# Patient Record
Sex: FEMALE | Race: WHITE | Employment: UNEMPLOYED | URBAN - METROPOLITAN AREA
[De-identification: names, ages, dates, MRNs, and addresses within clinical notes are randomized per-mention and may not be internally consistent; named-entity substitution may affect disease eponyms.]

---

## 2017-01-25 RX ORDER — LOSARTAN POTASSIUM AND HYDROCHLOROTHIAZIDE 25; 100 MG/1; MG/1
TABLET ORAL
Qty: 90 TAB | Refills: 1 | Status: SHIPPED | OUTPATIENT
Start: 2017-01-25 | End: 2017-08-03 | Stop reason: SDUPTHER

## 2017-01-25 RX ORDER — FLUOXETINE HYDROCHLORIDE 40 MG/1
CAPSULE ORAL
Qty: 90 CAP | Refills: 1 | Status: SHIPPED | OUTPATIENT
Start: 2017-01-25 | End: 2017-09-08 | Stop reason: SDUPTHER

## 2017-01-25 RX ORDER — AMLODIPINE BESYLATE 5 MG/1
TABLET ORAL
Qty: 90 TAB | Refills: 1 | Status: SHIPPED | OUTPATIENT
Start: 2017-01-25 | End: 2017-11-17 | Stop reason: SDUPTHER

## 2017-02-15 LAB — HBA1C MFR BLD HPLC: 7 %

## 2017-03-30 ENCOUNTER — OFFICE VISIT (OUTPATIENT)
Dept: INTERNAL MEDICINE CLINIC | Age: 57
End: 2017-03-30

## 2017-03-30 VITALS
WEIGHT: 221.6 LBS | HEIGHT: 62 IN | DIASTOLIC BLOOD PRESSURE: 78 MMHG | OXYGEN SATURATION: 98 % | TEMPERATURE: 98.2 F | SYSTOLIC BLOOD PRESSURE: 128 MMHG | HEART RATE: 86 BPM | RESPIRATION RATE: 16 BRPM | BODY MASS INDEX: 40.78 KG/M2

## 2017-03-30 DIAGNOSIS — E11.9 TYPE 2 DIABETES MELLITUS WITHOUT COMPLICATION, WITH LONG-TERM CURRENT USE OF INSULIN (HCC): Primary | ICD-10-CM

## 2017-03-30 DIAGNOSIS — E78.5 HYPERLIPIDEMIA LDL GOAL <100: ICD-10-CM

## 2017-03-30 DIAGNOSIS — Z13.9 SCREENING: ICD-10-CM

## 2017-03-30 DIAGNOSIS — F32.5 MAJOR DEPRESSIVE DISORDER WITH SINGLE EPISODE, IN FULL REMISSION (HCC): ICD-10-CM

## 2017-03-30 DIAGNOSIS — I10 ESSENTIAL HYPERTENSION: ICD-10-CM

## 2017-03-30 DIAGNOSIS — Z79.4 TYPE 2 DIABETES MELLITUS WITHOUT COMPLICATION, WITH LONG-TERM CURRENT USE OF INSULIN (HCC): Primary | ICD-10-CM

## 2017-03-30 NOTE — PROGRESS NOTES
HPI:  Comfort Males is a 60y.o. year old female who returns to clinic today for follow up:   AODM, HTN, hypercholesterolemia and depression     1. AODM:  Since last visit: she has lost 13 pounds since last visit on trulicity and is followed by Dr Clayton Or in endocrine .  Home glucose monitorin-110 with occasional 160 range. No low BS.  She reports medication compliance: taking medications.  She reports the following medication side effects: none.  following diabetic diet sometimes. 2. CV: Not exercising. She reports taking medications as instructed, no medication side effects noted,  BP readings outside of office have been in the 120's / 70's range. .  Diet and Lifestyle: generally follows a low fat low cholesterol diet, generally follows a low sodium diet. 3. Depression:  Treatment includes Prozac and no other therapies. Ongoing symptoms include none. She denies depressed mood, anhedonia and No suicidal ideation. .   She experiences the following side effects from the treatment: none.      Current Outpatient Prescriptions   Medication Sig Dispense Refill    CETIRIZINE HCL (ZYRTEC PO) Take  by mouth.  atorvastatin (LIPITOR) 40 mg tablet TAKE 1 TABLET NIGHTLY 90 Tab 0    amLODIPine (NORVASC) 5 mg tablet TAKE 1 TABLET DAILY 90 Tab 1    losartan-hydroCHLOROthiazide (HYZAAR) 100-25 mg per tablet TAKE 1 TABLET DAILY FOR HYPERTENSION 90 Tab 1    FLUoxetine (PROZAC) 40 mg capsule TAKE 1 CAPSULE DAILY 90 Cap 1    dulaglutide (TRULICITY) 0.19 PY/5.1 mL sub-q pen 0.75 mg by SubCUTAneous route every seven (7) days.  triamcinolone (NASACORT AQ) 55 mcg nasal inhaler 2 Sprays daily.  insulin CONCENTRATED regular (U-500 CONCENTRATED) 500 unit/mL soln by SubCUTAneous route. 18 units 3 times a day with meals       metFORMIN ER (GLUCOPHAGE XR) 500 mg tablet Take 2 tablets by mouth two (2) times a day.  120 tablet 0    Insulin Syringe-Needle U-100 1 mL 30 x 5/16\" syrg 1 Syringe by SubCUTAneous route daily as needed.  Insulin Syringe-Needle U-100 (BD INSULIN SYRINGE ULTRA-FINE) 1 mL 30 x 1/2\" syrg Use as directed. DX:250 100 Syringe 11    loratadine (CLARITIN) 10 mg tablet Take 10 mg by mouth daily.  fiber cap Take  by mouth.  ibuprofen (ADVIL) 200 mg tablet Take 3 Tabs by mouth every six (6) hours as needed for Pain. 60 Tab 0    aspirin 81 mg Tab Take  by mouth.  FLORIN PEN NEEDLE 32 x 5/32 \" ndle TEST BLOOD SUGAR IF GREATER THAN 180, CALCULATE CARB INTAKE FOR MEAL, AND GIVE 15-20 UNITS AT MEALS. 100 Each 3     Allergies   Allergen Reactions    Tenormin [Atenolol] Other (comments)     depression    Vicodin [Hydrocodone-Acetaminophen] Nausea and Vomiting     Social History   Substance Use Topics    Smoking status: Former Smoker     Packs/day: 2.00     Years: 20.00     Quit date: 6/1/1992    Smokeless tobacco: Never Used    Alcohol use No      Comment: rarely         Review of Systems   Constitutional: Negative for malaise/fatigue. Respiratory: Negative for shortness of breath. Cardiovascular: Negative for chest pain and leg swelling. Gastrointestinal: Negative for abdominal pain and heartburn. Neurological: Negative for dizziness and headaches. Physical Exam   Constitutional: She appears well-developed. No distress. /78  Pulse 86  Temp 98.2 °F (36.8 °C)  Resp 16  Ht 5' 2.01\" (1.575 m)  Wt 221 lb 9.6 oz (100.5 kg)  LMP 06/01/1998  SpO2 98%  BMI 40.52 kg/m2   Neck: Carotid bruit is not present. Cardiovascular: Normal rate, regular rhythm, normal heart sounds and intact distal pulses. No murmur heard. Pulmonary/Chest: Effort normal and breath sounds normal. She has no wheezes. Abdominal: Soft. Bowel sounds are normal. She exhibits no distension and no mass. There is no tenderness. Musculoskeletal: She exhibits no edema. Psychiatric: She has a normal mood and affect. Vitals reviewed. foot exam: Monofilament intact bilaterally. Pulses 2+ bilaterally.  No skin lesions or open wounds. Assessment & Plan:    ICD-10-CM ICD-9-CM    1. Type 2 diabetes mellitus without complication, with long-term current use of insulin ([address]  At goal last a1c at endocrine was 7.0. Continue current medication. E11.9 250.00 MICROALBUMIN, UR, RAND W/ MICROALBUMIN/CREA RATIO    Z79.4 V58.67    2. Essential hypertension  Well controlled, continue current medication. I10 401.9    3. Major depressive disorder with single episode, in full remission ([address]  Well controlled, continue current medication. F32.5 296.26    4. Hyperlipidemia LDL goal <100  Continue current plan and check lab work. W31.2 248.9 METABOLIC PANEL, COMPREHENSIVE      LIPID PANEL   5. Screening Z13.9 V82.9 CBC WITH AUTOMATED DIFF      Getting ready to travel to [facility] and needs to see travel clinic. Follow-up Disposition:  Return in about 6 months (around 9/30/2017) for follow up. Advised her to call back or return to office if symptoms worsen/change/persist.  Discussed expected course/resolution/complications of diagnosis in detail with patient. Medication risks/benefits/costs/interactions/alternatives discussed with patient. She was given an after visit summary which includes diagnoses, current medications, & vitals. She expressed understanding with the diagnosis and plan.

## 2017-03-31 LAB
ALBUMIN SERPL-MCNC: 4.2 G/DL (ref 3.5–5.5)
ALBUMIN/CREAT UR: <12 MG/G CREAT (ref 0–30)
ALBUMIN/GLOB SERPL: 2 {RATIO} (ref 1.2–2.2)
ALP SERPL-CCNC: 124 IU/L (ref 39–117)
ALT SERPL-CCNC: 26 IU/L (ref 0–32)
AST SERPL-CCNC: 22 IU/L (ref 0–40)
BASOPHILS # BLD AUTO: 0.1 X10E3/UL (ref 0–0.2)
BASOPHILS NFR BLD AUTO: 1 %
BILIRUB SERPL-MCNC: 0.4 MG/DL (ref 0–1.2)
BUN SERPL-MCNC: 9 MG/DL (ref 6–24)
BUN/CREAT SERPL: 15 (ref 9–23)
CALCIUM SERPL-MCNC: 9.7 MG/DL (ref 8.7–10.2)
CHLORIDE SERPL-SCNC: 95 MMOL/L (ref 96–106)
CHOLEST SERPL-MCNC: 149 MG/DL (ref 100–199)
CO2 SERPL-SCNC: 20 MMOL/L (ref 18–29)
CREAT SERPL-MCNC: 0.59 MG/DL (ref 0.57–1)
CREAT UR-MCNC: 25.1 MG/DL
EOSINOPHIL # BLD AUTO: 0.1 X10E3/UL (ref 0–0.4)
EOSINOPHIL NFR BLD AUTO: 2 %
ERYTHROCYTE [DISTWIDTH] IN BLOOD BY AUTOMATED COUNT: 14.6 % (ref 12.3–15.4)
GLOBULIN SER CALC-MCNC: 2.1 G/DL (ref 1.5–4.5)
GLUCOSE SERPL-MCNC: 197 MG/DL (ref 65–99)
HCT VFR BLD AUTO: 39.1 % (ref 34–46.6)
HDLC SERPL-MCNC: 47 MG/DL
HGB BLD-MCNC: 13.2 G/DL (ref 11.1–15.9)
IMM GRANULOCYTES # BLD: 0 X10E3/UL (ref 0–0.1)
IMM GRANULOCYTES NFR BLD: 0 %
LDLC SERPL CALC-MCNC: 80 MG/DL (ref 0–99)
LYMPHOCYTES # BLD AUTO: 2.5 X10E3/UL (ref 0.7–3.1)
LYMPHOCYTES NFR BLD AUTO: 35 %
MCH RBC QN AUTO: 28.4 PG (ref 26.6–33)
MCHC RBC AUTO-ENTMCNC: 33.8 G/DL (ref 31.5–35.7)
MCV RBC AUTO: 84 FL (ref 79–97)
MICROALBUMIN UR-MCNC: <3 UG/ML
MONOCYTES # BLD AUTO: 0.3 X10E3/UL (ref 0.1–0.9)
MONOCYTES NFR BLD AUTO: 5 %
NEUTROPHILS # BLD AUTO: 4 X10E3/UL (ref 1.4–7)
NEUTROPHILS NFR BLD AUTO: 57 %
PLATELET # BLD AUTO: 375 X10E3/UL (ref 150–379)
POTASSIUM SERPL-SCNC: 4.7 MMOL/L (ref 3.5–5.2)
PROT SERPL-MCNC: 6.3 G/DL (ref 6–8.5)
RBC # BLD AUTO: 4.64 X10E6/UL (ref 3.77–5.28)
SODIUM SERPL-SCNC: 139 MMOL/L (ref 134–144)
TRIGL SERPL-MCNC: 112 MG/DL (ref 0–149)
VLDLC SERPL CALC-MCNC: 22 MG/DL (ref 5–40)
WBC # BLD AUTO: 7.1 X10E3/UL (ref 3.4–10.8)

## 2017-04-14 ENCOUNTER — TELEPHONE (OUTPATIENT)
Dept: INTERNAL MEDICINE CLINIC | Age: 57
End: 2017-04-14

## 2017-04-14 RX ORDER — CHLOROQUINE PHOSPHATE 500 MG/1
TABLET, FILM COATED ORAL
Qty: 30 TAB | Refills: 0 | Status: CANCELLED | OUTPATIENT
Start: 2017-04-14

## 2017-04-14 NOTE — TELEPHONE ENCOUNTER
----- Message from Romana Case, LPN sent at 7/7/0668  3:58 PM EDT -----  Regarding: FW: Prescription Question  Contact: [address] on [facility] was called and they stated for this pt and for where she is going she would need the Typhoid Vaccine, Malaria Chloroquine, Yellow Fever Vaccine. Jennifer Tubbs  ----- Message -----     From: Rosa Palma     Sent: 4/3/2017   2:38 PM       To: [facility]  Subject: RE: Prescription Question                        [place]. [facility] at [facility], [address]. Phone is [phone]. Thanks!    ----- Message -----  From: Romana Case, LPN  Sent: 9/4/56, 42:05 AM  To: Rosa Palma  Subject: RE: Prescription Question    Manus Ran,  We received your message for the scripts for travel. Can you please let me know where it is you are traveling to and which [facility] you talked to. Thank you,    Jennifer Tubbs LPN    ----- Message -----     From: Rosa Palma     Sent: 3/30/2017  3:27 PM EDT       To: Chikis Waldron MD  Subject: Prescription Question    [facility] needs a scrip for the Typhoid (Vivotif), Malaria (Chloroquine) and Yellow Fever vaccine (for travel in Sept). Please fax that to them? Their fax is [phone].

## 2017-04-19 ENCOUNTER — TELEPHONE (OUTPATIENT)
Dept: INTERNAL MEDICINE CLINIC | Age: 57
End: 2017-04-19

## 2017-04-19 DIAGNOSIS — Z01.00 EYE EXAM, ROUTINE: Primary | ICD-10-CM

## 2017-04-19 NOTE — TELEPHONE ENCOUNTER
----- Message from Felix Stevenson sent at 4/19/2017 10:31 AM EDT -----  Regarding: Referral Request  Contact: [phone]  I need a referral to Dr. Killian Sidhu for June 2. He is with [facility] on [address]. This is for my annual eye exam.  Thanks much!

## 2017-04-20 ENCOUNTER — TELEPHONE (OUTPATIENT)
Dept: INTERNAL MEDICINE CLINIC | Age: 57
End: 2017-04-20

## 2017-04-20 RX ORDER — CHLOROQUINE PHOSPHATE 500 MG/1
TABLET, FILM COATED ORAL
Qty: 7 TAB | Refills: 0 | Status: SHIPPED | OUTPATIENT
Start: 2017-04-20 | End: 2017-10-03 | Stop reason: ALTCHOICE

## 2017-04-20 NOTE — TELEPHONE ENCOUNTER
----- Message from Magdalena Gregory LPN sent at 7/46/5410 10:31 AM EDT -----  Regarding: FW: Prescription Question  Contact: [phone]      ----- Message -----     From: Madan Greenberg     Sent: 4/19/2017  10:29 AM       To: Cassie Demarco [place]  Subject: RE: Prescription Question                        I thought I had responded. But in case not. .. Sept 18-26, 2017. Thanks!      ----- Message -----  From: Mayank Billingsley MD  Sent: 4/14/17, 5:27 PM  To: Madan Greenberg  Subject: RE: Prescription Question    What are the dates of your trip. The pharmacist did not have, and I can not prescribe the malaria medication without knowing the time you will be exposed. ----- Message -----     From: Madan Greenberg     Sent: 3/30/2017  3:27 PM EDT       To: Mayank Billingsley MD  Subject: Prescription Question    [facility] needs a scrip for the Typhoid (Vivotif), Malaria (Chloroquine) and Yellow Fever vaccine (for travel in Sept). Please fax that to them? Their fax is [phone].

## 2017-08-03 RX ORDER — LOSARTAN POTASSIUM AND HYDROCHLOROTHIAZIDE 25; 100 MG/1; MG/1
TABLET ORAL
Qty: 90 TAB | Refills: 1 | Status: SHIPPED | OUTPATIENT
Start: 2017-08-03 | End: 2018-04-27 | Stop reason: SDUPTHER

## 2017-08-18 ENCOUNTER — TELEPHONE (OUTPATIENT)
Dept: INTERNAL MEDICINE CLINIC | Age: 57
End: 2017-08-18

## 2017-09-06 ENCOUNTER — TELEPHONE (OUTPATIENT)
Dept: INTERNAL MEDICINE CLINIC | Age: 57
End: 2017-09-06

## 2017-09-06 RX ORDER — ACETAZOLAMIDE 125 MG/1
125 TABLET ORAL
Status: CANCELLED | OUTPATIENT
Start: 2017-09-06 | End: 2017-10-06

## 2017-09-06 RX ORDER — ACETAZOLAMIDE 500 MG/1
500 CAPSULE, EXTENDED RELEASE ORAL 2 TIMES DAILY
Qty: 22 CAP | Refills: 0 | Status: SHIPPED | OUTPATIENT
Start: 2017-09-06 | End: 2017-09-28

## 2017-09-06 NOTE — TELEPHONE ENCOUNTER
----- Message from Tono Live LPN sent at 8/2/6693  4:01 PM EDT -----  Regarding: FW: Prescription Question  Contact: [phone]      ----- Message -----     From: Renato Quiroga     Sent: 9/2/2017   9:56 AM       To: Malachi Demarco [place]  Subject: RE: Prescription Question                        Hoping you got this. Dates of trip are 9/18-9/26. Can you call in to [facility] on [address]?    ----- Message -----  From: Roman Alexis MD  Sent: 8/18/17, 12:44 PM  To: Renato Quiroga  Subject: RE: Prescription Question    She has a new problem. Continue to give me the dates of your trip, so I will know how much medication to send. Have a great weekend. Roman Alexis MD     ----- Message -----     From: Renato Quiroga     Sent: 8/17/2017  8:24 AM EDT       To: Roman Alexis MD  Subject: Prescription Question    I'm headed to [facility] 9/18 ( 9,000 feet). Due to previous issues with high altitude at 6,000 feet, I'd like to get a prescription for medication (Acetazolamide?). Can that be called in to [address] [place] [address]? Thanks!

## 2017-09-06 NOTE — TELEPHONE ENCOUNTER
----- Message from Audra Jules LPN sent at 6/7/9791  4:01 PM EDT -----  Regarding: FW: Prescription Question  Contact: [phone]      ----- Message -----     From: Perfecto Saldivar     Sent: 9/2/2017   9:56 AM       To: Tam Demarco [place]  Subject: RE: Prescription Question                        Hoping you got this. Dates of trip are 9/18-9/26. Can you call in to [facility] on [address]?    ----- Message -----  From: Liseth Mcdonald MD  Sent: 8/18/17, 12:44 PM  To: Perfecto Saldivar  Subject: RE: Prescription Question    She has a new problem. Continue to give me the dates of your trip, so I will know how much medication to send. Have a great weekend. Liseth Mcdonald MD     ----- Message -----     From: Perfecto Saldivar     Sent: 8/17/2017  8:24 AM EDT       To: Liseth Mcdonald MD  Subject: Prescription Question    I'm headed to [facility] 9/18 ( 9,000 feet). Due to previous issues with high altitude at 6,000 feet, I'd like to get a prescription for medication (Acetazolamide?). Can that be called in to [address] [place] [address]? Thanks!

## 2017-09-08 RX ORDER — FLUOXETINE HYDROCHLORIDE 40 MG/1
CAPSULE ORAL
Qty: 30 CAP | Refills: 0 | Status: SHIPPED | OUTPATIENT
Start: 2017-09-08 | End: 2017-10-03 | Stop reason: DRUGHIGH

## 2017-10-03 ENCOUNTER — OFFICE VISIT (OUTPATIENT)
Dept: INTERNAL MEDICINE CLINIC | Age: 57
End: 2017-10-03

## 2017-10-03 VITALS
SYSTOLIC BLOOD PRESSURE: 134 MMHG | DIASTOLIC BLOOD PRESSURE: 78 MMHG | OXYGEN SATURATION: 97 % | BODY MASS INDEX: 40.96 KG/M2 | RESPIRATION RATE: 16 BRPM | HEIGHT: 62 IN | HEART RATE: 86 BPM | WEIGHT: 222.6 LBS | TEMPERATURE: 97.6 F

## 2017-10-03 DIAGNOSIS — Z79.4 TYPE 2 DIABETES MELLITUS WITHOUT COMPLICATION, WITH LONG-TERM CURRENT USE OF INSULIN (HCC): Primary | ICD-10-CM

## 2017-10-03 DIAGNOSIS — E78.5 HYPERLIPIDEMIA LDL GOAL <100: ICD-10-CM

## 2017-10-03 DIAGNOSIS — I10 ESSENTIAL HYPERTENSION: ICD-10-CM

## 2017-10-03 DIAGNOSIS — F32.A MILD DEPRESSION: ICD-10-CM

## 2017-10-03 DIAGNOSIS — E11.9 TYPE 2 DIABETES MELLITUS WITHOUT COMPLICATION, WITH LONG-TERM CURRENT USE OF INSULIN (HCC): Primary | ICD-10-CM

## 2017-10-03 RX ORDER — FLUOXETINE 20 MG/1
60 TABLET ORAL DAILY
Qty: 90 TAB | Refills: 5 | Status: SHIPPED | OUTPATIENT
Start: 2017-10-03 | End: 2017-12-08 | Stop reason: SDUPTHER

## 2017-10-03 RX ORDER — CHOLECALCIFEROL (VITAMIN D3) 125 MCG
CAPSULE ORAL
COMMUNITY

## 2017-10-03 RX ORDER — BIOTIN 10000 MCG
TABLET,DISINTEGRATING ORAL
COMMUNITY
End: 2018-09-25

## 2017-10-03 NOTE — PROGRESS NOTES
HPI:  Saul Keith is a 60y.o. year old female who returns to clinic today for follow up: AODM, HTN, hypercholesterolemia and depression      1. AODM:  Since last visit: wt has been stable and has stopped trulicity due to expense and is followed by Dr Ernie Waters in endocrine .  Home glucose monitoring: BS not checking recently. She reports medication compliance: did not take insulin for a week while traveling and now back on insulin.   She reports the following medication side effects: none.  following diabetic diet sometimes. Not exercising 2. CV: Not exercising. She reports taking medications as instructed, no medication side effects noted,  BP readings outside of office have been in the 130 / 70-80's range. .  Diet and Lifestyle: generally follows a low fat low cholesterol diet, generally follows a low sodium diet. 3. Depression:  Treatment includes Prozac and no other therapies. Ongoing symptoms include mild sadness and usually feels that in the winter her depression when she does not have access to sun. She has a seasonal affective disorder lights she uses in winter. She would to increase medication. She denies depressed  anhedonia and No suicidal ideation. .   She experiences the following side effects from the treatment: none.      Current Outpatient Prescriptions   Medication Sig Dispense Refill    cholecalciferol, vitamin D3, (VITAMIN D3) 2,000 unit tab Take  by mouth.  biotin 10,000 mcg TbDi Take  by mouth.  FLUoxetine (PROZAC) 40 mg capsule TAKE 1 CAPSULE DAILY 30 Cap 0    losartan-hydroCHLOROthiazide (HYZAAR) 100-25 mg per tablet TAKE 1 TABLET DAILY FOR    HYPERTENSION 90 Tab 1    CETIRIZINE HCL (ZYRTEC PO) Take  by mouth.  atorvastatin (LIPITOR) 40 mg tablet TAKE 1 TABLET NIGHTLY 90 Tab 0    amLODIPine (NORVASC) 5 mg tablet TAKE 1 TABLET DAILY 90 Tab 1    triamcinolone (NASACORT AQ) 55 mcg nasal inhaler 2 Sprays daily.       insulin CONCENTRATED regular (U-500 CONCENTRATED) 500 unit/mL soln by SubCUTAneous route. 19 units 3 times a day with meals      metFORMIN ER (GLUCOPHAGE XR) 500 mg tablet Take 2 tablets by mouth two (2) times a day. 120 tablet 0    Insulin Syringe-Needle U-100 1 mL 30 x 5/16\" syrg 1 Syringe by SubCUTAneous route daily as needed.  FLORIN PEN NEEDLE 32 x 5/32 \" ndle TEST BLOOD SUGAR IF GREATER THAN 180, CALCULATE CARB INTAKE FOR MEAL, AND GIVE 15-20 UNITS AT MEALS. 100 Each 3    Insulin Syringe-Needle U-100 (BD INSULIN SYRINGE ULTRA-FINE) 1 mL 30 x 1/2\" syrg Use as directed. DX:250 100 Syringe 11    loratadine (CLARITIN) 10 mg tablet Take 10 mg by mouth daily.  ibuprofen (ADVIL) 200 mg tablet Take 3 Tabs by mouth every six (6) hours as needed for Pain. 60 Tab 0    aspirin 81 mg Tab Take  by mouth. Allergies   Allergen Reactions    Tenormin [Atenolol] Other (comments)     depression    Vicodin [Hydrocodone-Acetaminophen] Nausea and Vomiting     Social History   Substance Use Topics    Smoking status: Former Smoker     Packs/day: 2.00     Years: 20.00     Quit date: 6/1/1992    Smokeless tobacco: Never Used    Alcohol use No      Comment: rarely         Review of Systems   Constitutional: Negative for malaise/fatigue. Respiratory: Negative for shortness of breath. Cardiovascular: Negative for chest pain and leg swelling. Gastrointestinal: Negative for abdominal pain and heartburn. Neurological: Negative for dizziness and headaches. Physical Exam   Constitutional: She appears well-developed. No distress. /78  Pulse 86  Temp 97.6 °F (36.4 °C) (Oral)   Resp 16  Ht 5' 2\" (1.575 m)  Wt 222 lb 9.6 oz (101 kg)  LMP 06/01/1998  SpO2 97%  BMI 40.71 kg/m2   Neck: Carotid bruit is not present. Cardiovascular: Normal rate, regular rhythm, normal heart sounds and intact distal pulses. No murmur heard. Pulmonary/Chest: Effort normal and breath sounds normal. She has no wheezes. Abdominal: Soft.  Bowel sounds are normal. She exhibits no distension and no mass. There is no tenderness. Musculoskeletal: She exhibits no edema. Psychiatric: She has a normal mood and affect. Vitals reviewed. Assessment & Plan:    ICD-10-CM ICD-9-CM    1. Type 2 diabetes mellitus without complication, with long-term current use of insulin ([address]  Counseled importance of checking blood sugars. She will discuss with her endocrine she may be able to obtain for trulicity. Follow a strict diabetic diet continue medications at this time. E11.9 250.00 HEMOGLOBIN A1C WITH EAG    Z79.4 V58.67    2. Essential hypertension  Well-controlled continue current medication I10 401.9    3. Hyperlipidemia LDL goal <100  Continue atorvastatin and check lab work. Follow low-fat low-cholesterol diet. E78.5 272.4 LIPID PANEL      METABOLIC PANEL, COMPREHENSIVE   4. Mild depression (HCC)  Some mild increase in symptoms which she feels is really related to the time of year. And some increased stress in her life. At this point will increase her Prozac to 60 mg daily. She will contact us if any side effects. Or any acute worsening or suicidal thoughts. F32.0 311         Follow-up Disposition:  Return in about 3 months (around 1/3/2018) for follow up. Advised her to call back or return to office if symptoms worsen/change/persist.  Discussed expected course/resolution/complications of diagnosis in detail with patient. Medication risks/benefits/costs/interactions/alternatives discussed with patient. She was given an after visit summary which includes diagnoses, current medications, & vitals. She expressed understanding with the diagnosis and plan.

## 2017-10-04 LAB
ALBUMIN SERPL-MCNC: 3.9 G/DL (ref 3.5–5.5)
ALBUMIN/GLOB SERPL: 1.7 {RATIO} (ref 1.2–2.2)
ALP SERPL-CCNC: 129 IU/L (ref 39–117)
ALT SERPL-CCNC: 28 IU/L (ref 0–32)
AST SERPL-CCNC: 17 IU/L (ref 0–40)
BILIRUB SERPL-MCNC: 0.3 MG/DL (ref 0–1.2)
BUN SERPL-MCNC: 7 MG/DL (ref 6–24)
BUN/CREAT SERPL: 12 (ref 9–23)
CALCIUM SERPL-MCNC: 9.6 MG/DL (ref 8.7–10.2)
CHLORIDE SERPL-SCNC: 96 MMOL/L (ref 96–106)
CHOLEST SERPL-MCNC: 139 MG/DL (ref 100–199)
CO2 SERPL-SCNC: 25 MMOL/L (ref 18–29)
CREAT SERPL-MCNC: 0.59 MG/DL (ref 0.57–1)
EST. AVERAGE GLUCOSE BLD GHB EST-MCNC: 212 MG/DL
GLOBULIN SER CALC-MCNC: 2.3 G/DL (ref 1.5–4.5)
GLUCOSE SERPL-MCNC: 257 MG/DL (ref 65–99)
HBA1C MFR BLD: 9 % (ref 4.8–5.6)
HDLC SERPL-MCNC: 51 MG/DL
LDLC SERPL CALC-MCNC: 65 MG/DL (ref 0–99)
POTASSIUM SERPL-SCNC: 4.7 MMOL/L (ref 3.5–5.2)
PROT SERPL-MCNC: 6.2 G/DL (ref 6–8.5)
SODIUM SERPL-SCNC: 138 MMOL/L (ref 134–144)
TRIGL SERPL-MCNC: 116 MG/DL (ref 0–149)
VLDLC SERPL CALC-MCNC: 23 MG/DL (ref 5–40)

## 2017-11-01 RX ORDER — FLUOXETINE 20 MG/1
60 TABLET ORAL DAILY
Qty: 90 TAB | Refills: 5 | Status: CANCELLED | OUTPATIENT
Start: 2017-11-01

## 2017-11-17 RX ORDER — AMLODIPINE BESYLATE 5 MG/1
TABLET ORAL
Qty: 90 TAB | Refills: 1 | Status: SHIPPED | OUTPATIENT
Start: 2017-11-17 | End: 2018-04-27 | Stop reason: DRUGHIGH

## 2017-12-08 ENCOUNTER — OFFICE VISIT (OUTPATIENT)
Dept: INTERNAL MEDICINE CLINIC | Age: 57
End: 2017-12-08

## 2017-12-08 VITALS
TEMPERATURE: 98.5 F | HEIGHT: 62 IN | DIASTOLIC BLOOD PRESSURE: 78 MMHG | WEIGHT: 229.8 LBS | OXYGEN SATURATION: 98 % | BODY MASS INDEX: 42.29 KG/M2 | SYSTOLIC BLOOD PRESSURE: 130 MMHG | RESPIRATION RATE: 16 BRPM | HEART RATE: 85 BPM

## 2017-12-08 DIAGNOSIS — F32.5 MAJOR DEPRESSIVE DISORDER WITH SINGLE EPISODE, IN FULL REMISSION (HCC): Primary | ICD-10-CM

## 2017-12-08 RX ORDER — FLUOXETINE 20 MG/1
60 TABLET ORAL DAILY
Qty: 270 TAB | Refills: 1 | Status: SHIPPED | OUTPATIENT
Start: 2017-12-08 | End: 2018-04-27 | Stop reason: SDUPTHER

## 2017-12-08 NOTE — PROGRESS NOTES
HPI:  Mert Mayer is a 59y.o. year old female who returns to clinic today for follow up:   Depression:  Treatment includes Prozac and no other therapies. States feeling much better and attributes it to increasing Prozac. She also now has a friend who is her roommate which she feels is improved her mood. PHQ over the last two weeks 12/8/2017   Little interest or pleasure in doing things Not at all   Feeling down, depressed or hopeless Not at all   Total Score PHQ 2 0   Trouble falling or staying asleep, or sleeping too much -   Feeling tired or having little energy -   Poor appetite or overeating -   Feeling bad about yourself - or that you are a failure or have let yourself or your family down -   Trouble concentrating on things such as school, work, reading or watching TV -   Moving or speaking so slowly that other people could have noticed; or the opposite being so fidgety that others notice -   Thoughts of being better off dead, or hurting yourself in some way -   PHQ 9 Score -   How difficult have these problems made it for you to do your work, take care of your home and get along with others -      She experiences the following side effects from the treatment: none. Current Outpatient Prescriptions   Medication Sig Dispense Refill    amLODIPine (NORVASC) 5 mg tablet TAKE 1 TABLET DAILY 90 Tab 1    cholecalciferol, vitamin D3, (VITAMIN D3) 2,000 unit tab Take  by mouth.  biotin 10,000 mcg TbDi Take  by mouth.  FLUoxetine (PROZAC) 20 mg tablet Take 3 Tabs by mouth daily. 90 Tab 5    losartan-hydroCHLOROthiazide (HYZAAR) 100-25 mg per tablet TAKE 1 TABLET DAILY FOR    HYPERTENSION 90 Tab 1    CETIRIZINE HCL (ZYRTEC PO) Take  by mouth.  atorvastatin (LIPITOR) 40 mg tablet TAKE 1 TABLET NIGHTLY 90 Tab 0    triamcinolone (NASACORT AQ) 55 mcg nasal inhaler 2 Sprays daily.  insulin CONCENTRATED regular (U-500 CONCENTRATED) 500 unit/mL soln by SubCUTAneous route.  19 units 3 times a day with meals  Indications: humilin      metFORMIN ER (GLUCOPHAGE XR) 500 mg tablet Take 2 tablets by mouth two (2) times a day. 120 tablet 0    Insulin Syringe-Needle U-100 1 mL 30 x 5/16\" syrg 1 Syringe by SubCUTAneous route daily as needed.  FLORIN PEN NEEDLE 32 x 5/32 \" ndle TEST BLOOD SUGAR IF GREATER THAN 180, CALCULATE CARB INTAKE FOR MEAL, AND GIVE 15-20 UNITS AT MEALS. 100 Each 3    Insulin Syringe-Needle U-100 (BD INSULIN SYRINGE ULTRA-FINE) 1 mL 30 x 1/2\" syrg Use as directed. DX:250 100 Syringe 11    ibuprofen (ADVIL) 200 mg tablet Take 3 Tabs by mouth every six (6) hours as needed for Pain. 60 Tab 0    aspirin 81 mg Tab Take  by mouth.  loratadine (CLARITIN) 10 mg tablet Take 10 mg by mouth daily. Allergies   Allergen Reactions    Tenormin [Atenolol] Other (comments)     depression    Vicodin [Hydrocodone-Acetaminophen] Nausea and Vomiting     Social History   Substance Use Topics    Smoking status: Former Smoker     Packs/day: 2.00     Years: 20.00     Quit date: 6/1/1992    Smokeless tobacco: Never Used    Alcohol use No      Comment: rarely         Review of Systems   Respiratory: Negative for shortness of breath. Cardiovascular: Negative for chest pain. Gastrointestinal: Positive for constipation (stable). Negative for abdominal pain. Physical Exam   Constitutional: She appears well-developed. No distress. /78  Pulse 85  Temp 98.5 °F (36.9 °C) (Oral)   Resp 16  Ht 5' 2\" (1.575 m)  Wt 229 lb 12.8 oz (104.2 kg)  LMP 06/01/1998  SpO2 98%  BMI 42.03 kg/m2   Cardiovascular: Normal rate, regular rhythm, normal heart sounds and intact distal pulses. No murmur heard. Pulmonary/Chest: Effort normal and breath sounds normal. She has no wheezes. Abdominal: Soft. Bowel sounds are normal. She exhibits no distension and no mass. There is no tenderness. Musculoskeletal: She exhibits no edema. Psychiatric: She has a normal mood and affect.    Vitals reviewed. Assessment & Plan:    ICD-10-CM ICD-9-CM    1. Major depressive disorder with single episode, in full remission ([facility] F32.5 296.26     Well controlled, continue current medication. Follow-up Disposition:  Return in about 4 months (around 4/8/2018). For her routine follow-up  Advised her to call back or return to office if symptoms worsen/change/persist.  Discussed expected course/resolution/complications of diagnosis in detail with patient. Medication risks/benefits/costs/interactions/alternatives discussed with patient. She was given an after visit summary which includes diagnoses, current medications, & vitals. She expressed understanding with the diagnosis and plan.

## 2017-12-08 NOTE — PROGRESS NOTES
Chief Complaint   Patient presents with    Medication Evaluation     1. Have you been to the ER, urgent care clinic since your last visit? Hospitalized since your last visit? No    2. Have you seen or consulted any other health care providers outside of the [address] [place] [address] since your last visit? Include any pap smears or colon screening.  No

## 2018-03-28 LAB
HBA1C MFR BLD HPLC: 10.5 %
LDL-C, EXTERNAL: NORMAL

## 2018-04-06 ENCOUNTER — OFFICE VISIT (OUTPATIENT)
Dept: INTERNAL MEDICINE CLINIC | Age: 58
End: 2018-04-06

## 2018-04-06 ENCOUNTER — HOSPITAL ENCOUNTER (OUTPATIENT)
Dept: GENERAL RADIOLOGY | Age: 58
Discharge: HOME | End: 2018-04-06
Payer: COMMERCIAL

## 2018-04-06 VITALS
OXYGEN SATURATION: 97 % | TEMPERATURE: 98.2 F | DIASTOLIC BLOOD PRESSURE: 70 MMHG | BODY MASS INDEX: 41.92 KG/M2 | RESPIRATION RATE: 16 BRPM | SYSTOLIC BLOOD PRESSURE: 142 MMHG | HEART RATE: 102 BPM | HEIGHT: 62 IN | WEIGHT: 227.8 LBS

## 2018-04-06 DIAGNOSIS — M54.12 CERVICAL RADICULOPATHY: ICD-10-CM

## 2018-04-06 DIAGNOSIS — M54.12 CERVICAL RADICULOPATHY: Primary | ICD-10-CM

## 2018-04-06 PROCEDURE — 72052 X-RAY EXAM NECK SPINE 6/>VWS: CPT

## 2018-04-06 RX ORDER — METHYLPREDNISOLONE 4 MG/1
TABLET ORAL
Qty: 1 DOSE PACK | Refills: 0 | Status: SHIPPED | OUTPATIENT
Start: 2018-04-06 | End: 2018-04-27 | Stop reason: ALTCHOICE

## 2018-04-06 RX ORDER — TRAMADOL HYDROCHLORIDE 50 MG/1
50 TABLET ORAL
Qty: 30 TAB | Refills: 0 | Status: SHIPPED | OUTPATIENT
Start: 2018-04-06 | End: 2018-04-18 | Stop reason: ALTCHOICE

## 2018-04-06 NOTE — PROGRESS NOTES
HPI:  Ammy Leung is a 59y.o. year old female who returns to clinic today for follow up:   2 days ago Left upper back pain with pain and burning going down her left arm. No numbness, tingling or weakness. advill 800 mg gives very little relief from pain. Not able to sleep last night. Pain is 7 out of 10 and not improving. Denies any specific injury but has been working more on her computer with a mouse. No heavy lifting or bending. Has pain in neck daily for years. Current Outpatient Prescriptions   Medication Sig Dispense Refill    FLUoxetine (PROZAC) 20 mg tablet Take 3 Tabs by mouth daily. 270 Tab 1    amLODIPine (NORVASC) 5 mg tablet TAKE 1 TABLET DAILY 90 Tab 1    cholecalciferol, vitamin D3, (VITAMIN D3) 2,000 unit tab Take  by mouth.  losartan-hydroCHLOROthiazide (HYZAAR) 100-25 mg per tablet TAKE 1 TABLET DAILY FOR    HYPERTENSION 90 Tab 1    CETIRIZINE HCL (ZYRTEC PO) Take  by mouth.  atorvastatin (LIPITOR) 40 mg tablet TAKE 1 TABLET NIGHTLY 90 Tab 0    triamcinolone (NASACORT AQ) 55 mcg nasal inhaler 2 Sprays daily.  insulin CONCENTRATED regular (U-500 CONCENTRATED) 500 unit/mL soln by SubCUTAneous route. 19 units 3 times a day with meals. Patient states 20-25 units depending on her blood sugars. Indications: humilin      metFORMIN ER (GLUCOPHAGE XR) 500 mg tablet Take 2 tablets by mouth two (2) times a day. 120 tablet 0    Insulin Syringe-Needle U-100 1 mL 30 x 5/16\" syrg 1 Syringe by SubCUTAneous route daily as needed.  FLORIN PEN NEEDLE 32 x 5/32 \" ndle TEST BLOOD SUGAR IF GREATER THAN 180, CALCULATE CARB INTAKE FOR MEAL, AND GIVE 15-20 UNITS AT MEALS. 100 Each 3    Insulin Syringe-Needle U-100 (BD INSULIN SYRINGE ULTRA-FINE) 1 mL 30 x 1/2\" syrg Use as directed. DX:250 100 Syringe 11    loratadine (CLARITIN) 10 mg tablet Take 10 mg by mouth daily.  ibuprofen (ADVIL) 200 mg tablet Take 3 Tabs by mouth every six (6) hours as needed for Pain.  60 Tab 0    aspirin 81 mg Tab Take  by mouth.  biotin 10,000 mcg TbDi Take  by mouth. Allergies   Allergen Reactions    Tenormin [Atenolol] Other (comments)     depression    Vicodin [Hydrocodone-Acetaminophen] Nausea and Vomiting     Social History   Substance Use Topics    Smoking status: Former Smoker     Packs/day: 2.00     Years: 20.00     Quit date: 6/1/1992    Smokeless tobacco: Never Used    Alcohol use No      Comment: rarely         Review of Systems   Respiratory: Negative for shortness of breath. Cardiovascular: Negative for chest pain. Physical Exam   Constitutional: She appears well-developed. No distress. /70  Pulse (!) 102  Temp 98.2 °F (36.8 °C) (Oral)   Resp 16  Ht 5' 2\" (1.575 m)  Wt 227 lb 12.8 oz (103.3 kg)  LMP 06/01/1998  SpO2 97%  BMI 41.67 kg/m2   Cardiovascular: Intact distal pulses. Abdominal: Soft. Bowel sounds are normal. There is no tenderness. Musculoskeletal: She exhibits no edema. Cervical back: She exhibits tenderness, pain and spasm. She exhibits normal range of motion and no bony tenderness. .    Neurological: She has normal strength and normal reflexes. No sensory deficit. Vitals reviewed. Assessment & Plan:    ICD-10-CM ICD-9-CM    1. Cervical radiculopathy M54.12 723.4 traMADol (ULTRAM) 50 mg tablet      CANCELED: XR SPINE CERV 4 OR 5 V   Counseled and given handout with exercises. Medrol Dosepak. Avoid NSAIDs while on steroids. Tramadol as needed. Counseled regarding potential medication interactions and side effects. Orders Placed This Encounter    methylPREDNISolone (MEDROL DOSEPACK) 4 mg tablet    traMADol (ULTRAM) 50 mg tablet        Follow-up Disposition:  Return if symptoms worsen or fail to improve. If any problems or not improving she will follow-up.   Advised her to call back or return to office if symptoms worsen/change/persist.  Discussed expected course/resolution/complications of diagnosis in detail with patient. Medication risks/benefits/costs/interactions/alternatives discussed with patient. She was given an after visit summary which includes diagnoses, current medications, & vitals. She expressed understanding with the diagnosis and plan.

## 2018-04-06 NOTE — PATIENT INSTRUCTIONS
Pinched Nerve in the Neck: Care Instructions  Your Care Instructions  A pinched nerve in the neck happens when a vertebra or disc in the upper part of your spine is damaged. This damage can happen because of an injury. Or it can just happen with age. The changes caused by the damage may put pressure on a nearby nerve root, pinching it. This causes symptoms such as sharp pain in your neck, shoulder, arm, hand, or back. You may also have tingling or numbness. Sometimes it makes your arm weaker. The symptoms are usually worse when you turn your head or strain your neck. For many people, the symptoms get better over time and finally go away. Early treatment usually includes medicines for pain and swelling. Sometimes physical therapy and special exercises may help. Follow-up care is a key part of your treatment and safety. Be sure to make and go to all appointments, and call your doctor if you are having problems. It's also a good idea to know your test results and keep a list of the medicines you take. How can you care for yourself at home? · Be safe with medicines. Read and follow all instructions on the label. ¨ If the doctor gave you a prescription medicine for pain, take it as prescribed. ¨ If you are not taking a prescription pain medicine, ask your doctor if you can take an over-the-counter medicine. · Try using a heating pad on a low or medium setting for 15 to 20 minutes every 2 or 3 hours. Try a warm shower in place of one session with the heating pad. You can also buy single-use heat wraps that last up to 8 hours. · You can also try an ice pack for 10 to 15 minutes every 2 to 3 hours. There isn't strong evidence that either heat or ice will help. But you can try them to see if they help you. · Don't spend too long in one position. Take short breaks to move around and change positions. · Wear a seat belt and shoulder harness when you are in a car.   · Sleep with a pillow under your head and neck that keeps your neck straight. · If you were given a neck brace (cervical collar) to limit neck motion, wear it as instructed for as many days as your doctor tells you to. Do not wear it longer than you were told to. Wearing a brace for too long can lead to neck stiffness and can weaken the neck muscles. · Follow your doctor's instructions for gentle neck-stretching exercises. · Do not smoke. Smoking can slow healing of your discs. If you need help quitting, talk to your doctor about stop-smoking programs and medicines. These can increase your chances of quitting for good. · Avoid strenuous work or exercise until your doctor says it is okay. When should you call for help? Call 911 anytime you think you may need emergency care. For example, call if:  ? · You are unable to move an arm or a leg at all. ?Call your doctor now or seek immediate medical care if:  ? · You have new or worse symptoms in your arms, legs, chest, belly, or buttocks. Symptoms may include:  ¨ Numbness or tingling. ¨ Weakness. ¨ Pain. ? · You lose bladder or bowel control. ? Watch closely for changes in your health, and be sure to contact your doctor if:  ? · You are not getting better as expected. Where can you learn more? Go to http://leyla-tasia.info/. Enter P201 in the search box to learn more about \"Pinched Nerve in the Neck: Care Instructions. \"  Current as of: March 21, 2017  Content Version: 11.4  © 4841-7824 [employer] [place]. Care instructions adapted under license by [employer] (which disclaims liability or warranty for this information). If you have questions about a medical condition or this instruction, always ask your healthcare professional. [place] [address] any warranty or liability for your use of this information.

## 2018-04-06 NOTE — PROGRESS NOTES
Chief Complaint   Patient presents with    Arm Pain     left    Shoulder Pain     left    Neck Pain     left     Patient states she is here for left arm, shoulder and neck pain. Patient states pain radiates up the arm and ever around left side. Patient states pain for a couple of days.

## 2018-04-06 NOTE — MR AVS SNAPSHOT
[address] [facility] 
[phone] Patient: Lexi Gray MRN:  [id number] Visit Information Date & Time Provider Department Dept. Phone Encounter #  
 2018 11:45 AM Enrico Opitz, [address] [facility] Internal Medicine [phone] [id number] Follow-up Instructions Return if symptoms worsen or fail to improve. Your Appointments 2018  8:00 AM  
ROUTINE CARE with Enrico Opitz, MD  
[facility] Internal Medicine [facility]) Appt Note: f/u  
 [address] [facility] [address]  
[address] [facility] [place] [address] Upcoming Health Maintenance Date Due  
 FOOT EXAM Q1 3/30/2018 MICROALBUMIN Q1 3/30/2018 HEMOGLOBIN A1C Q6M 2018 EYE EXAM RETINAL OR DILATED Q1 2018 LIPID PANEL Q1 10/2/2018 GLAUCOMA SCREENING Q2Y 2019 COLONOSCOPY 3/1/2025 DTaP/Tdap/Td series (3 - Td) 3/30/2027 Allergies as of 2018  Review Complete On: 2018 By: Murali Khalil LPN Severity Noted Reaction Type Reactions Tenormin [Atenolol]  2009    Other (comments) depression Vicodin [Hydrocodone-acetaminophen]  2009    Nausea and Vomiting Current Immunizations  Reviewed on 2016 Name Date Influenza Vaccine 10/2/2015, 2014, 2013 Influenza Vaccine (Quad) PF 2016 Influenza Vaccine Whole 2012 Pneumococcal Polysaccharide (PPSV-23) 2013 TDAP Vaccine 2008 Tdap 3/30/2017 Yellow Fever Vaccine 3/30/2017 ZZZ-RETIRED (DO NOT USE) Pneumococcal Vaccine (Unspecified Type) 2002 Zoster Vaccine, Live 2014 Not reviewed this visit You Were Diagnosed With   
  
 Codes Comments Cervical radiculopathy    -  Primary ICD-10-CM: M54.12 
ICD-9-CM: 723.4 Vitals BP Pulse Temp Resp Height(growth percentile) Weight(growth percentile) 170/78 (BP 1 Location: Left arm, BP Patient Position: Sitting) (!) 102 98.2 °F (36.8 °C) (Oral) 16 5' 2\" (1.575 m) 227 lb 12.8 oz (103.3 kg) LMP SpO2 BMI OB Status Smoking Status 06/01/1998 97% 41.67 kg/m2 Hysterectomy Former Smoker BMI and BSA Data Body Mass Index Body Surface Area  
 41.67 kg/m 2 2.13 m 2 Preferred Pharmacy Pharmacy Name Phone [id number]- Sallyann Favre, [address] [phone] Your Updated Medication List  
  
   
This list is accurate as of 4/6/18 12:25 PM.  Always use your most recent med list. ADVIL 200 mg tablet Generic drug:  ibuprofen Take 3 Tabs by mouth every six (6) hours as needed for Pain. amLODIPine 5 mg tablet Commonly known as:  Mariah Ape TAKE 1 TABLET DAILY  
  
 aspirin 81 mg tablet Take  by mouth. atorvastatin 40 mg tablet Commonly known as:  LIPITOR  
TAKE 1 TABLET NIGHTLY  
  
 biotin 10,000 mcg Tbdi Take  by mouth. FLUoxetine 20 mg tablet Commonly known as:  PROzac Take 3 Tabs by mouth daily. insulin CONCENTRATED regular 500 unit/mL Soln Commonly known as:  U-500 CONCENTRATED  
by SubCUTAneous route. 19 units 3 times a day with meals. Patient states 20-25 units depending on her blood sugars. Indications: humilin * Insulin Syringe-Needle U-100 1 mL 30 gauge x 1/2\" Syrg Commonly known as:  BD INSULIN SYRINGE ULTRA-FINE Use as directed. DX:250 * Insulin Syringe-Needle U-100 1 mL 30 gauge x 5/16 Syrg 1 Syringe by SubCUTAneous route daily as needed. loratadine 10 mg tablet Commonly known as:  Radu Brew Take 10 mg by mouth daily. losartan-hydroCHLOROthiazide 100-25 mg per tablet Commonly known as:  HYZAAR  
TAKE 1 TABLET DAILY FOR    HYPERTENSION  
  
 metFORMIN  mg tablet Commonly known as:  GLUCOPHAGE XR  
 Take 2 tablets by mouth two (2) times a day. methylPREDNISolone 4 mg tablet Commonly known as:  MEDROL DOSEPACK  
6 tablets day 1 then 5 tablets day 2 then 4 tablets day 3 then 3 tablets day 4 then 2 tablets day 5 then 1 tablet day 6 then stop. Patrica Pen Needle 32 gauge x \" Ndle Generic drug:  Insulin Needles (Disposable) TEST BLOOD SUGAR IF GREATER THAN 180, CALCULATE CARB INTAKE FOR MEAL, AND GIVE 15-20 UNITS AT MEALS. NASACORT AQ 55 mcg nasal inhaler Generic drug:  triamcinolone 2 Sprays daily. traMADol 50 mg tablet Commonly known as:  ULTRAM  
Take 1 Tab by mouth every eight (8) hours as needed for Pain. Max Daily Amount: 150 mg. VITAMIN D3 2,000 unit Tab Generic drug:  cholecalciferol (vitamin D3) Take  by mouth. ZYRTEC PO Take  by mouth. * Notice: This list has 2 medication(s) that are the same as other medications prescribed for you. Read the directions carefully, and ask your doctor or other care provider to review them with you. Prescriptions Printed Refills  
 traMADol (ULTRAM) 50 mg tablet 0 Sig: Take 1 Tab by mouth every eight (8) hours as needed for Pain. Max Daily Amount: 150 mg.  
 Class: Print Route: Oral  
  
Prescriptions Sent to Pharmacy Refills  
 methylPREDNISolone (MEDROL DOSEPACK) 4 mg tablet 0 Si tablets day 1 then 5 tablets day 2 then 4 tablets day 3 then 3 tablets day 4 then 2 tablets day 5 then 1 tablet day 6 then stop. Class: Normal  
 Pharmacy: [facility] #: [phone] Follow-up Instructions Return if symptoms worsen or fail to improve. To-Do List   
 2018 Imaging:  XR SPINE CERV 4 OR 5 V Patient Instructions Pinched Nerve in the Neck: Care Instructions Your Care Instructions A pinched nerve in the neck happens when a vertebra or disc in the upper part of your spine is damaged. This damage can happen because of an injury. Or it can just happen with age. The changes caused by the damage may put pressure on a nearby nerve root, pinching it. This causes symptoms such as sharp pain in your neck, shoulder, arm, hand, or back. You may also have tingling or numbness. Sometimes it makes your arm weaker. The symptoms are usually worse when you turn your head or strain your neck. For many people, the symptoms get better over time and finally go away. Early treatment usually includes medicines for pain and swelling. Sometimes physical therapy and special exercises may help. Follow-up care is a key part of your treatment and safety. Be sure to make and go to all appointments, and call your doctor if you are having problems. It's also a good idea to know your test results and keep a list of the medicines you take. How can you care for yourself at home? · Be safe with medicines. Read and follow all instructions on the label. ¨ If the doctor gave you a prescription medicine for pain, take it as prescribed. ¨ If you are not taking a prescription pain medicine, ask your doctor if you can take an over-the-counter medicine. · Try using a heating pad on a low or medium setting for 15 to 20 minutes every 2 or 3 hours. Try a warm shower in place of one session with the heating pad. You can also buy single-use heat wraps that last up to 8 hours. · You can also try an ice pack for 10 to 15 minutes every 2 to 3 hours. There isn't strong evidence that either heat or ice will help. But you can try them to see if they help you. · Don't spend too long in one position. Take short breaks to move around and change positions. · Wear a seat belt and shoulder harness when you are in a car. · Sleep with a pillow under your head and neck that keeps your neck straight.  
· If you were given a neck brace (cervical collar) to limit neck motion, wear it as instructed for as many days as your doctor tells you to. Do not wear it longer than you were told to. Wearing a brace for too long can lead to neck stiffness and can weaken the neck muscles. · Follow your doctor's instructions for gentle neck-stretching exercises. · Do not smoke. Smoking can slow healing of your discs. If you need help quitting, talk to your doctor about stop-smoking programs and medicines. These can increase your chances of quitting for good. · Avoid strenuous work or exercise until your doctor says it is okay. When should you call for help? Call 911 anytime you think you may need emergency care. For example, call if: 
? · You are unable to move an arm or a leg at all. ?Call your doctor now or seek immediate medical care if: 
? · You have new or worse symptoms in your arms, legs, chest, belly, or buttocks. Symptoms may include: ¨ Numbness or tingling. ¨ Weakness. ¨ Pain. ? · You lose bladder or bowel control. ? Watch closely for changes in your health, and be sure to contact your doctor if: 
? · You are not getting better as expected. Where can you learn more? Go to http://leyla-tasia.info/. Enter V989 in the search box to learn more about \"Pinched Nerve in the Neck: Care Instructions. \" Current as of: March 21, 2017 Content Version: 11.4 © 6604-2953 [employer]. Care instructions adapted under license by [email or web address] (which disclaims liability or warranty for this information). If you have questions about a medical condition or this instruction, always ask your healthcare professional. [place] [address] any warranty or liability for your use of this information. Introducing [facility]! Dear Sherren Eric: Thank you for requesting a [employer] account. Our records indicate that you already have an active [employer] account. You can access your account anytime at [email or web address] Did you know that you can access your hospital and ER discharge instructions at any time in [employer]? You can also review all of your test results from your hospital stay or ER visit. Additional Information If you have questions, please visit the Frequently Asked Questions section of the [employer] website at [email or web address] Remember, [employer]Score is NOT to be used for urgent needs. For medical emergencies, dial 911. Now available from your iPhone and Android! Please provide this summary of care documentation to your next provider. Your primary care clinician is listed as Jerica Mcallister. If you have any questions after today's visit, please call [phone].

## 2018-04-17 ENCOUNTER — OFFICE VISIT (OUTPATIENT)
Dept: INTERNAL MEDICINE CLINIC | Age: 58
End: 2018-04-17

## 2018-04-17 VITALS
RESPIRATION RATE: 16 BRPM | OXYGEN SATURATION: 97 % | WEIGHT: 227 LBS | BODY MASS INDEX: 41.77 KG/M2 | SYSTOLIC BLOOD PRESSURE: 145 MMHG | HEIGHT: 62 IN | TEMPERATURE: 98.3 F | DIASTOLIC BLOOD PRESSURE: 82 MMHG | HEART RATE: 106 BPM

## 2018-04-17 DIAGNOSIS — Z79.4 TYPE 2 DIABETES MELLITUS WITHOUT COMPLICATION, WITH LONG-TERM CURRENT USE OF INSULIN (HCC): ICD-10-CM

## 2018-04-17 DIAGNOSIS — M54.12 CERVICAL RADICULOPATHY: Primary | ICD-10-CM

## 2018-04-17 DIAGNOSIS — E11.9 TYPE 2 DIABETES MELLITUS WITHOUT COMPLICATION, WITH LONG-TERM CURRENT USE OF INSULIN (HCC): ICD-10-CM

## 2018-04-17 RX ORDER — GABAPENTIN 100 MG/1
CAPSULE ORAL
Qty: 120 CAP | Refills: 1 | Status: SHIPPED | OUTPATIENT
Start: 2018-04-17 | End: 2018-04-27 | Stop reason: SDUPTHER

## 2018-04-17 NOTE — PROGRESS NOTES
HPI:  Mitzy Gutierrez is a 59y.o. year old female who returns to clinic today for follow up: she continues to have left sided posterior neck pain and radiates down arm and back. No sensory or motor loss. She has about 20% improvement after Medrol Dosepak. Unfortunately her blood sugars were higher on steroid. She describes pain as aching  5/10 pain level and has been taking tramadol 2 tablets every 8 hours but does not feel it is very effective. Current Outpatient Prescriptions   Medication Sig Dispense Refill    traMADol (ULTRAM) 50 mg tablet Take 1 Tab by mouth every eight (8) hours as needed for Pain. Max Daily Amount: 150 mg. 30 Tab 0    FLUoxetine (PROZAC) 20 mg tablet Take 3 Tabs by mouth daily. 270 Tab 1    amLODIPine (NORVASC) 5 mg tablet TAKE 1 TABLET DAILY 90 Tab 1    cholecalciferol, vitamin D3, (VITAMIN D3) 2,000 unit tab Take  by mouth.  losartan-hydroCHLOROthiazide (HYZAAR) 100-25 mg per tablet TAKE 1 TABLET DAILY FOR    HYPERTENSION 90 Tab 1    CETIRIZINE HCL (ZYRTEC PO) Take  by mouth.  atorvastatin (LIPITOR) 40 mg tablet TAKE 1 TABLET NIGHTLY 90 Tab 0    triamcinolone (NASACORT AQ) 55 mcg nasal inhaler 2 Sprays daily.  insulin CONCENTRATED regular (U-500 CONCENTRATED) 500 unit/mL soln by SubCUTAneous route. 19 units 3 times a day with meals. Patient states 20-25 units depending on her blood sugars. Indications: humilin      metFORMIN ER (GLUCOPHAGE XR) 500 mg tablet Take 2 tablets by mouth two (2) times a day. 120 tablet 0    Insulin Syringe-Needle U-100 1 mL 30 x 5/16\" syrg 1 Syringe by SubCUTAneous route daily as needed.  FLORIN PEN NEEDLE 32 x 5/32 \" ndle TEST BLOOD SUGAR IF GREATER THAN 180, CALCULATE CARB INTAKE FOR MEAL, AND GIVE 15-20 UNITS AT MEALS. 100 Each 3    Insulin Syringe-Needle U-100 (BD INSULIN SYRINGE ULTRA-FINE) 1 mL 30 x 1/2\" syrg Use as directed.  DX:250 100 Syringe 11    ibuprofen (ADVIL) 200 mg tablet Take 3 Tabs by mouth every six (6) hours as needed for Pain. 60 Tab 0    aspirin 81 mg Tab Take  by mouth.  methylPREDNISolone (MEDROL DOSEPACK) 4 mg tablet 6 tablets day 1 then 5 tablets day 2 then 4 tablets day 3 then 3 tablets day 4 then 2 tablets day 5 then 1 tablet day 6 then stop. 1 Dose Pack 0    biotin 10,000 mcg TbDi Take  by mouth.  loratadine (CLARITIN) 10 mg tablet Take 10 mg by mouth daily. Allergies   Allergen Reactions    Tenormin [Atenolol] Other (comments)     depression    Vicodin [Hydrocodone-Acetaminophen] Nausea and Vomiting     Social History   Substance Use Topics    Smoking status: Former Smoker     Packs/day: 2.00     Years: 20.00     Quit date: 6/1/1992    Smokeless tobacco: Never Used    Alcohol use No      Comment: rarely         Review of Systems   Constitutional: Negative for chills and fever. Respiratory: Negative for shortness of breath. Cardiovascular: Negative for chest pain. Gastrointestinal: Negative for abdominal pain. Neurological: Negative for sensory change and focal weakness. Physical Exam   Constitutional: She appears well-developed. No distress. /82 (BP 1 Location: Right arm, BP Patient Position: Sitting)  Pulse (!) 106  Temp 98.3 °F (36.8 °C) (Oral)   Resp 16  Ht 5' 2\" (1.575 m)  Wt 227 lb (103 kg)  LMP 06/01/1998  SpO2 97%  BMI 41.52 kg/m2   Cardiovascular: Normal rate, regular rhythm and intact distal pulses. Pulmonary/Chest: Effort normal and breath sounds normal.   Abdominal: Soft. Bowel sounds are normal. There is no tenderness. Musculoskeletal: She exhibits no edema. Cervical back: She exhibits tenderness and spasm. She exhibits normal range of motion and no bony tenderness. Neurological: She has normal strength. No sensory deficit. Vitals reviewed. Assessment & Plan:    ICD-10-CM ICD-9-CM    1. Cervical radiculopathy only mildly improved after steroid pack. Stop tramadol and start gabapentin. Reviewed potential side effects.   Refer to orthopedic surgery for further evaluation. Reviewed cervical spine x-ray with patient today. M54.12 723.4 REFERRAL TO ORTHOPEDIC SURGERY   2. Type 2 diabetes mellitus without complication, with long-term current use of insulin ([facility]) E11.9 250.00 MICROALBUMIN, UR, RAND W/ MICROALB/CREAT RATIO    Z79.4 V58.67       Orders Placed This Encounter    MICROALBUMIN, UR, RAND W/ MICROALB/CREAT RATIO    REFERRAL TO ORTHOPEDIC SURGERY    gabapentin (NEURONTIN) 100 mg capsule      Counseled regarding elevated BMI and current weight goals. Reviewed weight loss strategies including dietary changes and exercise. Follow-up Disposition:  Return in about 4 weeks (around 5/15/2018), or if symptoms worsen or fail to improve. Advised her to call back or return to office if symptoms worsen/change/persist.  Discussed expected course/resolution/complications of diagnosis in detail with patient. Medication risks/benefits/costs/interactions/alternatives discussed with patient. She was given an after visit summary which includes diagnoses, current medications, & vitals. She expressed understanding with the diagnosis and plan.

## 2018-04-17 NOTE — PROGRESS NOTES
Chief Complaint   Patient presents with    Neck Pain    Arm Pain     left     Patient states she is here for follow up on neck and left arm pain. Patient states pain is better but wants pain to go away.

## 2018-04-18 LAB
ALBUMIN/CREAT UR: 40.6 MG/G CREAT (ref 0–30)
CREAT UR-MCNC: 97.2 MG/DL
MICROALBUMIN UR-MCNC: 39.5 UG/ML

## 2018-04-27 ENCOUNTER — OFFICE VISIT (OUTPATIENT)
Dept: INTERNAL MEDICINE CLINIC | Age: 58
End: 2018-04-27

## 2018-04-27 VITALS
SYSTOLIC BLOOD PRESSURE: 160 MMHG | TEMPERATURE: 98.2 F | BODY MASS INDEX: 42.8 KG/M2 | RESPIRATION RATE: 16 BRPM | HEIGHT: 62 IN | HEART RATE: 99 BPM | DIASTOLIC BLOOD PRESSURE: 90 MMHG | WEIGHT: 232.6 LBS | OXYGEN SATURATION: 97 %

## 2018-04-27 DIAGNOSIS — M54.12 CERVICAL RADICULOPATHY: Primary | ICD-10-CM

## 2018-04-27 DIAGNOSIS — Z79.4 TYPE 2 DIABETES MELLITUS WITHOUT COMPLICATION, WITH LONG-TERM CURRENT USE OF INSULIN (HCC): ICD-10-CM

## 2018-04-27 DIAGNOSIS — L21.9 SEBORRHEA: ICD-10-CM

## 2018-04-27 DIAGNOSIS — E11.9 TYPE 2 DIABETES MELLITUS WITHOUT COMPLICATION, WITH LONG-TERM CURRENT USE OF INSULIN (HCC): ICD-10-CM

## 2018-04-27 DIAGNOSIS — L30.9 ECZEMA, UNSPECIFIED TYPE: ICD-10-CM

## 2018-04-27 DIAGNOSIS — E78.5 HYPERLIPIDEMIA LDL GOAL <100: ICD-10-CM

## 2018-04-27 DIAGNOSIS — F32.5 MAJOR DEPRESSIVE DISORDER WITH SINGLE EPISODE, IN FULL REMISSION (HCC): ICD-10-CM

## 2018-04-27 DIAGNOSIS — I10 ESSENTIAL HYPERTENSION: ICD-10-CM

## 2018-04-27 PROBLEM — E11.40 TYPE 2 DIABETES MELLITUS WITH DIABETIC NEUROPATHY (HCC): Status: ACTIVE | Noted: 2018-04-27

## 2018-04-27 PROBLEM — E11.21 TYPE 2 DIABETES WITH NEPHROPATHY (HCC): Status: ACTIVE | Noted: 2018-04-27

## 2018-04-27 RX ORDER — ROSUVASTATIN CALCIUM 20 MG/1
20 TABLET, COATED ORAL
Qty: 30 TAB | Refills: 2 | Status: SHIPPED | OUTPATIENT
Start: 2018-04-27 | End: 2018-06-12 | Stop reason: SDUPTHER

## 2018-04-27 RX ORDER — LOSARTAN POTASSIUM AND HYDROCHLOROTHIAZIDE 25; 100 MG/1; MG/1
TABLET ORAL
Qty: 90 TAB | Refills: 1 | Status: SHIPPED | OUTPATIENT
Start: 2018-04-27 | End: 2018-10-11 | Stop reason: SDUPTHER

## 2018-04-27 RX ORDER — GABAPENTIN 400 MG/1
400 CAPSULE ORAL 3 TIMES DAILY
Qty: 90 CAP | Refills: 2 | Status: SHIPPED | OUTPATIENT
Start: 2018-04-27 | End: 2018-09-25

## 2018-04-27 RX ORDER — FLUOXETINE 20 MG/1
60 TABLET ORAL DAILY
Qty: 270 TAB | Refills: 1 | Status: SHIPPED | OUTPATIENT
Start: 2018-04-27 | End: 2018-04-27 | Stop reason: SDUPTHER

## 2018-04-27 RX ORDER — TRIAMCINOLONE ACETONIDE 5 MG/G
CREAM TOPICAL 2 TIMES DAILY
Qty: 15 G | Refills: 1 | Status: SHIPPED | OUTPATIENT
Start: 2018-04-27 | End: 2020-04-24 | Stop reason: SDUPTHER

## 2018-04-27 RX ORDER — FLUOXETINE 20 MG/1
60 TABLET ORAL DAILY
Qty: 90 TAB | Refills: 0 | Status: SHIPPED | OUTPATIENT
Start: 2018-04-27 | End: 2018-08-03 | Stop reason: SDUPTHER

## 2018-04-27 RX ORDER — AMLODIPINE BESYLATE 10 MG/1
10 TABLET ORAL DAILY
Qty: 30 TAB | Refills: 1 | Status: SHIPPED | OUTPATIENT
Start: 2018-04-27 | End: 2018-06-12 | Stop reason: SDUPTHER

## 2018-04-27 NOTE — PROGRESS NOTES
HPI:  Nina Miranda is a 59y.o. year old female who returns to clinic today for follow up: She has seen Dr Israel SIMON. Cervical MRI ordered and has had some improvement in posterior neck pain 4/10. Taking gabapentin 400 mg bid and 800 mg advil prn. Pain radiating down left arm with associated numbness and weakness and is worsening. She is also here to follow-up for AODM, hypercholesterolemia, hypertension, depression. She states blood sugars running in the 160s for the most part on average. She is tolerating her medications with no side effects. States her depression has been well controlled. Reviewed most recent lab work and LDL was not at goal triglycerides are running too high likely related to her poor diabetes. She does state that she has been taking her atorvastatin daily. Current Outpatient Prescriptions   Medication Sig Dispense Refill    gabapentin (NEURONTIN) 100 mg capsule 100 mg po every morning and 300 mg every evening. 120 Cap 1    FLUoxetine (PROZAC) 20 mg tablet Take 3 Tabs by mouth daily. 270 Tab 1    amLODIPine (NORVASC) 5 mg tablet TAKE 1 TABLET DAILY 90 Tab 1    cholecalciferol, vitamin D3, (VITAMIN D3) 2,000 unit tab Take  by mouth.  losartan-hydroCHLOROthiazide (HYZAAR) 100-25 mg per tablet TAKE 1 TABLET DAILY FOR    HYPERTENSION 90 Tab 1    CETIRIZINE HCL (ZYRTEC PO) Take  by mouth.  atorvastatin (LIPITOR) 40 mg tablet TAKE 1 TABLET NIGHTLY 90 Tab 0    triamcinolone (NASACORT AQ) 55 mcg nasal inhaler 2 Sprays daily.  insulin CONCENTRATED regular (U-500 CONCENTRATED) 500 unit/mL soln by SubCUTAneous route. 19 units 3 times a day with meals. Patient states 20-25 units depending on her blood sugars. Indications: humilin      metFORMIN ER (GLUCOPHAGE XR) 500 mg tablet Take 2 tablets by mouth two (2) times a day. 120 tablet 0    Insulin Syringe-Needle U-100 1 mL 30 x 5/16\" syrg 1 Syringe by SubCUTAneous route daily as needed.       FLORIN PEN NEEDLE 32 x 5/32 \" ndle TEST BLOOD SUGAR IF GREATER THAN 180, CALCULATE CARB INTAKE FOR MEAL, AND GIVE 15-20 UNITS AT MEALS. 100 Each 3    Insulin Syringe-Needle U-100 (BD INSULIN SYRINGE ULTRA-FINE) 1 mL 30 x 1/2\" syrg Use as directed. DX:250 100 Syringe 11    ibuprofen (ADVIL) 200 mg tablet Take 3 Tabs by mouth every six (6) hours as needed for Pain. 60 Tab 0    aspirin 81 mg Tab Take  by mouth.  biotin 10,000 mcg TbDi Take  by mouth.  loratadine (CLARITIN) 10 mg tablet Take 10 mg by mouth daily. Allergies   Allergen Reactions    Tenormin [Atenolol] Other (comments)     depression    Vicodin [Hydrocodone-Acetaminophen] Nausea and Vomiting     Social History   Substance Use Topics    Smoking status: Former Smoker     Packs/day: 2.00     Years: 20.00     Quit date: 6/1/1992    Smokeless tobacco: Never Used    Alcohol use No      Comment: rarely         Review of Systems   Constitutional: Negative for malaise/fatigue. Respiratory: Negative for shortness of breath. Cardiovascular: Negative for chest pain and leg swelling. Gastrointestinal: Negative for abdominal pain and heartburn. Skin:        She notes an itchy area on her earlobes has returned. Previously resolved with steroid cream.  Also has itchy patch on the top of her scalp. Neurological: Negative for dizziness and headaches. Physical Exam   Constitutional: She appears well-developed. No distress. /90 (BP 1 Location: Left arm, BP Patient Position: Sitting)  Pulse 99  Temp 98.2 °F (36.8 °C) (Oral)   Resp 16  Ht 5' 2\" (1.575 m)  Wt 232 lb 9.6 oz (105.5 kg)  LMP 06/01/1998  SpO2 97%  BMI 42.54 kg/m2   Neck: Carotid bruit is not present. Cardiovascular: Normal rate, regular rhythm, normal heart sounds and intact distal pulses. No murmur heard. Pulmonary/Chest: Effort normal and breath sounds normal. She has no wheezes. Abdominal: Soft. Bowel sounds are normal. She exhibits no distension and no mass. There is no tenderness. Musculoskeletal: She exhibits no edema. Posterior neck nontender. Some pain with range of motion. Neurological:   Left arm decreased fine touch lateral elbow and motor left upper extremity 5- out of 5. DTRs equal.   Skin:   Scaling and mild erythema over bilateral earlobes. Erythematous patch mid upper scalp with some scaling. Psychiatric: She has a normal mood and affect. Vitals reviewed. Monofilament intact bilaterally. Pulses 2+ bilaterally. No skin lesions or open wounds. Assessment & Plan:    ICD-10-CM ICD-9-CM    1. Cervical radiculopathy  Improved but still significant pain. She stopped taking gabapentin 400 mg twice daily. She does find by midafternoon she is having increased pain. Will increase gabapentin to 400 mg 3 times daily. Reviewed potential side effects. She is tolerating medication well at up to this point. And denies any fluid retention. M54.12 723.4    2. Type 2 diabetes mellitus without complication, with long-term current use of insulin ([facility]  Managed by endocrine and her A1c's have not been at goal and they are working on making changes in medications and lifestyle. E11.9 250.00     Z79.4 V58.67    3. Essential hypertension  Not at goal and will increase amlodipine to 10 mg every morning. Counseled regarding potential side effects. Avoiding salt importance of weight loss. Counseled regarding elevated BMI and current weight goals. Reviewed weight loss strategies including dietary changes and exercise. I10 401.9    4. Hyperlipidemia LDL goal <100  Not at goal and will stop atorvastatin and start Crestor 20 mg daily follow-up in 6 weeks for repeat lab work and appointment. E78.5 272.4 LIPID PANEL      METABOLIC PANEL, COMPREHENSIVE   5. Major depressive disorder with single episode, in full remission ([facility]  At goal continue current medication. F32.5 296.26    6.  Eczema, unspecified type bilateral ears  Triamcinolone cream twice daily as needed L30.9 692.9    7. Seborrhea scalp trial of Selsun shampoo. L21.9 706.3         Follow-up Disposition:  Return in about 6 weeks (around 6/8/2018) for follow up. Advised her to call back or return to office if symptoms worsen/change/persist.  Discussed expected course/resolution/complications of diagnosis in detail with patient. Medication risks/benefits/costs/interactions/alternatives discussed with patient. She was given an after visit summary which includes diagnoses, current medications, & vitals. She expressed understanding with the diagnosis and plan.

## 2018-04-27 NOTE — MR AVS SNAPSHOT
[address] [place] [address] 
[phone] Patient: Marco A Briceno MRN:  [id number] Visit Information Date & Time Provider Department Dept. Phone Encounter #  
 2018  8:00 AM Rose Mcelroy, [address] [place] [address] Internal Medicine [phone] [id number] Follow-up Instructions Return in about 6 weeks (around 2018) for follow up. Upcoming Health Maintenance Date Due  
 EYE EXAM RETINAL OR DILATED Q1 2018 Influenza Age 5 to Adult 2018 HEMOGLOBIN A1C Q6M 2018 LIPID PANEL Q1 3/29/2019 MICROALBUMIN Q1 2019 FOOT EXAM Q1 2019 GLAUCOMA SCREENING Q2Y 2019 COLONOSCOPY 3/1/2025 DTaP/Tdap/Td series (3 - Td) 3/30/2027 Allergies as of 2018  Review Complete On: 2018 By: Oli Henley LPN Severity Noted Reaction Type Reactions Tenormin [Atenolol]  2009    Other (comments) depression Vicodin [Hydrocodone-acetaminophen]  2009    Nausea and Vomiting Current Immunizations  Reviewed on 2016 Name Date Influenza Vaccine 10/2/2015, 2014, 2013 Influenza Vaccine (Quad) PF 2016 Influenza Vaccine Whole 2012 Pneumococcal Polysaccharide (PPSV-23) 2013 TDAP Vaccine 2008 Tdap 3/30/2017 Yellow Fever Vaccine 3/30/2017 ZZZ-RETIRED (DO NOT USE) Pneumococcal Vaccine (Unspecified Type) 2002 Zoster Vaccine, Live 2014 Not reviewed this visit You Were Diagnosed With   
  
 Codes Comments Cervical radiculopathy    -  Primary ICD-10-CM: M54.12 
ICD-9-CM: 723.4 Type 2 diabetes mellitus without complication, with long-term current use of insulin (HCC)     ICD-10-CM: E11.9, Z79.4 ICD-9-CM: 250.00, V58.67 Essential hypertension     ICD-10-CM: I10 
ICD-9-CM: 401.9 Hyperlipidemia LDL goal <100     ICD-10-CM: E78.5 ICD-9-CM: 272.4 Major depressive disorder with single episode, in full remission ([facility] [address]     ICD-10-CM: F32.5 ICD-9-CM: 296.26 Eczema, unspecified type     ICD-10-CM: L30.9 ICD-9-CM: 692.9 Seborrhea     ICD-10-CM: L21.9 ICD-9-CM: 706. 3 Vitals BP Pulse Temp Resp Height(growth percentile) Weight(growth percentile) 160/90 (BP 1 Location: Left arm, BP Patient Position: Sitting) 99 98.2 °F (36.8 °C) (Oral) 16 5' 2\" (1.575 m) 232 lb 9.6 oz (105.5 kg) LMP SpO2 BMI OB Status Smoking Status 06/01/1998 97% 42.54 kg/m2 Hysterectomy Former Smoker BMI and BSA Data Body Mass Index Body Surface Area 42.54 kg/m 2 2.15 m 2 Preferred Pharmacy Pharmacy Name Phone [facility]/PHARMACY #[id number] [place] [phone] Your Updated Medication List  
  
   
This list is accurate as of 4/27/18  8:48 AM.  Always use your most recent med list. ADVIL 200 mg tablet Generic drug:  ibuprofen Take 3 Tabs by mouth every six (6) hours as needed for Pain. amLODIPine 10 mg tablet Commonly known as:  Juan A Rings Take 1 Tab by mouth daily. aspirin 81 mg tablet Take  by mouth.  
  
 biotin 10,000 mcg Tbdi Take  by mouth. FLUoxetine 20 mg tablet Commonly known as:  PROzac Take 3 Tabs by mouth daily. gabapentin 400 mg capsule Commonly known as:  NEURONTIN Take 1 Cap by mouth three (3) times daily. insulin CONCENTRATED regular 500 unit/mL Soln Commonly known as:  U-500 CONCENTRATED  
by SubCUTAneous route. 19 units 3 times a day with meals. Patient states 20-25 units depending on her blood sugars. Indications: humilin * Insulin Syringe-Needle U-100 1 mL 30 gauge x 1/2\" Syrg Commonly known as:  BD INSULIN SYRINGE ULTRA-FINE Use as directed. DX:250 * Insulin Syringe-Needle U-100 1 mL 30 gauge x 5/16 Syrg 1 Syringe by SubCUTAneous route daily as needed. loratadine 10 mg tablet Commonly known as:  Brisa Galea Take 10 mg by mouth daily. losartan-hydroCHLOROthiazide 100-25 mg per tablet Commonly known as:  HYZAAR  
TAKE 1 TABLET DAILY FOR    HYPERTENSION  
  
 metFORMIN  mg tablet Commonly known as:  GLUCOPHAGE XR Take 2 tablets by mouth two (2) times a day. Patrica Pen Needle 32 gauge x 5/32\" Ndle Generic drug:  Insulin Needles (Disposable) TEST BLOOD SUGAR IF GREATER THAN 180, CALCULATE CARB INTAKE FOR MEAL, AND GIVE 15-20 UNITS AT MEALS. * NASACORT AQ 55 mcg nasal inhaler Generic drug:  triamcinolone 2 Sprays daily. * triamcinolone 0.5 % topical cream  
Commonly known as:  ARISTOCORT Apply  to affected area two (2) times a day. use thin layer as needed  
  
 rosuvastatin 20 mg tablet Commonly known as:  CRESTOR Take 1 Tab by mouth nightly. VITAMIN D3 2,000 unit Tab Generic drug:  cholecalciferol (vitamin D3) Take  by mouth. ZYRTEC PO Take  by mouth. * Notice: This list has 4 medication(s) that are the same as other medications prescribed for you. Read the directions carefully, and ask your doctor or other care provider to review them with you. Prescriptions Printed Refills  
 rosuvastatin (CRESTOR) 20 mg tablet 2 Sig: Take 1 Tab by mouth nightly. Class: Print Route: Oral  
 losartan-hydroCHLOROthiazide (HYZAAR) 100-25 mg per tablet 1 Sig: TAKE 1 TABLET DAILY FOR    HYPERTENSION Class: Print Prescriptions Sent to Pharmacy Refills  
 gabapentin (NEURONTIN) 400 mg capsule 2 Sig: Take 1 Cap by mouth three (3) times daily. Class: Normal  
 Pharmacy: [facility] #: [phone] Route: Oral  
 triamcinolone (ARISTOCORT) 0.5 % topical cream 1 Sig: Apply  to affected area two (2) times a day. use thin layer as needed  Class: Normal  
 Pharmacy: [facility]/pharmacy #[id number]- [address] Ph #: [phone] Route: Topical  
 amLODIPine (NORVASC) 10 mg tablet 1 Sig: Take 1 Tab by mouth daily. Class: Normal  
 Pharmacy: [facility] #: [phone] Route: Oral  
 FLUoxetine (PROZAC) 20 mg tablet 0 Sig: Take 3 Tabs by mouth daily. Class: Normal  
 Pharmacy: [facility] #: [phone] Route: Oral  
  
We Performed the Following LIPID PANEL [72708 CPT(R)] METABOLIC PANEL, COMPREHENSIVE [87013 CPT(R)] Follow-up Instructions Return in about 6 weeks (around 6/8/2018) for follow up. To-Do List   
 04/30/2018 7:30 AM  
  Appointment with [facility] MRI 1 at [facility] ([phone]) 1. Please bring a list or a bag of your current medications to your appointment 2. Please be sure to remove ALL hair clips, pins, extensions, etc., prior to arriving for your MRI procedure. 3. If you have any medical implants or devices, please bring associated medical card with you. 4. Bring any non [facility] films or CDs pertaining to the area being imaged with you on the day of appointment. 5. A written order with a valid diagnosis and Physicians  signature is required for all scheduled tests. 6. Check in at registration 30min before your appointment time unless you were instructed to do otherwise. Introducing [address]! Dear Nury Antonio: Thank you for requesting a [employer] account. Our records indicate that you already have an active [employer] account. You can access your account anytime at [email or web address] Did you know that you can access your hospital and ER discharge instructions at any time in [employer]?   You can also review all of your test results from your hospital stay or ER visit. Additional Information If you have questions, please visit the Frequently Asked Questions section of the [employer] website at [email or web address] Remember, [employer] is NOT to be used for urgent needs. For medical emergencies, dial 911. Now available from your iPhone and Android! Please provide this summary of care documentation to your next provider. Your primary care clinician is listed as Philomena Murray. If you have any questions after today's visit, please call [phone].

## 2018-04-30 ENCOUNTER — HOSPITAL ENCOUNTER (OUTPATIENT)
Dept: MRI IMAGING | Age: 58
Discharge: HOME | End: 2018-04-30
Payer: COMMERCIAL

## 2018-04-30 DIAGNOSIS — M54.2 CERVICALGIA: ICD-10-CM

## 2018-04-30 DIAGNOSIS — M50.30 DEGENERATIVE CERVICAL DISC: ICD-10-CM

## 2018-04-30 PROCEDURE — 72141 MRI NECK SPINE W/O DYE: CPT

## 2018-06-09 LAB
ALBUMIN SERPL-MCNC: 4.3 G/DL (ref 3.5–5.5)
ALBUMIN/GLOB SERPL: 1.9 {RATIO} (ref 1.2–2.2)
ALP SERPL-CCNC: 112 IU/L (ref 39–117)
ALT SERPL-CCNC: 26 IU/L (ref 0–32)
AST SERPL-CCNC: 20 IU/L (ref 0–40)
BILIRUB SERPL-MCNC: 0.2 MG/DL (ref 0–1.2)
BUN SERPL-MCNC: 13 MG/DL (ref 6–24)
BUN/CREAT SERPL: 23 (ref 9–23)
CALCIUM SERPL-MCNC: 9.8 MG/DL (ref 8.7–10.2)
CHLORIDE SERPL-SCNC: 98 MMOL/L (ref 96–106)
CHOLEST SERPL-MCNC: 118 MG/DL (ref 100–199)
CO2 SERPL-SCNC: 25 MMOL/L (ref 18–29)
CREAT SERPL-MCNC: 0.57 MG/DL (ref 0.57–1)
GFR SERPLBLD CREATININE-BSD FMLA CKD-EPI: 102 ML/MIN/1.73
GFR SERPLBLD CREATININE-BSD FMLA CKD-EPI: 117 ML/MIN/1.73
GLOBULIN SER CALC-MCNC: 2.3 G/DL (ref 1.5–4.5)
GLUCOSE SERPL-MCNC: 86 MG/DL (ref 65–99)
HDLC SERPL-MCNC: 43 MG/DL
LDLC SERPL CALC-MCNC: 45 MG/DL (ref 0–99)
POTASSIUM SERPL-SCNC: 4.5 MMOL/L (ref 3.5–5.2)
PROT SERPL-MCNC: 6.6 G/DL (ref 6–8.5)
SODIUM SERPL-SCNC: 138 MMOL/L (ref 134–144)
TRIGL SERPL-MCNC: 151 MG/DL (ref 0–149)
VLDLC SERPL CALC-MCNC: 30 MG/DL (ref 5–40)

## 2018-06-12 ENCOUNTER — OFFICE VISIT (OUTPATIENT)
Dept: INTERNAL MEDICINE CLINIC | Age: 58
End: 2018-06-12

## 2018-06-12 VITALS
TEMPERATURE: 98.9 F | BODY MASS INDEX: 42.07 KG/M2 | SYSTOLIC BLOOD PRESSURE: 136 MMHG | HEIGHT: 62 IN | WEIGHT: 228.6 LBS | DIASTOLIC BLOOD PRESSURE: 60 MMHG | OXYGEN SATURATION: 99 % | HEART RATE: 70 BPM | RESPIRATION RATE: 16 BRPM

## 2018-06-12 DIAGNOSIS — E78.5 HYPERLIPIDEMIA LDL GOAL <100: ICD-10-CM

## 2018-06-12 DIAGNOSIS — Z79.4 TYPE 2 DIABETES MELLITUS WITHOUT COMPLICATION, WITH LONG-TERM CURRENT USE OF INSULIN (HCC): ICD-10-CM

## 2018-06-12 DIAGNOSIS — E11.9 TYPE 2 DIABETES MELLITUS WITHOUT COMPLICATION, WITH LONG-TERM CURRENT USE OF INSULIN (HCC): ICD-10-CM

## 2018-06-12 DIAGNOSIS — I10 ESSENTIAL HYPERTENSION: Primary | ICD-10-CM

## 2018-06-12 RX ORDER — AMLODIPINE BESYLATE 10 MG/1
10 TABLET ORAL DAILY
Qty: 30 TAB | Refills: 1 | Status: SHIPPED | OUTPATIENT
Start: 2018-06-12 | End: 2018-07-28 | Stop reason: SDUPTHER

## 2018-06-12 RX ORDER — ROSUVASTATIN CALCIUM 20 MG/1
20 TABLET, COATED ORAL
Qty: 30 TAB | Refills: 2 | Status: SHIPPED | OUTPATIENT
Start: 2018-06-12 | End: 2018-08-24 | Stop reason: SDUPTHER

## 2018-06-12 NOTE — PROGRESS NOTES
HPI:  Zack Witt is a 59y.o. year old female who returns to clinic today for follow up: HTN and hypercholesterolemia. 1.  Cardiovascular in adult onset diabetes mellitus.: She reports taking medications as instructed, no medication side effects noted, The home BP readings outside of office have been not checking. .  Diet and Lifestyle: sometimes follows a low fat low cholesterol diet, generally follows a low sodium diet. Exercise: none. 2.  DM: blood sugar running on average 181 for 7 days, 165 last 30 days and 157 last 90 days. States sometimes following diabetic diet. Taking medication daily. She is followed by endocrine. And has upcoming appointment. Current Outpatient Prescriptions   Medication Sig Dispense Refill    gabapentin (NEURONTIN) 400 mg capsule Take 1 Cap by mouth three (3) times daily. 90 Cap 2    rosuvastatin (CRESTOR) 20 mg tablet Take 1 Tab by mouth nightly. 30 Tab 2    losartan-hydroCHLOROthiazide (HYZAAR) 100-25 mg per tablet TAKE 1 TABLET DAILY FOR    HYPERTENSION 90 Tab 1    triamcinolone (ARISTOCORT) 0.5 % topical cream Apply  to affected area two (2) times a day. use thin layer as needed 15 g 1    amLODIPine (NORVASC) 10 mg tablet Take 1 Tab by mouth daily. 30 Tab 1    FLUoxetine (PROZAC) 20 mg tablet Take 3 Tabs by mouth daily. 90 Tab 0    cholecalciferol, vitamin D3, (VITAMIN D3) 2,000 unit tab Take  by mouth.  triamcinolone (NASACORT AQ) 55 mcg nasal inhaler 2 Sprays daily.  insulin CONCENTRATED regular (U-500 CONCENTRATED) 500 unit/mL soln by SubCUTAneous route. 19 units 3 times a day with meals. Patient states 20-25 units depending on her blood sugars. Indications: humilin      metFORMIN ER (GLUCOPHAGE XR) 500 mg tablet Take 2 tablets by mouth two (2) times a day. 120 tablet 0    Insulin Syringe-Needle U-100 1 mL 30 x 5/16\" syrg 1 Syringe by SubCUTAneous route daily as needed.       FLORIN PEN NEEDLE 32 x 5/32 \" ndle TEST BLOOD SUGAR IF GREATER THAN 180, CALCULATE CARB INTAKE FOR MEAL, AND GIVE 15-20 UNITS AT MEALS. 100 Each 3    Insulin Syringe-Needle U-100 (BD INSULIN SYRINGE ULTRA-FINE) 1 mL 30 x 1/2\" syrg Use as directed. DX:250 100 Syringe 11    loratadine (CLARITIN) 10 mg tablet Take 10 mg by mouth daily.  ibuprofen (ADVIL) 200 mg tablet Take 3 Tabs by mouth every six (6) hours as needed for Pain. 60 Tab 0    aspirin 81 mg Tab Take  by mouth.  biotin 10,000 mcg TbDi Take  by mouth. Allergies   Allergen Reactions    Tenormin [Atenolol] Other (comments)     depression    Vicodin [Hydrocodone-Acetaminophen] Nausea and Vomiting     Social History   Substance Use Topics    Smoking status: Former Smoker     Packs/day: 2.00     Years: 20.00     Quit date: 6/1/1992    Smokeless tobacco: Never Used    Alcohol use No      Comment: rarely         Review of Systems   Constitutional: Negative for malaise/fatigue. Respiratory: Negative for shortness of breath. Cardiovascular: Negative for chest pain and leg swelling. Gastrointestinal: Negative for abdominal pain and heartburn. Neurological: Negative for dizziness and headaches. Physical Exam   Constitutional: She appears well-developed. No distress. /60 (BP 1 Location: Left arm, BP Patient Position: Sitting)  Pulse 70  Temp 98.9 °F (37.2 °C) (Oral)   Resp 16  Ht 5' 2\" (1.575 m)  Wt 228 lb 9.6 oz (103.7 kg)  LMP 06/01/1998  SpO2 99%  BMI 41.81 kg/m2   Neck: Carotid bruit is not present. Cardiovascular: Normal rate, regular rhythm, normal heart sounds and intact distal pulses. No murmur heard. Pulmonary/Chest: Effort normal and breath sounds normal. She has no wheezes. Abdominal: Soft. Bowel sounds are normal. She exhibits no distension and no mass. There is no tenderness. Musculoskeletal: She exhibits no edema. Psychiatric: She has a normal mood and affect. Vitals reviewed. Assessment & Plan:    ICD-10-CM ICD-9-CM    1.  Essential hypertension  Stable and will continue to work on lifestyle changes and continue current medication. I10 401.9    2. Hyperlipidemia LDL goal <100  Check last lab work and continue current medication. Continue current medication. E78.5 272.4    3. Type 2 diabetes mellitus without complication, with long-term current use of insulin ([address]  Followed by endocrine and will obtain most recent lab work. E11.9 250.00 HEMOGLOBIN A1C WITH EAG    Z79.4 V58.67       Has appt with endocrine. Eye exam scheduled for 7/23/18  Follow-up Disposition:  Return in about 6 months (around 12/12/2018). Advised her to call back or return to office if symptoms worsen/change/persist.  Discussed expected course/resolution/complications of diagnosis in detail with patient. Medication risks/benefits/costs/interactions/alternatives discussed with patient. She was given an after visit summary which includes diagnoses, current medications, & vitals. She expressed understanding with the diagnosis and plan.

## 2018-06-12 NOTE — PROGRESS NOTES
Chief Complaint   Patient presents with    Hypertension    Diabetes     Patient states she is here for her 6 week follow up.

## 2018-09-13 ENCOUNTER — HOSPITAL ENCOUNTER (OUTPATIENT)
Dept: CT IMAGING | Age: 58
Discharge: HOME | End: 2018-09-13
Attending: ORTHOPAEDIC SURGERY
Payer: COMMERCIAL

## 2018-09-13 ENCOUNTER — HOSPITAL ENCOUNTER (OUTPATIENT)
Dept: GENERAL RADIOLOGY | Age: 58
Discharge: HOME | End: 2018-09-13
Attending: ORTHOPAEDIC SURGERY
Payer: COMMERCIAL

## 2018-09-13 VITALS
DIASTOLIC BLOOD PRESSURE: 57 MMHG | SYSTOLIC BLOOD PRESSURE: 142 MMHG | RESPIRATION RATE: 18 BRPM | OXYGEN SATURATION: 95 % | TEMPERATURE: 97.9 F | HEART RATE: 90 BPM

## 2018-09-13 DIAGNOSIS — M50.123 DISORDER OF INTERVERTEBRAL DISC AT C6-C7 LEVEL WITH RADICULOPATHY: ICD-10-CM

## 2018-09-13 DIAGNOSIS — M50.30 DDD (DEGENERATIVE DISC DISEASE), CERVICAL: ICD-10-CM

## 2018-09-13 DIAGNOSIS — R29.898 LEFT ARM WEAKNESS: ICD-10-CM

## 2018-09-13 PROCEDURE — 72126 CT NECK SPINE W/DYE: CPT

## 2018-09-13 PROCEDURE — 62302 MYELOGRAPHY LUMBAR INJECTION: CPT

## 2018-09-13 PROCEDURE — 74011250636 HC RX REV CODE- 250/636

## 2018-09-13 PROCEDURE — 74011636320 HC RX REV CODE- 636/320: Performed by: RADIOLOGY

## 2018-09-13 RX ORDER — LIDOCAINE HYDROCHLORIDE 10 MG/ML
INJECTION, SOLUTION EPIDURAL; INFILTRATION; INTRACAUDAL; PERINEURAL
Status: COMPLETED
Start: 2018-09-13 | End: 2018-09-13

## 2018-09-13 RX ADMIN — IOHEXOL 50 ML: 240 INJECTION, SOLUTION INTRATHECAL; INTRAVASCULAR; INTRAVENOUS; ORAL at 10:43

## 2018-09-13 RX ADMIN — LIDOCAINE HYDROCHLORIDE 30 ML: 10 INJECTION, SOLUTION EPIDURAL; INFILTRATION; INTRACAUDAL; PERINEURAL at 10:43

## 2018-09-13 NOTE — DISCHARGE INSTRUCTIONS
[address] [facility]  Department of Interventional Radiology  POST MYELOGRAM DISCHARGE INSTRUCTIONS  General Information:  Myelogram:   A Myelogram involves a lumbar puncture, and instead of removing fluid, contrast will be injected into the sac surrounding the spinal column. It is done to visualize the spinal column, nerve roots, spinal canal, vertebral discs and disc space. It is usually done to diagnose back pain with unknown cause or in preparation for surgery. After the injection, a CT scan will be done, usually within two hours of the injection. Call If:   You should call your Physician and/or the Radiology Nurse if you develop a headache that is not relieved by Tylenol, and worsens when you stand and eases when you lie down, you need to call. You may have developed what is referred to as a spinal headache. Our physicians will probably advise you to be on strict bed rest for 24 hours, to drink lots of fluids and caffeine. If this does not help the head pain, call again the next day. You should call if you have bleeding other than a small spot on your bandage. You should call if you have any numbness, tingling, weakness, fever, chills, urinary retention, severe itching, rash, welts, swelling, or confusion. Follow-up Instructions: See the doctor who ordered your procedure as he/she has instructed. If you had a Lumbar Puncture or Myelogram, your results should be available to your ordering doctor in 3-5 business days. You can remove your dressing in 24 hours and shower regularly. Do not bathe or swim for 72 hours.    To Reach Us:[phone] 730 to 10 pm then [phone] after 10 pm  Patient Signature:  Date: 9/13/2018  Discharging Nurse: Pamela Toribio RN

## 2018-09-25 ENCOUNTER — OFFICE VISIT (OUTPATIENT)
Dept: INTERNAL MEDICINE CLINIC | Age: 58
End: 2018-09-25

## 2018-09-25 VITALS
HEIGHT: 62 IN | BODY MASS INDEX: 42.33 KG/M2 | SYSTOLIC BLOOD PRESSURE: 132 MMHG | TEMPERATURE: 99.1 F | DIASTOLIC BLOOD PRESSURE: 60 MMHG | WEIGHT: 230 LBS | RESPIRATION RATE: 16 BRPM | HEART RATE: 105 BPM | OXYGEN SATURATION: 96 %

## 2018-09-25 DIAGNOSIS — Z01.810 PREOP CARDIOVASCULAR EXAM: Primary | ICD-10-CM

## 2018-09-25 DIAGNOSIS — E78.5 HYPERLIPIDEMIA LDL GOAL <100: ICD-10-CM

## 2018-09-25 DIAGNOSIS — M50.90 CERVICAL DISC DISEASE: ICD-10-CM

## 2018-09-25 DIAGNOSIS — E11.21 TYPE 2 DIABETES WITH NEPHROPATHY (HCC): ICD-10-CM

## 2018-09-25 DIAGNOSIS — G47.33 OBSTRUCTIVE SLEEP APNEA SYNDROME: ICD-10-CM

## 2018-09-25 DIAGNOSIS — Z23 ENCOUNTER FOR IMMUNIZATION: ICD-10-CM

## 2018-09-25 DIAGNOSIS — Z90.13 H/O BILATERAL MASTECTOMY: ICD-10-CM

## 2018-09-25 DIAGNOSIS — I10 ESSENTIAL HYPERTENSION: ICD-10-CM

## 2018-09-25 RX ORDER — INSULIN HUMAN 500 [IU]/ML
INJECTION, SOLUTION SUBCUTANEOUS
COMMUNITY

## 2018-09-25 NOTE — PROGRESS NOTES
HPI:  Karma Aleman is a 59y.o. year old female who presents today for preoperative medical evaluation. Procedure/Surgery:cervical disc fusion general anesthesia  Date of Procedure/Surgery: 10/18/18  Surgeon: Vincenzo Pass: Anibal Jaimie's  Latex Allergy: no    Recent use of: ASA  Anesthesia Complications: None  History of abnormal bleeding : None  Health Care Directive or Living Will: yes  Positive history of sleep apnea and is using CPAP nightly. She has had resolution of her cervical pain but has left arm weakness. She has tapered off of gabapentin. Cardiovascular: She reports taking medications as instructed, no medication side effects noted, The home  Diet and Lifestyle: generally follows a low fat low cholesterol diet, generally follows a low sodium diet. Exercise: walking. Current Outpatient Prescriptions   Medication Sig Dispense Refill    FLUoxetine (PROZAC) 20 mg tablet TAKE 3 TABLETS BY MOUTH DAILY 90 Tab 5    rosuvastatin (CRESTOR) 20 mg tablet TAKE 1 TABLET BY MOUTH NIGHTLY 90 Tab 1    amLODIPine (NORVASC) 10 mg tablet TAKE 1 TABLET BY MOUTH DAILY 60 Tab 1    losartan-hydroCHLOROthiazide (HYZAAR) 100-25 mg per tablet TAKE 1 TABLET DAILY FOR    HYPERTENSION 90 Tab 1    triamcinolone (ARISTOCORT) 0.5 % topical cream Apply  to affected area two (2) times a day. use thin layer as needed 15 g 1    cholecalciferol, vitamin D3, (VITAMIN D3) 2,000 unit tab Take  by mouth.  triamcinolone (NASACORT AQ) 55 mcg nasal inhaler 2 Sprays daily.  insulin CONCENTRATED regular (U-500 CONCENTRATED) 500 unit/mL soln by SubCUTAneous route. 19 units 3 times a day with meals. Patient states 20-25 units depending on her blood sugars. Indications: humilin      metFORMIN ER (GLUCOPHAGE XR) 500 mg tablet Take 2 tablets by mouth two (2) times a day.  120 tablet 0    FLORIN PEN NEEDLE 32 x 5/32 \" ndle TEST BLOOD SUGAR IF GREATER THAN 180, CALCULATE CARB INTAKE FOR MEAL, AND GIVE 15-20 UNITS AT MEALS. 100 Each 3    Insulin Syringe-Needle U-100 (BD INSULIN SYRINGE ULTRA-FINE) 1 mL 30 x 1/2\" syrg Use as directed. DX:250 100 Syringe 11    loratadine (CLARITIN) 10 mg tablet Take 10 mg by mouth daily.  ibuprofen (ADVIL) 200 mg tablet Take 3 Tabs by mouth every six (6) hours as needed for Pain. 60 Tab 0    aspirin 81 mg Tab Take  by mouth.        Allergies   Allergen Reactions    Tenormin [Atenolol] Other (comments)     depression    Vicodin [Hydrocodone-Acetaminophen] Nausea and Vomiting     Past Medical History:   Diagnosis Date    breast     breast; right DCIS    Depression     Incisional hernia 2012    TO RIGHT AND BELOW NAVAL    Microalbuminuria     Migraine     Osteopenia 2015    Other and unspecified hyperlipidemia 2009    Sleep apnea     c-pap; nightly    Type 2 diabetes mellitus with diabetic neuropathy ([facility] [address] 2018    Type 2 diabetes with nephropathy ([facility] [address] 2018    Unspecified essential hypertension 2009     Past Surgical History:   Procedure Laterality Date    HX BREAST RECONSTRUCTION      HX  SECTION      HX CHOLECYSTECTOMY      HX HEENT      cleft palate and lip repair    HX MASTECTOMY  2008    bilateral    HX ORTHOPAEDIC      right knee     ARTHROSCOPIC    HX ORTHOPAEDIC  14    right RTC repair    HX WALESKA AND BSO      RECONST CLEFT PALATE,SOFT/HARD      SINUS SURGERY PROC UNLISTED  X2     Family History   Problem Relation Age of Onset    Cancer Mother      ovarian    Lung Disease Mother      copd, pulm HTN    Diabetes Father     Cancer Father      cordoma, sarcoma    Heart Disease Father 67     CAD, stint placed    Heart Disease Maternal Grandfather     Heart Disease Maternal Aunt      Social History   Substance Use Topics    Smoking status: Former Smoker     Packs/day: 2.00     Years: 20.00     Quit date: 1992    Smokeless tobacco: Never Used    Alcohol use Yes      Comment: rarely             Review of Systems   Constitutional: Negative for malaise/fatigue. Respiratory: Negative for shortness of breath. Cardiovascular: Negative for chest pain and leg swelling. Gastrointestinal: Negative for abdominal pain and heartburn. Genitourinary: Negative for dysuria, frequency and urgency. Neurological: Negative for dizziness and headaches. Psychiatric/Behavioral: Negative for depression and suicidal ideas. Physical Exam   Constitutional: She appears well-developed. No distress. HENT:   Head: Normocephalic. Nose: Nose normal.   Mouth/Throat: Oropharynx is clear and moist.   Eyes: Conjunctivae and EOM are normal. Pupils are equal, round, and reactive to light. Neck: Carotid bruit is not present. No thyromegaly present. Cardiovascular: Normal rate, regular rhythm, normal heart sounds and intact distal pulses. No murmur heard. Pulmonary/Chest: Effort normal and breath sounds normal. She has no wheezes. Abdominal: Soft. Bowel sounds are normal. She exhibits no distension and no mass. There is no tenderness. Musculoskeletal: She exhibits no edema. Lymphadenopathy:     She has no cervical adenopathy. Psychiatric: She has a normal mood and affect. Vitals reviewed. Visit Vitals    /60    Pulse (!) 105    Temp 99.1 °F (37.3 °C) (Oral)    Resp 16    Ht 5' 2\" (1.575 m)    Wt 230 lb (104.3 kg)    LMP 06/01/1998    SpO2 96%    BMI 42.07 kg/m2       Assessment & Plan:    ICD-10-CM ICD-9-CM    1. Preop cardiovascular exam  No contraindication to planned surgical procedure. EKG normal.  Preoperative lab work reviewed and is fine for surgery. Z01.810 V72.81 AMB POC EKG ROUTINE W/ 12 LEADS, INTER & REP      CBC WITH AUTOMATED DIFF      METABOLIC PANEL, COMPREHENSIVE      UA/M W/RFLX CULTURE, ROUTINE      PROTHROMBIN TIME + INR   2. Cervical disc disease M50.90 722.91    3.  Type 2 diabetes with nephropathy (HCC)  We will obtain lab work to assess for goal.  Continue with diabetic diet and current medications as per her endocrinologist. R32.97 020.84 METABOLIC PANEL, COMPREHENSIVE     583.81 HEMOGLOBIN A1C WITH EAG   4. Essential hypertension  Well-controlled continue current medication. I10 401.9    5. Hyperlipidemia LDL goal <100   E78.5 272.4 LIPID PANEL   6. Obstructive sleep apnea syndrome  Controlled with CPAP. G47.33 327.23    7. H/O bilateral mastectomy Z90.13 V45.71    8. Encounter for immunization Z23 V03.89 INFLUENZA VIRUS VAC QUAD,SPLIT,PRESV FREE SYRINGE IM     Orders Placed This Encounter    Influenza virus vaccine (QUADRIVALENT PRES FREE SYRINGE) IM (83020)    CBC WITH AUTOMATED DIFF    METABOLIC PANEL, COMPREHENSIVE    LIPID PANEL    HEMOGLOBIN A1C WITH EAG    UA/M W/RFLX CULTURE, ROUTINE    PROTHROMBIN TIME + INR    AMB POC EKG ROUTINE W/ 12 LEADS, INTER & REP    insulin CONCENTRATED regular (HUMULIN R U-500) 500 unit/mL soln          Follow-up Disposition:  Return in about 6 months (around 3/25/2019). Advised her to call back or return to office if symptoms worsen/change/persist.  Discussed expected course/resolution/complications of diagnosis in detail with patient. Medication risks/benefits/costs/interactions/alternatives discussed with patient. She was given an after visit summary which includes diagnoses, current medications, & vitals. She expressed understanding with the diagnosis and plan.

## 2018-09-25 NOTE — PROGRESS NOTES
Chief Complaint   Patient presents with   [facility] Pre-op Exam     surgery 10/2/2018    Immunization/Injection     Patient states she is here for her pre-op exam as surgery is scheduled for 10/2/2018 for cervical fusion.

## 2018-09-25 NOTE — PATIENT INSTRUCTIONS
Vaccine Information Statement    Influenza (Flu) Vaccine (Inactivated or Recombinant): What you need to know    Many Vaccine Information Statements are available in Hebrew and other languages. See www.immunize.org/vis  Hojas de Información Sobre Vacunas están disponibles en Español y en muchos otros idiomas. Visite www.immunize.org/vis    1. Why get vaccinated? Influenza (flu) is a contagious disease that spreads around the [place] every year, usually between October and May. Flu is caused by influenza viruses, and is spread mainly by coughing, sneezing, and close contact. Anyone can get flu. Flu strikes suddenly and can last several days. Symptoms vary by age, but can include:   fever/chills   sore throat   muscle aches   fatigue   cough   headache    runny or stuffy nose    Flu can also lead to pneumonia and blood infections, and cause diarrhea and seizures in children. If you have a medical condition, such as heart or lung disease, flu can make it worse. Flu is more dangerous for some people. Infants and young children, people 72years of age and older, pregnant women, and people with certain health conditions or a weakened immune system are at greatest risk. Each year thousands of people in the [facility] die from flu, and many more are hospitalized. Flu vaccine can:   keep you from getting flu,   make flu less severe if you do get it, and   keep you from spreading flu to your family and other people. 2. Inactivated and recombinant flu vaccines    A dose of flu vaccine is recommended every flu season. Children 6 months through 6years of age may need two doses during the same flu season. Everyone else needs only one dose each flu season.        Some inactivated flu vaccines contain a very small amount of a mercury-based preservative called thimerosal. Studies have not shown thimerosal in vaccines to be harmful, but flu vaccines that do not contain thimerosal are available. There is no live flu virus in flu shots. They cannot cause the flu. There are many flu viruses, and they are always changing. Each year a new flu vaccine is made to protect against three or four viruses that are likely to cause disease in the upcoming flu season. But even when the vaccine doesnt exactly match these viruses, it may still provide some protection    Flu vaccine cannot prevent:   flu that is caused by a virus not covered by the vaccine, or   illnesses that look like flu but are not. It takes about 2 weeks for protection to develop after vaccination, and protection lasts through the flu season. 3. Some people should not get this vaccine    Tell the person who is giving you the vaccine:     If you have any severe, life-threatening allergies. If you ever had a life-threatening allergic reaction after a dose of flu vaccine, or have a severe allergy to any part of this vaccine, you may be advised not to get vaccinated. Most, but not all, types of flu vaccine contain a small amount of egg protein.  If you ever had Guillain-Barré Syndrome (also called GBS). Some people with a history of GBS should not get this vaccine. This should be discussed with your doctor.  If you are not feeling well. It is usually okay to get flu vaccine when you have a mild illness, but you might be asked to come back when you feel better. 4. Risks of a vaccine reaction    With any medicine, including vaccines, there is a chance of reactions. These are usually mild and go away on their own, but serious reactions are also possible. Most people who get a flu shot do not have any problems with it.      Minor problems following a flu shot include:    soreness, redness, or swelling where the shot was given     hoarseness   sore, red or itchy eyes   cough   fever   aches   headache   itching   fatigue  If these problems occur, they usually begin soon after the shot and last 1 or 2 days. More serious problems following a flu shot can include the following:     There may be a small increased risk of Guillain-Barré Syndrome (GBS) after inactivated flu vaccine. This risk has been estimated at 1 or 2 additional cases per million people vaccinated. This is much lower than the risk of severe complications from flu, which can be prevented by flu vaccine.  Young children who get the flu shot along with pneumococcal vaccine (PCV13) and/or DTaP vaccine at the same time might be slightly more likely to have a seizure caused by fever. Ask your doctor for more information. Tell your doctor if a child who is getting flu vaccine has ever had a seizure. Problems that could happen after any injected vaccine:      People sometimes faint after a medical procedure, including vaccination. Sitting or lying down for about 15 minutes can help prevent fainting, and injuries caused by a fall. Tell your doctor if you feel dizzy, or have vision changes or ringing in the ears.  Some people get severe pain in the shoulder and have difficulty moving the arm where a shot was given. This happens very rarely.  Any medication can cause a severe allergic reaction. Such reactions from a vaccine are very rare, estimated at about 1 in a million doses, and would happen within a few minutes to a few hours after the vaccination. As with any medicine, there is a very remote chance of a vaccine causing a serious injury or death. The safety of vaccines is always being monitored. For more information, visit: www.cdc.gov/vaccinesafety/    5. What if there is a serious reaction? What should I look for?  Look for anything that concerns you, such as signs of a severe allergic reaction, very high fever, or unusual behavior.     Signs of a severe allergic reaction can include hives, swelling of the face and throat, difficulty breathing, a fast heartbeat, dizziness, and weakness - usually within a few minutes to a few hours after the vaccination. What should I do?  If you think it is a severe allergic reaction or other emergency that cant wait, call 9-1-1 and get the person to the nearest hospital. Otherwise, call your doctor.  Reactions should be reported to the Vaccine Adverse Event Reporting System (VAERS). Your doctor should file this report, or you can do it yourself through  the VAERS web site at www.vaers. [facility].gov, or by calling 8-721.456.2567. VAERS does not give medical advice. 6. The National Vaccine Injury Compensation Program    The [facility] Vaccine Injury Compensation Program (VICP) is a federal program that was created to compensate people who may have been injured by certain vaccines. Persons who believe they may have been injured by a vaccine can learn about the program and about filing a claim by calling 1-615.214.3961 or visiting the [facility] website at www.[email or web address]/vaccinecompensation. There is a time limit to file a claim for compensation. 7. How can I learn more?  Ask your healthcare provider. He or she can give you the vaccine package insert or suggest other sources of information.  Call your local or state health department.  Contact the Centers for Disease Control and Prevention (CDC):  - Call 1-379.285.7735 (1-800-CDC-INFO) or  - Visit CDCs website at www.cdc.gov/flu    Vaccine Information Statement   Inactivated Influenza Vaccine   8/7/2015  42 JOSE Cortez [id number]    Department of Health and Human Services  Centers for Disease Control and Prevention    Office Use Only    Stop aspirin and NSAID therapy and all over-the-counter supplements for 1 week prior to surgery.

## 2018-10-01 LAB
ALBUMIN SERPL-MCNC: 4.2 G/DL (ref 3.5–5.5)
ALBUMIN/GLOB SERPL: 1.9 {RATIO} (ref 1.2–2.2)
ALP SERPL-CCNC: 92 IU/L (ref 39–117)
ALT SERPL-CCNC: 28 IU/L (ref 0–32)
APPEARANCE UR: CLEAR
AST SERPL-CCNC: 23 IU/L (ref 0–40)
BACTERIA #/AREA URNS HPF: NORMAL /[HPF]
BASOPHILS # BLD AUTO: 0.1 X10E3/UL (ref 0–0.2)
BASOPHILS NFR BLD AUTO: 1 %
BILIRUB SERPL-MCNC: 0.3 MG/DL (ref 0–1.2)
BILIRUB UR QL STRIP: NEGATIVE
BUN SERPL-MCNC: 13 MG/DL (ref 6–24)
BUN/CREAT SERPL: 21 (ref 9–23)
CALCIUM SERPL-MCNC: 9.7 MG/DL (ref 8.7–10.2)
CASTS URNS QL MICRO: NORMAL /LPF
CHLORIDE SERPL-SCNC: 97 MMOL/L (ref 96–106)
CHOLEST SERPL-MCNC: 118 MG/DL (ref 100–199)
CO2 SERPL-SCNC: 26 MMOL/L (ref 20–29)
COLOR UR: YELLOW
CREAT SERPL-MCNC: 0.62 MG/DL (ref 0.57–1)
EOSINOPHIL # BLD AUTO: 0.2 X10E3/UL (ref 0–0.4)
EOSINOPHIL NFR BLD AUTO: 3 %
EPI CELLS #/AREA URNS HPF: NORMAL /HPF
ERYTHROCYTE [DISTWIDTH] IN BLOOD BY AUTOMATED COUNT: 14.4 % (ref 12.3–15.4)
EST. AVERAGE GLUCOSE BLD GHB EST-MCNC: 192 MG/DL
GLOBULIN SER CALC-MCNC: 2.2 G/DL (ref 1.5–4.5)
GLUCOSE SERPL-MCNC: 127 MG/DL (ref 65–99)
GLUCOSE UR QL: NEGATIVE
HBA1C MFR BLD: 8.3 % (ref 4.8–5.6)
HCT VFR BLD AUTO: 36.4 % (ref 34–46.6)
HDLC SERPL-MCNC: 45 MG/DL
HGB BLD-MCNC: 12 G/DL (ref 11.1–15.9)
HGB UR QL STRIP: NEGATIVE
IMM GRANULOCYTES # BLD: 0 X10E3/UL (ref 0–0.1)
IMM GRANULOCYTES NFR BLD: 1 %
INR PPP: 1 (ref 0.8–1.2)
KETONES UR QL STRIP: NEGATIVE
LDLC SERPL CALC-MCNC: 42 MG/DL (ref 0–99)
LEUKOCYTE ESTERASE UR QL STRIP: NEGATIVE
LYMPHOCYTES # BLD AUTO: 2.3 X10E3/UL (ref 0.7–3.1)
LYMPHOCYTES NFR BLD AUTO: 34 %
MCH RBC QN AUTO: 28.6 PG (ref 26.6–33)
MCHC RBC AUTO-ENTMCNC: 33 G/DL (ref 31.5–35.7)
MCV RBC AUTO: 87 FL (ref 79–97)
MICRO URNS: NORMAL
MICRO URNS: NORMAL
MONOCYTES # BLD AUTO: 0.4 X10E3/UL (ref 0.1–0.9)
MONOCYTES NFR BLD AUTO: 5 %
NEUTROPHILS # BLD AUTO: 3.7 X10E3/UL (ref 1.4–7)
NEUTROPHILS NFR BLD AUTO: 56 %
NITRITE UR QL STRIP: NEGATIVE
PH UR STRIP: 6.5 [PH] (ref 5–7.5)
PLATELET # BLD AUTO: 379 X10E3/UL (ref 150–379)
POTASSIUM SERPL-SCNC: 4.5 MMOL/L (ref 3.5–5.2)
PROT SERPL-MCNC: 6.4 G/DL (ref 6–8.5)
PROT UR QL STRIP: NEGATIVE
PROTHROMBIN TIME: 10.3 SEC (ref 9.1–12)
RBC # BLD AUTO: 4.19 X10E6/UL (ref 3.77–5.28)
RBC #/AREA URNS HPF: NORMAL /HPF
SODIUM SERPL-SCNC: 138 MMOL/L (ref 134–144)
SP GR UR: 1.01 (ref 1–1.03)
TRIGL SERPL-MCNC: 156 MG/DL (ref 0–149)
URINALYSIS REFLEX, 377202: NORMAL
UROBILINOGEN UR STRIP-MCNC: 0.2 MG/DL (ref 0.2–1)
VLDLC SERPL CALC-MCNC: 31 MG/DL (ref 5–40)
WBC # BLD AUTO: 6.6 X10E3/UL (ref 3.4–10.8)
WBC #/AREA URNS HPF: NORMAL /HPF

## 2018-10-16 RX ORDER — FLUTICASONE PROPIONATE 50 MCG
1 SPRAY, SUSPENSION (ML) NASAL DAILY
COMMUNITY
End: 2019-01-08 | Stop reason: SDUPTHER

## 2018-10-17 ENCOUNTER — ANESTHESIA EVENT (OUTPATIENT)
Dept: SURGERY | Age: 58
End: 2018-10-17
Payer: COMMERCIAL

## 2018-10-18 ENCOUNTER — ANESTHESIA (OUTPATIENT)
Dept: SURGERY | Age: 58
End: 2018-10-18
Payer: COMMERCIAL

## 2018-10-18 ENCOUNTER — HOSPITAL ENCOUNTER (OUTPATIENT)
Age: 58
Setting detail: OBSERVATION
Discharge: HOME | End: 2018-10-19
Attending: ORTHOPAEDIC SURGERY | Admitting: ORTHOPAEDIC SURGERY
Payer: COMMERCIAL

## 2018-10-18 ENCOUNTER — APPOINTMENT (OUTPATIENT)
Dept: GENERAL RADIOLOGY | Age: 58
End: 2018-10-18
Attending: ORTHOPAEDIC SURGERY
Payer: COMMERCIAL

## 2018-10-18 DIAGNOSIS — M48.02 CERVICAL STENOSIS OF SPINAL CANAL: Primary | ICD-10-CM

## 2018-10-18 LAB
ABO + RH BLD: NORMAL
BLOOD GROUP ANTIBODIES SERPL: NORMAL
GLUCOSE BLD STRIP.AUTO-MCNC: 269 MG/DL (ref 65–100)
GLUCOSE BLD STRIP.AUTO-MCNC: 293 MG/DL (ref 65–100)
GLUCOSE BLD STRIP.AUTO-MCNC: 295 MG/DL (ref 65–100)
GLUCOSE BLD STRIP.AUTO-MCNC: 308 MG/DL (ref 65–100)
SERVICE CMNT-IMP: ABNORMAL
SPECIMEN EXP DATE BLD: NORMAL

## 2018-10-18 PROCEDURE — C1713 ANCHOR/SCREW BN/BN,TIS/BN: HCPCS | Performed by: ORTHOPAEDIC SURGERY

## 2018-10-18 PROCEDURE — 77030018836 HC SOL IRR NACL ICUM -A: Performed by: ORTHOPAEDIC SURGERY

## 2018-10-18 PROCEDURE — 74011636637 HC RX REV CODE- 636/637: Performed by: PHYSICIAN ASSISTANT

## 2018-10-18 PROCEDURE — 77030013567 HC DRN WND RESERV BARD -A: Performed by: ORTHOPAEDIC SURGERY

## 2018-10-18 PROCEDURE — 74011000272 HC RX REV CODE- 272: Performed by: ORTHOPAEDIC SURGERY

## 2018-10-18 PROCEDURE — 77030011267 HC ELECTRD BLD COVD -A: Performed by: ORTHOPAEDIC SURGERY

## 2018-10-18 PROCEDURE — 77030012406 HC DRN WND PENRS BARD -A: Performed by: ORTHOPAEDIC SURGERY

## 2018-10-18 PROCEDURE — 77030013079 HC BLNKT BAIR HGGR 3M -A: Performed by: ANESTHESIOLOGY

## 2018-10-18 PROCEDURE — 77030032490 HC SLV COMPR SCD KNE COVD -B: Performed by: ORTHOPAEDIC SURGERY

## 2018-10-18 PROCEDURE — 72020 X-RAY EXAM OF SPINE 1 VIEW: CPT

## 2018-10-18 PROCEDURE — 77030010514 HC APPL CLP LIG COVD -B: Performed by: ORTHOPAEDIC SURGERY

## 2018-10-18 PROCEDURE — 74011636637 HC RX REV CODE- 636/637

## 2018-10-18 PROCEDURE — 99218 HC RM OBSERVATION: CPT

## 2018-10-18 PROCEDURE — 76060000034 HC ANESTHESIA 1.5 TO 2 HR: Performed by: ORTHOPAEDIC SURGERY

## 2018-10-18 PROCEDURE — 77030038600 HC TU BPLR IRR DISP STRY -B: Performed by: ORTHOPAEDIC SURGERY

## 2018-10-18 PROCEDURE — 77030014007 HC SPNG HEMSTAT J&J -B: Performed by: ORTHOPAEDIC SURGERY

## 2018-10-18 PROCEDURE — 74011250636 HC RX REV CODE- 250/636

## 2018-10-18 PROCEDURE — 86900 BLOOD TYPING SEROLOGIC ABO: CPT | Performed by: ORTHOPAEDIC SURGERY

## 2018-10-18 PROCEDURE — 74011636637 HC RX REV CODE- 636/637: Performed by: ORTHOPAEDIC SURGERY

## 2018-10-18 PROCEDURE — 74011250636 HC RX REV CODE- 250/636: Performed by: PHYSICIAN ASSISTANT

## 2018-10-18 PROCEDURE — 74011250637 HC RX REV CODE- 250/637: Performed by: PHYSICIAN ASSISTANT

## 2018-10-18 PROCEDURE — 77030003666 HC NDL SPINAL BD -A: Performed by: ORTHOPAEDIC SURGERY

## 2018-10-18 PROCEDURE — 74011000250 HC RX REV CODE- 250: Performed by: ORTHOPAEDIC SURGERY

## 2018-10-18 PROCEDURE — 36415 COLL VENOUS BLD VENIPUNCTURE: CPT | Performed by: ORTHOPAEDIC SURGERY

## 2018-10-18 PROCEDURE — 74011250636 HC RX REV CODE- 250/636: Performed by: ANESTHESIOLOGY

## 2018-10-18 PROCEDURE — 76010000162 HC OR TIME 1.5 TO 2 HR INTENSV-TIER 1: Performed by: ORTHOPAEDIC SURGERY

## 2018-10-18 PROCEDURE — 82962 GLUCOSE BLOOD TEST: CPT

## 2018-10-18 PROCEDURE — 77030012464: Performed by: ORTHOPAEDIC SURGERY

## 2018-10-18 PROCEDURE — 74011000250 HC RX REV CODE- 250

## 2018-10-18 PROCEDURE — 77030038020 HC MANFLD NEPTUNE STRY -B: Performed by: ORTHOPAEDIC SURGERY

## 2018-10-18 PROCEDURE — 77030020782 HC GWN BAIR PAWS FLX 3M -B

## 2018-10-18 PROCEDURE — 77030019908 HC STETH ESOPH SIMS -A: Performed by: ANESTHESIOLOGY

## 2018-10-18 PROCEDURE — 74011250636 HC RX REV CODE- 250/636: Performed by: ORTHOPAEDIC SURGERY

## 2018-10-18 PROCEDURE — 76210000016 HC OR PH I REC 1 TO 1.5 HR: Performed by: ORTHOPAEDIC SURGERY

## 2018-10-18 PROCEDURE — 77030026438 HC STYL ET INTUB CARD -A: Performed by: ANESTHESIOLOGY

## 2018-10-18 PROCEDURE — 77030031139 HC SUT VCRL2 J&J -A: Performed by: ORTHOPAEDIC SURGERY

## 2018-10-18 PROCEDURE — 77030008684 HC TU ET CUF COVD -B: Performed by: ANESTHESIOLOGY

## 2018-10-18 PROCEDURE — 77030029099 HC BN WAX SSPC -A: Performed by: ORTHOPAEDIC SURGERY

## 2018-10-18 PROCEDURE — 77030004391 HC BUR FLUT MEDT -C: Performed by: ORTHOPAEDIC SURGERY

## 2018-10-18 PROCEDURE — 77030012893

## 2018-10-18 PROCEDURE — 77030022965 HC BIT DRL W/STP GLBM -B: Performed by: ORTHOPAEDIC SURGERY

## 2018-10-18 DEVICE — 4.0MM RIGID SCREW, SELF-TAPPING, 12MM
Type: IMPLANTABLE DEVICE | Site: SPINE CERVICAL | Status: FUNCTIONAL
Brand: ASSURE

## 2018-10-18 DEVICE — BONE SPCR CERV LORDC 6X8MM --: Type: IMPLANTABLE DEVICE | Site: SPINE CERVICAL | Status: FUNCTIONAL

## 2018-10-18 DEVICE — ASSURE CERVICAL PLATE 1-LEVEL, 16MM
Type: IMPLANTABLE DEVICE | Site: SPINE CERVICAL | Status: FUNCTIONAL
Brand: ASSURE

## 2018-10-18 DEVICE — SET SCREW FOR RIGID SCREWS
Type: IMPLANTABLE DEVICE | Site: SPINE CERVICAL | Status: FUNCTIONAL
Brand: ASSURE

## 2018-10-18 RX ORDER — MELATONIN
2000 DAILY
Status: DISCONTINUED | OUTPATIENT
Start: 2018-10-19 | End: 2018-10-19 | Stop reason: HOSPADM

## 2018-10-18 RX ORDER — FENTANYL CITRATE 50 UG/ML
50 INJECTION, SOLUTION INTRAMUSCULAR; INTRAVENOUS AS NEEDED
Status: DISCONTINUED | OUTPATIENT
Start: 2018-10-18 | End: 2018-10-18 | Stop reason: HOSPADM

## 2018-10-18 RX ORDER — METHYLPREDNISOLONE ACETATE 40 MG/ML
INJECTION, SUSPENSION INTRA-ARTICULAR; INTRALESIONAL; INTRAMUSCULAR; SOFT TISSUE AS NEEDED
Status: DISCONTINUED | OUTPATIENT
Start: 2018-10-18 | End: 2018-10-18 | Stop reason: HOSPADM

## 2018-10-18 RX ORDER — INSULIN LISPRO 100 [IU]/ML
INJECTION, SOLUTION INTRAVENOUS; SUBCUTANEOUS
Status: DISCONTINUED | OUTPATIENT
Start: 2018-10-18 | End: 2018-10-18

## 2018-10-18 RX ORDER — HYDROMORPHONE HCL/0.9% NACL/PF 0.5 MG/ML
PLASTIC BAG, INJECTION (ML) INTRAVENOUS
Status: COMPLETED
Start: 2018-10-18 | End: 2018-10-18

## 2018-10-18 RX ORDER — FLUTICASONE PROPIONATE 50 MCG
1 SPRAY, SUSPENSION (ML) NASAL DAILY
Status: DISCONTINUED | OUTPATIENT
Start: 2018-10-19 | End: 2018-10-19 | Stop reason: HOSPADM

## 2018-10-18 RX ORDER — AMLODIPINE BESYLATE 5 MG/1
10 TABLET ORAL DAILY
Status: DISCONTINUED | OUTPATIENT
Start: 2018-10-19 | End: 2018-10-19 | Stop reason: HOSPADM

## 2018-10-18 RX ORDER — FENTANYL CITRATE 50 UG/ML
INJECTION, SOLUTION INTRAMUSCULAR; INTRAVENOUS AS NEEDED
Status: DISCONTINUED | OUTPATIENT
Start: 2018-10-18 | End: 2018-10-18 | Stop reason: HOSPADM

## 2018-10-18 RX ORDER — DIPHENHYDRAMINE HYDROCHLORIDE 50 MG/ML
12.5 INJECTION, SOLUTION INTRAMUSCULAR; INTRAVENOUS AS NEEDED
Status: DISCONTINUED | OUTPATIENT
Start: 2018-10-18 | End: 2018-10-18 | Stop reason: HOSPADM

## 2018-10-18 RX ORDER — SODIUM CHLORIDE 9 MG/ML
125 INJECTION, SOLUTION INTRAVENOUS CONTINUOUS
Status: DISPENSED | OUTPATIENT
Start: 2018-10-18 | End: 2018-10-19

## 2018-10-18 RX ORDER — SODIUM CHLORIDE 0.9 % (FLUSH) 0.9 %
5-10 SYRINGE (ML) INJECTION EVERY 8 HOURS
Status: DISCONTINUED | OUTPATIENT
Start: 2018-10-18 | End: 2018-10-18 | Stop reason: HOSPADM

## 2018-10-18 RX ORDER — ONDANSETRON 2 MG/ML
4 INJECTION INTRAMUSCULAR; INTRAVENOUS AS NEEDED
Status: DISCONTINUED | OUTPATIENT
Start: 2018-10-18 | End: 2018-10-18 | Stop reason: HOSPADM

## 2018-10-18 RX ORDER — NEOSTIGMINE METHYLSULFATE 1 MG/ML
INJECTION INTRAVENOUS AS NEEDED
Status: DISCONTINUED | OUTPATIENT
Start: 2018-10-18 | End: 2018-10-18 | Stop reason: HOSPADM

## 2018-10-18 RX ORDER — DEXTROSE 50 % IN WATER (D50W) INTRAVENOUS SYRINGE
12.5-25 AS NEEDED
Status: DISCONTINUED | OUTPATIENT
Start: 2018-10-18 | End: 2018-10-19 | Stop reason: HOSPADM

## 2018-10-18 RX ORDER — LIDOCAINE HYDROCHLORIDE 20 MG/ML
INJECTION, SOLUTION EPIDURAL; INFILTRATION; INTRACAUDAL; PERINEURAL AS NEEDED
Status: DISCONTINUED | OUTPATIENT
Start: 2018-10-18 | End: 2018-10-18 | Stop reason: HOSPADM

## 2018-10-18 RX ORDER — HYDROCHLOROTHIAZIDE 25 MG/1
25 TABLET ORAL DAILY
Status: DISCONTINUED | OUTPATIENT
Start: 2018-10-19 | End: 2018-10-19 | Stop reason: HOSPADM

## 2018-10-18 RX ORDER — MORPHINE SULFATE 10 MG/ML
2 INJECTION, SOLUTION INTRAMUSCULAR; INTRAVENOUS
Status: DISCONTINUED | OUTPATIENT
Start: 2018-10-18 | End: 2018-10-18 | Stop reason: HOSPADM

## 2018-10-18 RX ORDER — SODIUM CHLORIDE, SODIUM LACTATE, POTASSIUM CHLORIDE, CALCIUM CHLORIDE 600; 310; 30; 20 MG/100ML; MG/100ML; MG/100ML; MG/100ML
75 INJECTION, SOLUTION INTRAVENOUS CONTINUOUS
Status: DISCONTINUED | OUTPATIENT
Start: 2018-10-18 | End: 2018-10-18 | Stop reason: HOSPADM

## 2018-10-18 RX ORDER — LOSARTAN POTASSIUM 50 MG/1
100 TABLET ORAL DAILY
Status: DISCONTINUED | OUTPATIENT
Start: 2018-10-19 | End: 2018-10-19 | Stop reason: HOSPADM

## 2018-10-18 RX ORDER — INSULIN GLARGINE 100 [IU]/ML
35 INJECTION, SOLUTION SUBCUTANEOUS DAILY
Status: DISCONTINUED | OUTPATIENT
Start: 2018-10-18 | End: 2018-10-19 | Stop reason: HOSPADM

## 2018-10-18 RX ORDER — DIPHENHYDRAMINE HYDROCHLORIDE 50 MG/ML
12.5 INJECTION, SOLUTION INTRAMUSCULAR; INTRAVENOUS
Status: DISPENSED | OUTPATIENT
Start: 2018-10-18 | End: 2018-10-19

## 2018-10-18 RX ORDER — FACIAL-BODY WIPES
10 EACH TOPICAL DAILY PRN
Status: DISCONTINUED | OUTPATIENT
Start: 2018-10-20 | End: 2018-10-19 | Stop reason: HOSPADM

## 2018-10-18 RX ORDER — OXYCODONE HYDROCHLORIDE 5 MG/1
5 TABLET ORAL
Status: DISCONTINUED | OUTPATIENT
Start: 2018-10-18 | End: 2018-10-19 | Stop reason: HOSPADM

## 2018-10-18 RX ORDER — GLYCOPYRROLATE 0.2 MG/ML
INJECTION INTRAMUSCULAR; INTRAVENOUS AS NEEDED
Status: DISCONTINUED | OUTPATIENT
Start: 2018-10-18 | End: 2018-10-18 | Stop reason: HOSPADM

## 2018-10-18 RX ORDER — SODIUM CHLORIDE 0.9 % (FLUSH) 0.9 %
5-10 SYRINGE (ML) INJECTION AS NEEDED
Status: DISCONTINUED | OUTPATIENT
Start: 2018-10-18 | End: 2018-10-19 | Stop reason: HOSPADM

## 2018-10-18 RX ORDER — MIDAZOLAM HYDROCHLORIDE 1 MG/ML
0.5 INJECTION, SOLUTION INTRAMUSCULAR; INTRAVENOUS
Status: DISCONTINUED | OUTPATIENT
Start: 2018-10-18 | End: 2018-10-18 | Stop reason: HOSPADM

## 2018-10-18 RX ORDER — CEFAZOLIN SODIUM/WATER 2 G/20 ML
2 SYRINGE (ML) INTRAVENOUS ONCE
Status: COMPLETED | OUTPATIENT
Start: 2018-10-18 | End: 2018-10-18

## 2018-10-18 RX ORDER — MIDAZOLAM HYDROCHLORIDE 1 MG/ML
INJECTION, SOLUTION INTRAMUSCULAR; INTRAVENOUS AS NEEDED
Status: DISCONTINUED | OUTPATIENT
Start: 2018-10-18 | End: 2018-10-18 | Stop reason: HOSPADM

## 2018-10-18 RX ORDER — ONDANSETRON 2 MG/ML
INJECTION INTRAMUSCULAR; INTRAVENOUS AS NEEDED
Status: DISCONTINUED | OUTPATIENT
Start: 2018-10-18 | End: 2018-10-18 | Stop reason: HOSPADM

## 2018-10-18 RX ORDER — SODIUM CHLORIDE 0.9 % (FLUSH) 0.9 %
5-10 SYRINGE (ML) INJECTION EVERY 8 HOURS
Status: DISCONTINUED | OUTPATIENT
Start: 2018-10-19 | End: 2018-10-19 | Stop reason: HOSPADM

## 2018-10-18 RX ORDER — MIDAZOLAM HYDROCHLORIDE 1 MG/ML
1 INJECTION, SOLUTION INTRAMUSCULAR; INTRAVENOUS AS NEEDED
Status: DISCONTINUED | OUTPATIENT
Start: 2018-10-18 | End: 2018-10-18 | Stop reason: HOSPADM

## 2018-10-18 RX ORDER — OXYCODONE HYDROCHLORIDE 5 MG/1
10 TABLET ORAL
Status: DISCONTINUED | OUTPATIENT
Start: 2018-10-18 | End: 2018-10-19 | Stop reason: HOSPADM

## 2018-10-18 RX ORDER — ROCURONIUM BROMIDE 10 MG/ML
INJECTION, SOLUTION INTRAVENOUS AS NEEDED
Status: DISCONTINUED | OUTPATIENT
Start: 2018-10-18 | End: 2018-10-18 | Stop reason: HOSPADM

## 2018-10-18 RX ORDER — DEXAMETHASONE SODIUM PHOSPHATE 4 MG/ML
4 INJECTION, SOLUTION INTRA-ARTICULAR; INTRALESIONAL; INTRAMUSCULAR; INTRAVENOUS; SOFT TISSUE
Status: DISCONTINUED | OUTPATIENT
Start: 2018-10-18 | End: 2018-10-19 | Stop reason: HOSPADM

## 2018-10-18 RX ORDER — ONDANSETRON 2 MG/ML
4 INJECTION INTRAMUSCULAR; INTRAVENOUS
Status: ACTIVE | OUTPATIENT
Start: 2018-10-18 | End: 2018-10-19

## 2018-10-18 RX ORDER — FENTANYL CITRATE 50 UG/ML
25 INJECTION, SOLUTION INTRAMUSCULAR; INTRAVENOUS
Status: DISCONTINUED | OUTPATIENT
Start: 2018-10-18 | End: 2018-10-18 | Stop reason: HOSPADM

## 2018-10-18 RX ORDER — SODIUM CHLORIDE, SODIUM LACTATE, POTASSIUM CHLORIDE, CALCIUM CHLORIDE 600; 310; 30; 20 MG/100ML; MG/100ML; MG/100ML; MG/100ML
125 INJECTION, SOLUTION INTRAVENOUS CONTINUOUS
Status: DISCONTINUED | OUTPATIENT
Start: 2018-10-18 | End: 2018-10-18 | Stop reason: HOSPADM

## 2018-10-18 RX ORDER — DEXTROSE 50 % IN WATER (D50W) INTRAVENOUS SYRINGE
25-50 AS NEEDED
Status: DISCONTINUED | OUTPATIENT
Start: 2018-10-18 | End: 2018-10-19 | Stop reason: HOSPADM

## 2018-10-18 RX ORDER — SODIUM CHLORIDE 0.9 % (FLUSH) 0.9 %
5-10 SYRINGE (ML) INJECTION AS NEEDED
Status: DISCONTINUED | OUTPATIENT
Start: 2018-10-18 | End: 2018-10-18 | Stop reason: HOSPADM

## 2018-10-18 RX ORDER — SODIUM CHLORIDE 9 MG/ML
25 INJECTION, SOLUTION INTRAVENOUS CONTINUOUS
Status: DISCONTINUED | OUTPATIENT
Start: 2018-10-18 | End: 2018-10-18 | Stop reason: HOSPADM

## 2018-10-18 RX ORDER — ACETAMINOPHEN 325 MG/1
650 TABLET ORAL
Status: DISCONTINUED | OUTPATIENT
Start: 2018-10-18 | End: 2018-10-19 | Stop reason: HOSPADM

## 2018-10-18 RX ORDER — SUCCINYLCHOLINE CHLORIDE 20 MG/ML
INJECTION INTRAMUSCULAR; INTRAVENOUS AS NEEDED
Status: DISCONTINUED | OUTPATIENT
Start: 2018-10-18 | End: 2018-10-18 | Stop reason: HOSPADM

## 2018-10-18 RX ORDER — FLUOXETINE HYDROCHLORIDE 20 MG/1
60 CAPSULE ORAL DAILY
Status: DISCONTINUED | OUTPATIENT
Start: 2018-10-19 | End: 2018-10-19 | Stop reason: HOSPADM

## 2018-10-18 RX ORDER — CEFAZOLIN SODIUM/WATER 2 G/20 ML
2 SYRINGE (ML) INTRAVENOUS EVERY 8 HOURS
Status: COMPLETED | OUTPATIENT
Start: 2018-10-18 | End: 2018-10-18

## 2018-10-18 RX ORDER — METFORMIN HYDROCHLORIDE 500 MG/1
1000 TABLET, EXTENDED RELEASE ORAL 2 TIMES DAILY
Status: DISCONTINUED | OUTPATIENT
Start: 2018-10-18 | End: 2018-10-19 | Stop reason: HOSPADM

## 2018-10-18 RX ORDER — ROPIVACAINE HYDROCHLORIDE 5 MG/ML
30 INJECTION, SOLUTION EPIDURAL; INFILTRATION; PERINEURAL ONCE
Status: DISCONTINUED | OUTPATIENT
Start: 2018-10-18 | End: 2018-10-18 | Stop reason: HOSPADM

## 2018-10-18 RX ORDER — HYDROMORPHONE HCL/0.9% NACL/PF 0.5 MG/ML
PLASTIC BAG, INJECTION (ML) INTRAVENOUS CONTINUOUS
Status: DISCONTINUED | OUTPATIENT
Start: 2018-10-18 | End: 2018-10-19

## 2018-10-18 RX ORDER — NALOXONE HYDROCHLORIDE 0.4 MG/ML
0.4 INJECTION, SOLUTION INTRAMUSCULAR; INTRAVENOUS; SUBCUTANEOUS AS NEEDED
Status: DISCONTINUED | OUTPATIENT
Start: 2018-10-18 | End: 2018-10-19 | Stop reason: HOSPADM

## 2018-10-18 RX ORDER — POLYETHYLENE GLYCOL 3350 17 G/17G
17 POWDER, FOR SOLUTION ORAL DAILY
Status: DISCONTINUED | OUTPATIENT
Start: 2018-10-19 | End: 2018-10-19 | Stop reason: HOSPADM

## 2018-10-18 RX ORDER — ROSUVASTATIN CALCIUM 10 MG/1
20 TABLET, COATED ORAL
Status: DISCONTINUED | OUTPATIENT
Start: 2018-10-18 | End: 2018-10-19 | Stop reason: HOSPADM

## 2018-10-18 RX ORDER — DIAZEPAM 5 MG/1
5 TABLET ORAL
Status: DISCONTINUED | OUTPATIENT
Start: 2018-10-18 | End: 2018-10-19 | Stop reason: HOSPADM

## 2018-10-18 RX ORDER — INSULIN LISPRO 100 [IU]/ML
INJECTION, SOLUTION INTRAVENOUS; SUBCUTANEOUS
Status: DISCONTINUED | OUTPATIENT
Start: 2018-10-18 | End: 2018-10-19 | Stop reason: HOSPADM

## 2018-10-18 RX ORDER — CETIRIZINE HCL 10 MG
10 TABLET ORAL DAILY
Status: DISCONTINUED | OUTPATIENT
Start: 2018-10-19 | End: 2018-10-19 | Stop reason: HOSPADM

## 2018-10-18 RX ORDER — AMOXICILLIN 250 MG
1 CAPSULE ORAL 2 TIMES DAILY
Status: DISCONTINUED | OUTPATIENT
Start: 2018-10-18 | End: 2018-10-19 | Stop reason: HOSPADM

## 2018-10-18 RX ORDER — HYDROMORPHONE HYDROCHLORIDE 2 MG/ML
INJECTION, SOLUTION INTRAMUSCULAR; INTRAVENOUS; SUBCUTANEOUS AS NEEDED
Status: DISCONTINUED | OUTPATIENT
Start: 2018-10-18 | End: 2018-10-18 | Stop reason: HOSPADM

## 2018-10-18 RX ORDER — LIDOCAINE HYDROCHLORIDE 10 MG/ML
0.1 INJECTION, SOLUTION EPIDURAL; INFILTRATION; INTRACAUDAL; PERINEURAL AS NEEDED
Status: DISCONTINUED | OUTPATIENT
Start: 2018-10-18 | End: 2018-10-18 | Stop reason: HOSPADM

## 2018-10-18 RX ORDER — PROPOFOL 10 MG/ML
INJECTION, EMULSION INTRAVENOUS AS NEEDED
Status: DISCONTINUED | OUTPATIENT
Start: 2018-10-18 | End: 2018-10-18 | Stop reason: HOSPADM

## 2018-10-18 RX ORDER — MAGNESIUM SULFATE 100 %
4 CRYSTALS MISCELLANEOUS AS NEEDED
Status: DISCONTINUED | OUTPATIENT
Start: 2018-10-18 | End: 2018-10-19 | Stop reason: HOSPADM

## 2018-10-18 RX ORDER — SODIUM CHLORIDE 0.9 % (FLUSH) 0.9 %
5-10 SYRINGE (ML) INJECTION EVERY 8 HOURS
Status: DISCONTINUED | OUTPATIENT
Start: 2018-10-18 | End: 2018-10-19 | Stop reason: HOSPADM

## 2018-10-18 RX ADMIN — METFORMIN HYDROCHLORIDE 1000 MG: 500 TABLET, EXTENDED RELEASE ORAL at 17:24

## 2018-10-18 RX ADMIN — HYDROMORPHONE HYDROCHLORIDE 0.5 MG: 2 INJECTION, SOLUTION INTRAMUSCULAR; INTRAVENOUS; SUBCUTANEOUS at 08:28

## 2018-10-18 RX ADMIN — DIPHENHYDRAMINE HYDROCHLORIDE 12.5 MG: 50 INJECTION INTRAMUSCULAR; INTRAVENOUS at 17:23

## 2018-10-18 RX ADMIN — ROCURONIUM BROMIDE 5 MG: 10 INJECTION, SOLUTION INTRAVENOUS at 08:03

## 2018-10-18 RX ADMIN — Medication 2 G: at 15:06

## 2018-10-18 RX ADMIN — SUCCINYLCHOLINE CHLORIDE 200 MG: 20 INJECTION INTRAMUSCULAR; INTRAVENOUS at 08:03

## 2018-10-18 RX ADMIN — SENNOSIDES AND DOCUSATE SODIUM 1 TABLET: 8.6; 5 TABLET ORAL at 17:24

## 2018-10-18 RX ADMIN — FENTANYL CITRATE 25 MCG: 50 INJECTION INTRAMUSCULAR; INTRAVENOUS at 10:12

## 2018-10-18 RX ADMIN — ACETAMINOPHEN 650 MG: 325 TABLET, FILM COATED ORAL at 15:06

## 2018-10-18 RX ADMIN — PROPOFOL 180 MG: 10 INJECTION, EMULSION INTRAVENOUS at 08:03

## 2018-10-18 RX ADMIN — SODIUM CHLORIDE 125 ML/HR: 900 INJECTION, SOLUTION INTRAVENOUS at 21:41

## 2018-10-18 RX ADMIN — Medication 2 G: at 08:16

## 2018-10-18 RX ADMIN — FENTANYL CITRATE 25 MCG: 50 INJECTION INTRAMUSCULAR; INTRAVENOUS at 10:18

## 2018-10-18 RX ADMIN — INSULIN LISPRO 10 UNITS: 100 INJECTION, SOLUTION INTRAVENOUS; SUBCUTANEOUS at 17:24

## 2018-10-18 RX ADMIN — SODIUM CHLORIDE 125 ML/HR: 900 INJECTION, SOLUTION INTRAVENOUS at 10:35

## 2018-10-18 RX ADMIN — HYDROMORPHONE HYDROCHLORIDE 1 MG: 2 INJECTION, SOLUTION INTRAMUSCULAR; INTRAVENOUS; SUBCUTANEOUS at 09:52

## 2018-10-18 RX ADMIN — FENTANYL CITRATE 100 MCG: 50 INJECTION, SOLUTION INTRAMUSCULAR; INTRAVENOUS at 08:28

## 2018-10-18 RX ADMIN — GLYCOPYRROLATE 0.4 MG: 0.2 INJECTION INTRAMUSCULAR; INTRAVENOUS at 09:28

## 2018-10-18 RX ADMIN — Medication 2 G: at 23:39

## 2018-10-18 RX ADMIN — FENTANYL CITRATE 50 MCG: 50 INJECTION, SOLUTION INTRAMUSCULAR; INTRAVENOUS at 08:03

## 2018-10-18 RX ADMIN — SODIUM CHLORIDE, SODIUM LACTATE, POTASSIUM CHLORIDE, AND CALCIUM CHLORIDE 125 ML/HR: 600; 310; 30; 20 INJECTION, SOLUTION INTRAVENOUS at 07:07

## 2018-10-18 RX ADMIN — FENTANYL CITRATE 50 MCG: 50 INJECTION, SOLUTION INTRAMUSCULAR; INTRAVENOUS at 07:56

## 2018-10-18 RX ADMIN — ONDANSETRON 4 MG: 2 INJECTION INTRAMUSCULAR; INTRAVENOUS at 09:29

## 2018-10-18 RX ADMIN — HYDROMORPHONE HYDROCHLORIDE 0.5 MG: 2 INJECTION, SOLUTION INTRAMUSCULAR; INTRAVENOUS; SUBCUTANEOUS at 08:49

## 2018-10-18 RX ADMIN — INSULIN GLARGINE 35 UNITS: 100 INJECTION, SOLUTION SUBCUTANEOUS at 17:24

## 2018-10-18 RX ADMIN — ROCURONIUM BROMIDE 20 MG: 10 INJECTION, SOLUTION INTRAVENOUS at 08:28

## 2018-10-18 RX ADMIN — Medication: at 10:30

## 2018-10-18 RX ADMIN — ROSUVASTATIN CALCIUM 20 MG: 10 TABLET, FILM COATED ORAL at 21:41

## 2018-10-18 RX ADMIN — MIDAZOLAM HYDROCHLORIDE 2 MG: 1 INJECTION, SOLUTION INTRAMUSCULAR; INTRAVENOUS at 07:56

## 2018-10-18 RX ADMIN — LIDOCAINE HYDROCHLORIDE 60 MG: 20 INJECTION, SOLUTION EPIDURAL; INFILTRATION; INTRACAUDAL; PERINEURAL at 08:03

## 2018-10-18 RX ADMIN — Medication 10 ML: at 21:42

## 2018-10-18 RX ADMIN — NEOSTIGMINE METHYLSULFATE 3 MG: 1 INJECTION INTRAVENOUS at 09:28

## 2018-10-18 RX ADMIN — ROCURONIUM BROMIDE 25 MG: 10 INJECTION, SOLUTION INTRAVENOUS at 08:10

## 2018-10-18 RX ADMIN — INSULIN LISPRO 3 UNITS: 100 INJECTION, SOLUTION INTRAVENOUS; SUBCUTANEOUS at 10:27

## 2018-10-18 RX ADMIN — INSULIN LISPRO 4 UNITS: 100 INJECTION, SOLUTION INTRAVENOUS; SUBCUTANEOUS at 21:41

## 2018-10-18 NOTE — BRIEF OP NOTE
BRIEF OPERATIVE NOTE    Date of Procedure: 10/18/2018   Preoperative Diagnosis: CERVICAL HNP C6-7  Postoperative Diagnosis: CERVICAL HNP C6-7    Procedure(s):  ANTERIOR CERVICAL FUSION C6-7  Surgeon(s) and Role:     * Chelsea Daniel MD - Primary         Surgical Assistant:Harish العراقي PA-C    Surgical Staff:  Circ-1: Maikol Jerry RN  Circ-Relief: Jovana Zhao RN  Physician Assistant: Israel Angelucci, PA-C  Scrub Tech-1: Juliann Lew  Event Time In Time Out   Incision Start 1059    Incision Close 0935      Anesthesia: General   Estimated Blood Loss: 75 ml  Specimens: * No specimens in log *   Findings: CERVICAL HNP Y2-8   Complications: NONE  Implants:   Implant Name Type Inv.  Item Serial No.  Lot No. LRB No. Used Action   BONE SPCR CERV LORDC 6X8MM --  - [id number] Bone BONE SPCR CERV LORDC 6X8MM --  [id number] [facility] TISSUE [facility] N/A N/A 1 Implanted   PLATE SPNE CERV 1L ASSURE 16MM --  - SN/A  PLATE SPNE CERV 1L ASSURE 16MM --  N/A [employer] N/A N/A 1 Implanted   SCR SPNE RIDIG ST ASSURE 4X12 --  - SN/A  SCR SPNE RIDIG ST ASSURE 4X12 --  N/A [employer] N/A N/A 4 Implanted   SCR SET RIGID ASSURE --  - SN/A  SCR SET RIGID ASSURE --  N/A [employer] N/A N/A 4 Implanted

## 2018-10-18 NOTE — OP NOTES
[address]   OPERATIVE REPORT    Pema Marie  MR#: [id number]  : 1960  ACCOUNT #: [de-identified]   DATE OF SERVICE: 10/18/2018    PREOPERATIVE DIAGNOSES:    1. Cervical disk herniation, C6-C7, left-sided radiculopathy. 2.  Morbid obesity. POSTOPERATIVE DIAGNOSES:  1. Cervical disk herniation, C6-C7, left-sided radiculopathy. 2.  Morbid obesity. PROCEDURE PERFORMED:  Anterior cervical diskectomy and fusion, C6-C7, using structural allograft (VG2 supplemented with Globus anterior cervical plating). SURGEON:  Jannie Gauthier MD    ASSISTANT:  AURORA Montgomery    ANESTHESIA:  General.    ESTIMATED BLOOD LOSS:  Documented on Brief Op Note    SPECIMENS REMOVED: None    COMPLICATIONS:None    IMPLANTS:  Documented on Brief Op Note    INDICATIONS:  The patient is a 59-year-old female who presented with severe complaints of discomfort involving the left upper extremity. Fairly significant degrees of weakness in the triceps and findings on the base of MR scan suggesting a foraminal type of disk herniation. Given all the findings clinically and radiologically, an anterior decompression and fusion are offered. Potential benefits and complications reviewed. DESCRIPTION OF PROCEDURE:  The patient was brought to the operating room in the supine position. General anesthetic administered. Anterior cervical region was then prepped and draped in normal fashion. Preoperative antibiotics administered. Thigh high CORTEZ hose and sequential pumps applied to the patient's lower extremity. A transverse incision was made then started in the midline extending to the border of the sternocleidomastoid muscle. Sharp dissection carried down to the level of the platysma. The platysma then split in line with its fibers exposed in deep cervical fascia.   Deep cervical fascia along the medial border of the sternomastoid muscle and blunt finger dissection carried down to the anterior aspect of the spine. X-ray taken to verify the level. The longus colli muscle elevated. Retractor blades placed. Walbridge pins used for the purposes of distraction. The annulus was then incised with a 15 blade. Then, using a series of curettes, pituitary rongeurs, evacuating the entire disk inclusive of the cartilaginous endplates. Osteophytes both anteriorly and posteriorly were resected. I eventually got into the epidural space using a 5-0 angled curette and created a wide foraminotomy over the C7 nerve root on the left side following it for some distance. The nerve at the end of the decompression seemed to be quite free. No evidence of further extrinsic nerve root compression. The wound was irrigated. The endplates were prepared for the purposes of graft implantation. A bony rasp used across the endplates. A 6 x 8 mm graft chosen and placed without difficulty. A 16 mm plate then secured to the anterior surface of the spine using a series of 4 screws measuring 4 mm in diameter and 12 mm in length with reasonable purchase. These were locked to the plate using the locking screws. Wounds were irrigated. Good hemostasis was obtained. DOUG drain was placed. Platysma closed using 2-0 Vicryl. Skin approximated using a running 3-0 Vicryl. The patient awoke from the anesthetic, extubated, and taken to recovery in stable condition.       MD TROY Hansen / SANDRA  D: 10/18/2018 09:43     T: 10/18/2018 11:50  JOB #: [id number]

## 2018-10-18 NOTE — DIABETES MGMT
DTC Progress Note    Recommendations/ Comments: Chart reviewed for extreme hyperglycemia. Noted pt received a one time dose of solumedrol 40 mg this morning at 0915    If appropriate, please consider:  Addition of Lantus 35 units Now  Changing correction insulin to insulin resistant scale    Current hospital DM medication: Humalog for correction, high sensitivity scale     Chart reviewed on Manisha Phipps. Patient is a 59 y.o. female with hx Type 2 Diabetes on concentrated regular insulin U-500 24 units before breakfast, lunch and dinner at home. A1c:   Lab Results   Component Value Date/Time    Hemoglobin A1c 8.3 (H) 09/29/2018 09:08 AM    Hemoglobin A1c 9.0 (H) 10/02/2017 08:42 AM    Hemoglobin A1c, External 10.5 03/28/2018    Hemoglobin A1c, External 7.0 02/15/2017       Recent Glucose Results:   Lab Results   Component Value Date/Time    GLUCPOC 293 (H) 10/18/2018 10:15 AM    GLUCPOC 269 (H) 10/18/2018 07:00 AM        Lab Results   Component Value Date/Time    Creatinine 0.62 09/29/2018 09:08 AM     Estimated Creatinine Clearance: 109.4 mL/min (based on SCr of 0.62 mg/dL). Active Orders   There are no active orders of the following type(s): Diet. PO intake: No data found. Will continue to follow as needed.     Thank you  Jeannette Vaughn RD, [address]  Pager: [phone]

## 2018-10-18 NOTE — ROUTINE PROCESS
Primary Nurse Hollie Ledesma and Shae Belcher, JAMIE performed a dual skin assessment on this patient No impairment noted  Alo score is 20

## 2018-10-18 NOTE — PERIOP NOTES
TRANSFER - OUT REPORT:    Verbal report given to Yobany Washington (name) on Mayela Miller  being transferred to [id number] (unit) for routine post - op       Report consisted of patients Situation, Background, Assessment and   Recommendations(SBAR). Time Pre op antibiotic ISINU:6293  Anesthesia Stop time: 0926  Mosley Present on Transfer to floor:n  Order for Mosley on Chart:  Discharge Prescriptions with Chart:    Information from the following report(s) SBAR, OR Summary, Intake/Output, MAR, Accordion and Recent Results was reviewed with the receiving nurse. Opportunity for questions and clarification was provided. Is the patient on 02? YES       L/Min 2       Other     Is the patient on a monitor? NO    Is the nurse transporting with the patient? NO    Surgical Waiting Area notified of patient's transfer from PACU? YES      The following personal items collected during your admission accompanied patient upon transfer:   Dental Appliance: Dental Appliances: None  Vision: Visual Aid: None  Hearing Aid:    Jewelry: Jewelry: None  Clothing: Clothing: (clothes to pacu)  Other Valuables:  Other Valuables: None  Valuables sent to safe:    Clothes to floor with pt

## 2018-10-18 NOTE — DIABETES MGMT
Received written order via tiger text to enter the following orders  Lantus 35 units now and daily  Lispro resistant sensitivity correction scale  Ander Abbasi RN, CDE

## 2018-10-18 NOTE — ANESTHESIA PREPROCEDURE EVALUATION
Anesthetic History No history of anesthetic complications Review of Systems / Medical History Patient summary reviewed, nursing notes reviewed and pertinent labs reviewed Pulmonary Within defined limits Neuro/Psych Within defined limits Cardiovascular Hypertension: well controlled GI/Hepatic/Renal 
Within defined limits Endo/Other Diabetes: well controlled, type 2 Blood dyscrasia Other Findings Physical Exam 
 
Airway Mallampati: II 
TM Distance: > 6 cm Neck ROM: normal range of motion Mouth opening: Normal 
 
 Cardiovascular Regular rate and rhythm,  S1 and S2 normal,  no murmur, click, rub, or gallop Dental 
No notable dental hx Pulmonary Breath sounds clear to auscultation Abdominal 
GI exam deferred Other Findings Anesthetic Plan ASA: 3 Anesthesia type: general 
 
 
 
 
Induction: Intravenous Anesthetic plan and risks discussed with: Patient

## 2018-10-18 NOTE — ROUTINE PROCESS
TRANSFER - IN REPORT:    Verbal report received from Rosa(name) on Iris Llamas  being received from Fi.tt) for routine post - op      Report consisted of patients Situation, Background, Assessment and   Recommendations(SBAR). Information from the following report(s) SBAR, Kardex, OR Summary, Procedure Summary, Intake/Output, MAR and Recent Results was reviewed with the receiving nurse. Opportunity for questions and clarification was provided. Assessment completed upon patients arrival to unit and care assumed.

## 2018-10-19 VITALS
HEIGHT: 62 IN | RESPIRATION RATE: 18 BRPM | DIASTOLIC BLOOD PRESSURE: 79 MMHG | WEIGHT: 230 LBS | OXYGEN SATURATION: 95 % | BODY MASS INDEX: 42.33 KG/M2 | TEMPERATURE: 98.3 F | HEART RATE: 104 BPM | SYSTOLIC BLOOD PRESSURE: 142 MMHG

## 2018-10-19 LAB
GLUCOSE BLD STRIP.AUTO-MCNC: 260 MG/DL (ref 65–100)
GLUCOSE BLD STRIP.AUTO-MCNC: 278 MG/DL (ref 65–100)
GLUCOSE BLD STRIP.AUTO-MCNC: 294 MG/DL (ref 65–100)
SERVICE CMNT-IMP: ABNORMAL

## 2018-10-19 PROCEDURE — 74011636637 HC RX REV CODE- 636/637: Performed by: NURSE PRACTITIONER

## 2018-10-19 PROCEDURE — 74011636637 HC RX REV CODE- 636/637: Performed by: ORTHOPAEDIC SURGERY

## 2018-10-19 PROCEDURE — 99218 HC RM OBSERVATION: CPT

## 2018-10-19 PROCEDURE — 82962 GLUCOSE BLOOD TEST: CPT

## 2018-10-19 PROCEDURE — 74011250637 HC RX REV CODE- 250/637: Performed by: PHYSICIAN ASSISTANT

## 2018-10-19 PROCEDURE — 74011250636 HC RX REV CODE- 250/636: Performed by: PHYSICIAN ASSISTANT

## 2018-10-19 RX ORDER — AMOXICILLIN 250 MG
1 CAPSULE ORAL 2 TIMES DAILY
Qty: 60 TAB | Refills: 0 | Status: SHIPPED | OUTPATIENT
Start: 2018-10-19 | End: 2019-07-12 | Stop reason: ALTCHOICE

## 2018-10-19 RX ORDER — OXYCODONE HYDROCHLORIDE 5 MG/1
5 TABLET ORAL
Qty: 40 TAB | Refills: 0 | Status: SHIPPED | OUTPATIENT
Start: 2018-10-19 | End: 2019-07-12 | Stop reason: ALTCHOICE

## 2018-10-19 RX ADMIN — DIPHENHYDRAMINE HYDROCHLORIDE 12.5 MG: 50 INJECTION INTRAMUSCULAR; INTRAVENOUS at 06:05

## 2018-10-19 RX ADMIN — FLUOXETINE 60 MG: 20 CAPSULE ORAL at 10:01

## 2018-10-19 RX ADMIN — DIPHENHYDRAMINE HYDROCHLORIDE 12.5 MG: 50 INJECTION INTRAMUSCULAR; INTRAVENOUS at 11:24

## 2018-10-19 RX ADMIN — INSULIN GLARGINE 35 UNITS: 100 INJECTION, SOLUTION SUBCUTANEOUS at 10:56

## 2018-10-19 RX ADMIN — OXYCODONE HYDROCHLORIDE 10 MG: 5 TABLET ORAL at 13:50

## 2018-10-19 RX ADMIN — VITAMIN D, TAB 1000IU (100/BT) 2000 UNITS: 25 TAB at 10:03

## 2018-10-19 RX ADMIN — METFORMIN HYDROCHLORIDE 1000 MG: 500 TABLET, EXTENDED RELEASE ORAL at 10:01

## 2018-10-19 RX ADMIN — ACETAMINOPHEN 650 MG: 325 TABLET, FILM COATED ORAL at 10:02

## 2018-10-19 RX ADMIN — LOSARTAN POTASSIUM 100 MG: 50 TABLET, FILM COATED ORAL at 10:02

## 2018-10-19 RX ADMIN — FLUTICASONE PROPIONATE 1 SPRAY: 50 SPRAY, METERED NASAL at 10:56

## 2018-10-19 RX ADMIN — HUMAN INSULIN 10 UNITS: 100 INJECTION, SOLUTION SUBCUTANEOUS at 10:54

## 2018-10-19 RX ADMIN — AMLODIPINE BESYLATE 10 MG: 5 TABLET ORAL at 10:02

## 2018-10-19 RX ADMIN — HYDROCHLOROTHIAZIDE 25 MG: 25 TABLET ORAL at 10:02

## 2018-10-19 RX ADMIN — CETIRIZINE HYDROCHLORIDE 10 MG: 10 TABLET, FILM COATED ORAL at 10:01

## 2018-10-19 RX ADMIN — INSULIN LISPRO 7 UNITS: 100 INJECTION, SOLUTION INTRAVENOUS; SUBCUTANEOUS at 06:14

## 2018-10-19 RX ADMIN — OXYCODONE HYDROCHLORIDE 10 MG: 5 TABLET ORAL at 10:02

## 2018-10-19 NOTE — PROGRESS NOTES
NUTRITION COMPLETE ASSESSMENT    RECOMMENDATIONS:   1. Consistent CHO diet  2. RD add one Glucerna Shake (chocolate) daily until appetite/intake improve  3. Consider outpatient DTC for SMBG     Interventions/Plan:   Food/Nutrient Delivery:  Modify diet/texture/consistency/nutrients(Consistent CHO + ONS) Commercial supplement(Trial Glucerna Shake (nunu))        Nutrition Education:     Coordination of Care:    Nutrition Counseling:        Assessment:   Reason for Assessment:     [x]BPA/MST (score 2 unsure wt loss) Referral     Diet: Consistent carb  Supplements: Glucerna Shake (nunu) x 1 day until intake improves  Nutritionally Significant Medications: [x] Reviewed & Includes: Miralax, Pericolace, Crestor, Glucophage XR, insulin  Meal Intake: No data found. Subjective:  UBW about what you said past year 223-230#. I follow a diabetic diet if I feel like it. Not hungry now and don't want any lunch. Chew/swallow ok; No nausea. No BM, but once I get home I'll be ok. Objective:  Hx sleep apnea w/CPAP; osteopenia;depression;Type 2 DM, Rx OHA + insulin and consistent CHO diet; HL Rx Crestor; obesity. Admit cervical stenosis, spinal canal, s/o anterior cervical fusion C6-7, wearing ASPEN collar. Malnutrition risk trigger for unknown wt loss. Morbid obesity BMI ~42. Wt trend has been high and fairly consistent x past year, in review of prior encounters ranging ~223#-230# (10/3/2017- 10/18/18). Wt does include some chronic LE edema +1. Type 2 DM, does not modify diet PTA other than watching sodium. Although usual appetite good, claims appetite, \"little before surgery and not hungry post-op\". Planned to skip lunch today. Offered Glucerna Shake (nunu) as meal replacement and accepted until appetite improves. Poor glycemic control. Last A1C8.3 (9/29/18); Noted DTC consult. May consider outpatient DTC SMBG classes. No GI complaints other than some constipation, Rx Miralax and Pericolace;  Offered prune juice. No N/V. Wearing collar and no complaints chew/swallow. Sipping on diet gingerale. Estimated Nutrition Needs:   Kcals/day: 2075 Kcals/day  Protein: 115 g(~1.1 gm/kg)  Fluid: 2100 ml(~1 mL/kcal)     Based On: Doug Ruvalcaba(MSJ x 1.2)  Weight Used: Actual wt(104.3 kg)    Pt expected to meet estimated nutrient needs: [x] Unable to predict at this time  Comparative Standards:  ;  ;      Nutrition Diagnosis:   1. Altered nutrition-related lab values related to glycemic control as evidenced by A1C 8.3 (9/9/18); Rx insulin correction scale resistant sensitivity; consistent CHO diet; DTC consult    2.  Inadequate oral intake related to post-op appetite as evidenced by reports poor appetite post-op; refuses lunch today; reports not hungry      Goals:     Consume 1 ONS daily until appetite/intake increase to minimum 75% all meals with no skipped meals     Monitoring & Evaluation:    - Carbohydrate intake, Protein intake, Liquid meal replacement   - Glucose profile, Weight/weight change, Lean body mass, fat free mass, GI     Previous Nutrition Goals Met:  N/A  Previous Recommendations:      N/A    Education & Discharge Needs:   [x] Identified and addressed: Consider DTC outpatient SMBG    [x] Participated in care plan, discharge planning, and/or interdisciplinary rounds        Cultural, Yazidism and ethnic food preferences identified:   None    Skin Integrity: [x]Intact    Edema: [x] +2 LUE, +1 B-LE & RUE  Last BM: 10/17/18  Food Allergies: [x]NKFA    Anthropometrics:    Weight Loss Metrics 10/18/2018 9/25/2018 6/12/2018 4/27/2018 4/17/2018 4/6/2018 12/8/2017   Today's Wt 230 lb 230 lb 228 lb 9.6 oz 232 lb 9.6 oz 227 lb 227 lb 12.8 oz 229 lb 12.8 oz   BMI 42.07 kg/m2 42.07 kg/m2 41.81 kg/m2 42.54 kg/m2 41.52 kg/m2 41.67 kg/m2 42.03 kg/m2      Last 3 Recorded Weights in this Encounter    10/16/18 0939 10/18/18 4738   Weight: 104.3 kg (230 lb) 104.3 kg (230 lb)      Weight Source: Patient stated  Height: 5' 2\" (157.5 cm),    Body mass index is 42.07 kg/m². IBW : 49.9 kg (110 lb),    Usual Body Weight: 102.1 kg (225 lb)(225-230#),      Labs:    Lab Results   Component Value Date/Time    Sodium 138 09/29/2018 09:08 AM    Potassium 4.5 09/29/2018 09:08 AM    Chloride 97 09/29/2018 09:08 AM    CO2 26 09/29/2018 09:08 AM    Glucose 127 (H) 09/29/2018 09:08 AM    BUN 13 09/29/2018 09:08 AM    Creatinine 0.62 09/29/2018 09:08 AM    Calcium 9.7 09/29/2018 09:08 AM    Albumin 4.2 09/29/2018 09:08 AM     Lab Results   Component Value Date/Time    Hemoglobin A1c 8.3 (H) 09/29/2018 09:08 AM    Hemoglobin A1c (POC) 8.1 09/09/2014 08:53 AM    Hemoglobin A1c, External 10.5 03/28/2018     Lab Results   Component Value Date/Time    Glucose 127 (H) 09/29/2018 09:08 AM    Glucose (POC) 278 (H) 10/19/2018 10:40 AM      Lab Results   Component Value Date/Time    ALT (SGPT) 28 09/29/2018 09:08 AM    AST (SGOT) 23 09/29/2018 09:08 AM    Alk.  phosphatase 92 09/29/2018 09:08 AM    Bilirubin, total 0.3 09/29/2018 09:08 AM        Lianna Brian RD

## 2018-10-19 NOTE — PROGRESS NOTES
Ortho Spine Daily Progress Note    Date of Surgery:  10/18/2018      Patient: Conrad Alvarez   YOB: 1960  Age: 59 y.o. SUBJECTIVE:   1 Day Post-Op following ANTERIOR CERVICAL FUSION C6-7    The patient is doing well and states moderate neck pain this morning. The patient states that their pre-operative upper extremity symptoms are improved. The patient's post operative pain is adequately controlled. The patient rates their current level of pain as 5/10, on the VAS. Has been up and to the BR overnight. The patient denies CP, SOB, N/V, dizziness, abdominal pain, HA. OBJECTIVE:     Vital Signs:    Temp (24hrs), Av °F (37.2 °C), Min:98.2 °F (36.8 °C), Max:100.1 °F (37.8 °C)      Patient Vitals for the past 24 hrs:   BP Temp Pulse Resp SpO2   10/19/18 0216 152/75 99.6 °F (37.6 °C) (!) 108 16 97 %   10/18/18 2047 167/79 100.1 °F (37.8 °C) 85 16 97 %   10/18/18 1554 136/76 100 °F (37.8 °C) (!) 113 16 91 %   10/18/18 1500 135/76 99.4 °F (37.4 °C) (!) 112 16 93 %   10/18/18 1358 145/68 98.6 °F (37 °C) (!) 115 16 94 %   10/18/18 1302 147/71 98.5 °F (36.9 °C) (!) 118 15 92 %   10/18/18 1230 145/80 -- (!) 115 11 97 %   10/18/18 1215 122/78 -- (!) 114 11 96 %   10/18/18 1200 138/72 -- (!) 113 10 96 %   10/18/18 1145 136/75 -- (!) 112 10 95 %   10/18/18 1130 141/69 -- (!) 109 10 99 %   10/18/18 1120 -- 98.4 °F (36.9 °C) -- -- --   10/18/18 1115 144/67 -- (!) 109 9 96 %   10/18/18 1100 140/74 -- (!) 104 9 97 %   10/18/18 1045 145/73 -- (!) 112 15 96 %   10/18/18 1030 149/67 -- (!) 105 11 96 %   10/18/18 1015 159/69 -- (!) 110 13 96 %   10/18/18 1000 157/75 -- (!) 106 11 96 %   10/18/18 0955 142/74 -- (!) 110 15 98 %   10/18/18 0950 166/61 98.2 °F (36.8 °C) (!) 103 17 95 %   10/18/18 0949 -- -- (!) 105 15 96 %   10/18/18 0948 -- 98.2 °F (36.8 °C) (!) 114 -- 95 %   10/18/18 0750 -- -- -- 18 --           Physical Exam:  General: A&Ox3, NAD. Respiratory: Respirations are unlabored.   Abdomen: S/NT  Surgical site: C,D and I, aspen collar in place. Musculoskeletal: Calves are S/NT, Jorge , hand intrinsics/extrinsics,wrist flexors/ extensors, biceps/triceps, deltoid intact, Jorge RP 2+  Neurological:  Jorge UE's/LE's  NVI    Laboratory Values:             No results for input(s): HGB, PLT, INR, CREA, GLU, HGBEXT, PLTEXT in the last 72 hours. No lab exists for component: INREXT  Lab Results   Component Value Date/Time    Sodium 138 09/29/2018 09:08 AM    Potassium 4.5 09/29/2018 09:08 AM    Chloride 97 09/29/2018 09:08 AM    CO2 26 09/29/2018 09:08 AM    Glucose 127 (H) 09/29/2018 09:08 AM    BUN 13 09/29/2018 09:08 AM    Creatinine 0.62 09/29/2018 09:08 AM    Calcium 9.7 09/29/2018 09:08 AM         DOUG Output: 45 ml       PLAN:     S/P ANTERIOR CERVICAL FUSION C6-7 Mobilize as tolerated. Aspen collar on at all times. Hemodynamics Stable. Wound Continue dressing changes PRN. Post Operative Pain Pain Control:D/C PCA, oxycodone for pain control. DVT Prophylaxis Continue with SCD'S, Encourage Ankle Pump Exercises, Mobilize. Discharge Disposition Discharge plan: Will focus on pain control and mobilizing. If patient is doing well, pain well-controlled and safe for discharge, may discharge home later today. Follow up in Dr Razo's office in 2 weeks. Will continue to follow progress closely. The patient was seen and evaluated by Dr Marisol Atkins who agrees with the findings and treatment plan as outlined above.          Signed By: Torrie Bustamante PA-C  October 19, 2018 6:59 AM

## 2018-10-19 NOTE — ANESTHESIA POSTPROCEDURE EVALUATION
Post-Anesthesia Evaluation and Assessment Patient: Daly Leyva MRN: [id number]  SSN: [id number] YOB: 1960  Age: 59 y.o. Sex: female Cardiovascular Function/Vital Signs Visit Vitals /75 Pulse (!) 108 Temp 37.6 °C (99.6 °F) Resp 16 Ht 5' 2\" (1.575 m) Wt 104.3 kg (230 lb) SpO2 97% BMI 42.07 kg/m² Patient is status post General anesthesia for Procedure(s): ANTERIOR CERVICAL FUSION C6-7. Nausea/Vomiting: None Postoperative hydration reviewed and adequate. Pain: 
Pain Scale 1: Numeric (0 - 10) (10/19/18 0050) Pain Intensity 1: 4 (10/19/18 0050) Managed Neurological Status:  
Neuro (WDL): (no numbness/tingling/pain) (10/18/18 1116) Neuro LUE Motor Response: Weak (10/19/18 0050) LLE Motor Response: Purposeful (10/18/18 2135) RUE Motor Response: Purposeful (10/18/18 2135) RLE Motor Response: Purposeful (10/18/18 2135) At baseline Mental Status, Level of Consciousness: Alert and  oriented to person, place, and time Pulmonary Status:  
O2 Device: Room air (10/19/18 0050) Adequate oxygenation and airway patent Complications related to anesthesia: None Post-anesthesia assessment completed. No concerns Signed By: Emanuel Christian MD   
 October 19, 2018 Procedure(s): ANTERIOR CERVICAL FUSION C6-7. Anesthesia Post Evaluation Multimodal analgesia: multimodal analgesia used between 6 hours prior to anesthesia start to PACU discharge Patient location during evaluation: bedside Patient participation: complete - patient participated Level of consciousness: awake Pain score: 2 Pain management: adequate Airway patency: patent Anesthetic complications: no 
Cardiovascular status: acceptable Respiratory status: acceptable Hydration status: acceptable Visit Vitals /75 Pulse (!) 108 Temp 37.6 °C (99.6 °F) Resp 16 Ht 5' 2\" (1.575 m) Wt 104.3 kg (230 lb) SpO2 97% BMI 42.07 kg/m²

## 2018-10-19 NOTE — DISCHARGE SUMMARY
[address] [place] [address]  [address]    DISCHARGE SUMMARY     Patient: Quinten Alpers                             Medical Record Number: [id number]                : 1960  Age: 59 y.o. Admit Date: 10/18/2018  Discharge Date:   Admission Diagnosis: CERVICAL HNP C6-7  Cervical stenosis of spinal canal  Discharge Diagnosis: CERVICAL HNP C6-7  Procedures: Procedure(s):  ANTERIOR CERVICAL FUSION C6-7  Surgeon:JULIANO Razo MD  Anesthesia: general  Complications: None       Hospital Course: Quinten Alpers was admitted the same day of surgery and underwent Procedure(s):  ANTERIOR CERVICAL FUSION C6-7 and tolerated the procedure well. She was transferred  to the recovery room in stable condition. After a brief stay the patient was then transferred to the Spine Surgery Unit at [address] [place] [address].  On postoperative day #1, the dressing was clean and dry, she was neurovascularly intact. The patient was afebrile and vital signs were stable. Calves were soft and non-tender bilaterally. On postoperative day  # 1, the patient was tolerating a regular diet and making satisfactory progress with physical therapy. Hemoglobin and INR prior to discharge were       Lab Results   Component Value Date/Time    HGB 12.0 2018 09:08 AM    INR 1.0 2018 09:08 AM         Aldo Phipps made satisfactory progress with physical therapy and was discharged to Home in stable condition on postoperative day 1. She was provided with routine postoperative instructions and advised to follow up in my office in 2 weeks following discharge from the hospital.  She was prescribed pain medication for post operative analgesia. Discharge Medications:  Current Discharge Medication List      START taking these medications    Details   oxyCODONE IR (ROXICODONE) 5 mg immediate release tablet Take 1 Tab by mouth every three (3) hours as needed.  Max Daily Amount: 40 mg.  Qty: 40 Tab, Refills: 0    Associated Diagnoses: Cervical stenosis of spinal canal      senna-docusate (PERICOLACE) 8.6-50 mg per tablet Take 1 Tab by mouth two (2) times a day. Qty: 60 Tab, Refills: 0         CONTINUE these medications which have NOT CHANGED    Details   Cetirizine (ZYRTEC) 10 mg cap Take  by mouth daily. fluticasone (FLONASE ALLERGY RELIEF) 50 mcg/actuation nasal spray 1 Caro by Both Nostrils route daily. losartan-hydroCHLOROthiazide (HYZAAR) 100-25 mg per tablet TAKE 1 TABLET BY MOUTH DAILY FOR HYPERTENSION  Qty: 90 Tab, Refills: 1      amLODIPine (NORVASC) 10 mg tablet TAKE 1 TABLET BY MOUTH DAILY  Qty: 90 Tab, Refills: 1      FLUoxetine (PROZAC) 20 mg tablet TAKE 3 TABLETS BY MOUTH EVERY DAY  Qty: 90 Tab, Refills: 5      insulin CONCENTRATED regular (HUMULIN R U-500) 500 unit/mL soln 24 Units by SubCUTAneous route Before breakfast, lunch, and dinner. rosuvastatin (CRESTOR) 20 mg tablet TAKE 1 TABLET BY MOUTH NIGHTLY  Qty: 90 Tab, Refills: 1      triamcinolone (ARISTOCORT) 0.5 % topical cream Apply  to affected area two (2) times a day. use thin layer as needed  Qty: 15 g, Refills: 1      metFORMIN ER (GLUCOPHAGE XR) 500 mg tablet Take 2 tablets by mouth two (2) times a day. Qty: 120 tablet, Refills: 0    Associated Diagnoses: Type II or unspecified type diabetes mellitus without mention of complication, uncontrolled      aspirin 81 mg Tab Take  by mouth. cholecalciferol, vitamin D3, (VITAMIN D3) 2,000 unit tab Take  by mouth.          STOP taking these medications       ibuprofen (ADVIL) 200 mg tablet Comments:   Reason for Stopping:               Signed by: Shabnam Larry PA-C  10/19/2018

## 2018-10-19 NOTE — DISCHARGE INSTRUCTIONS
[facility] Gypsy Gaytan MD    [phone]    After [address]:  Discharge Instructions Cervical (Neck) Spine Surgery     Patient Name Maegan Rahman    Date of procedure: 10/18/2018    Date of discharge:10/19/2018    Procedure (Procedure(s):  ANTERIOR CERVICAL FUSION C6-7)    Surgeon (Surgeon(s) and Role:     * Dara Posadas MD - Primary)   PCP:    Follow up appointments  -Follow up with Dr. Christian Medina in 2 weeks. Call [phone] to make an appointment as soon as you get home from the hospital.    When to call your Orthopaedic Surgeon:  -Difficulty swallowing that is worse than when you left the hospital  -Signs of infection-if your incision is red; continues to have drainage; drainage has a foul odor or if you have a persistent fever over 101 degrees for 24 hours  -Nausea or vomiting, severe headache  -Changes in sensation in your arms or legs (numbness, tingling, loss of color)  -Increased weakness-greater than before your surgery  -Severe pain or pain not relieved by medications  -Signs of a blood clot in your leg-calf pain, tenderness, redness, swelling of lower leg    When to call your Primary Care Physician:  -Concerns about medical conditions such as diabetes, high blood pressure, asthma, congestive heart failure  -Call if blood sugars are elevated, persistent headache or dizziness, coughing or congestion, constipation or diarrhea, burning with urination, abnormal heart rate    When to call 911 and go to the nearest emergency room:  -Acute onset of chest pain, shortness of breath or difficulty breathing    Activity  -You are going home a well person, be as active as possible. Your only exercise should be walking. Start with short frequent walks and increase your walking distance each day.  -Limit the amount of time you sit to 20-30 minute intervals. Sitting for prolonged periods of time will be uncomfortable for you following surgery.   - Do NOT lift anything over 10 pounds  -Do NOT do any neck exercises until you have been instructed by your doctor  -When you are in bed, you may lay on your back or on either side. Do NOT lie on your stomach      Cervical Collar (Aspen Collar)  -You are required to wear your cervical collar at all times; except while showering. You may remove the collar long enough to change the pads when needed and to change your dressing each day. -Do not bend or twist when your collar is off. It is best to have someone assist you when changing the pads and your dressing to prevent you from bending your neck. -Everyday, apply a clean set of pads to your collar. Use a mild soap and water to clean the pads. Pat them dry with a towel and lay out to air dry. Do not use heat to dry the pads. Diet  -Resume usual diet; drink plenty of fluids; eat foods high in fiber  -It is important to have regular bowel movements. Pain medications may cause constipation. You may want to take a stool softener (such as Senokot-S or Colace) to prevent constipation. If constipation occurs, take a laxative (such as Dulcolax tablets, Milk of Magnesia, or a suppository). Laxatives should only be used if the above preventable measures have failed and you still have not had a bowel moveme    Driving  -You may not drive or return to work until instructed by your physician. However, you may ride in the car for short periods of time. Incision Care  -You may take brief showers but do not run the water run directly onto the wound. After showering or bathing, remove the wet dressing and gently blot the wound dry with a soft towel.  -Do not rub or apply any lotions or ointments to your incision site.   -Do not soak or scrub your wound  -Keep a dry dressing (ABD and paper tape) on your incision and have it changed daily for 6 days after surgery; more often if your incision is draining.   Have your caregiver wash their hands thoroughly before changing your dressing.  -You will have absorbable sutures and steri-strips (white tape) on your incision. Leave the steri-strips on until they fall off. Showering  -You may shower in approximately 4 days after your surgery.    -Leave the dressing on during your shower. Do NOT allow the water to run directly onto your dressing. Once you get out of the shower, put on a dry dressing.  -Reminder- neck collars do not need to be worn while in the shower. Make sure you put clean dry pads on your collar after your shower.  -Do not take a tub bath. Preventing blood clots  -You have been given T.E.D. stockings to wear. Continue to wear these for 7 days after your discharge. Put them on in the morning and take them off at night.    -They are used to increase your circulation and prevent blood clots from forming in your legs  -T. E.D. stockings can be machine washed, temperature not to exceed 160° F (71°C) and machine dried for 15 to 20 minutes, temperature not to exceed 250° F (121°C). Pain management  -Take pain medication as prescribed; decrease the amount you use as your pain lessens  -Do not wait until you are in extreme pain to take your medication  -Avoid alcoholic beverages while taking pain medication    Pain Medication Safety  DO:  -Read the Medication Guide   -Take your medicine exactly as prescribed   -Store your medicine away from children and in a safe place   -Flush unused medicine down the toilet   -Call your healthcare provider for medical advice about side effects. You may report side effects to FDA at 2-340-FDA-7316.   -Please be aware that many medications contain Tylenol. We do not want you to over medicate so please read the information below as a guide. Do not take more than 4 Grams of Tylenol in a 24 hour period.   (There are 1000 milligrams in one Gram) Percocet contains 325 mg of Tylenol per tablet (do not take more than 12 tablets in 24 hours)  Norco contains 325 mg of Tylenol per tablet (do not take more than 12 tablets in 24 hours)  DO NOT:  -Do not give your medicine to others   -Do not take medicine unless it was prescribed for you   -Do not stop taking your medicine without talking to your healthcare provider   -Do not break, chew, crush, dissolve, or inject your medicine. If you cannot swallow your medicine whole, talk to your healthcare provider.  -Do not drink alcohol while taking this medicine  -Do not take anti-inflammatory medications unless instructed by your     physician.

## 2018-10-19 NOTE — DIABETES MGMT
DTC Ortho Education Note    Recommendations/ Comments:     Current hospital DM medication: Lantus 35 units daily, Humalog for correction, insulin resistant scale and regular insulin 60 units AC TID     Chart reviewed and initial evaluation complete on Manisha Phipps. Patient is a 59 y.o. female with hx Type 2 Diabetes on regular insulin U-500 24 units AC TID at home. Pt reports to have taken 15 units of U-500 before dinner the night before surgery per MD recommendations. She has a [employer] and checks blood sugars frequently daily. She reports blood sugars in low 100s, highest 160 mg/dl. Reports to wake up in the middle of the night with blood sugars in 40s mg/dl. I encouraged her to share that information with her endocrinologist, Dr. Mayela Brantley, since she may need a lower dose before dinner. Pt drives for work daily to [facility] and states that she checks her bg before starting to drive and keeps snacks and hypoglcyemia treatment with her all the time. She eats 3 meals a day and avoids concentrated sweets. A1c:   Lab Results   Component Value Date/Time    Hemoglobin A1c 8.3 (H) 09/29/2018 09:08 AM    Hemoglobin A1c, External 10.5 03/28/2018       Assessed and instructed patient on the following:   · risk of wound infection/ delayed healing   · interpretation of lab results, blood sugar goals, hypoglycemia prevention and treatment, SMBG skills, nutrition, referred to Diabetes Educator and site rotation.     Encouraged the following: dietary modifications: plate method, continue avoiding concentrated sweets, regular blood sugar monitoring: 3 times daily, follow up with endocrinologist      Provided patient with the following: [x]            Survival skills education materials               []            Insulin education materials               []            CHO counting education materials               [x]            BG guidelines for post-op patients                  [x] Outpatient DTC contact number               []            Glucometer               []            Insulin start kit- vial/syringe               []            Insulin start kit- pen                   Patient was able to give return demonstration of    []     Glucometer               []     saline injection     with []     no/ []     minimal assistance needed. []     Nurse to have patient self inject prior to discharge. Recent Glucose Results:   Lab Results   Component Value Date/Time    GLUCPOC 260 (H) 10/19/2018 02:13 PM    GLUCPOC 278 (H) 10/19/2018 10:40 AM    GLUCPOC 294 (H) 10/19/2018 06:09 AM        Lab Results   Component Value Date/Time    Creatinine 0.62 09/29/2018 09:08 AM     Estimated Creatinine Clearance: 109.4 mL/min (based on SCr of 0.62 mg/dL). Active Orders   Diet    DIET DIABETIC CONSISTENT CARB Regular        PO intake: No data found. Will continue to follow as needed. Thank you.     Kelin Breen RD, [address]  Pager: [phone]

## 2018-12-03 RX ORDER — AMLODIPINE BESYLATE 10 MG/1
10 TABLET ORAL DAILY
Qty: 90 TAB | Refills: 1 | Status: SHIPPED | OUTPATIENT
Start: 2018-12-03 | End: 2019-01-08 | Stop reason: SDUPTHER

## 2018-12-03 RX ORDER — FLUOXETINE 20 MG/1
60 TABLET ORAL DAILY
Qty: 270 TAB | Refills: 1 | Status: SHIPPED | OUTPATIENT
Start: 2018-12-03 | End: 2019-06-02 | Stop reason: SDUPTHER

## 2018-12-03 RX ORDER — LOSARTAN POTASSIUM AND HYDROCHLOROTHIAZIDE 25; 100 MG/1; MG/1
1 TABLET ORAL DAILY
Qty: 90 TAB | Refills: 1 | Status: SHIPPED | OUTPATIENT
Start: 2018-12-03 | End: 2019-01-08 | Stop reason: SDUPTHER

## 2018-12-03 RX ORDER — ROSUVASTATIN CALCIUM 20 MG/1
20 TABLET, COATED ORAL DAILY
Qty: 90 TAB | Refills: 1 | Status: SHIPPED | OUTPATIENT
Start: 2018-12-03 | End: 2019-01-08 | Stop reason: SDUPTHER

## 2018-12-03 RX ORDER — METFORMIN HYDROCHLORIDE 500 MG/1
1000 TABLET, EXTENDED RELEASE ORAL 2 TIMES DAILY
Qty: 360 TAB | Refills: 1 | Status: SHIPPED | OUTPATIENT
Start: 2018-12-03

## 2019-01-09 RX ORDER — AMLODIPINE BESYLATE 10 MG/1
10 TABLET ORAL DAILY
Qty: 90 TAB | Refills: 1 | Status: SHIPPED | OUTPATIENT
Start: 2019-01-09 | End: 2020-07-20

## 2019-01-09 RX ORDER — LOSARTAN POTASSIUM AND HYDROCHLOROTHIAZIDE 25; 100 MG/1; MG/1
1 TABLET ORAL DAILY
Qty: 90 TAB | Refills: 1 | Status: SHIPPED | OUTPATIENT
Start: 2019-01-09 | End: 2020-03-09 | Stop reason: SDUPTHER

## 2019-01-09 RX ORDER — ROSUVASTATIN CALCIUM 20 MG/1
20 TABLET, COATED ORAL DAILY
Qty: 90 TAB | Refills: 1 | Status: SHIPPED | OUTPATIENT
Start: 2019-01-09 | End: 2020-02-28

## 2019-01-09 RX ORDER — FLUTICASONE PROPIONATE 50 MCG
2 SPRAY, SUSPENSION (ML) NASAL DAILY
Qty: 3 BOTTLE | Refills: 1 | Status: SHIPPED | OUTPATIENT
Start: 2019-01-09

## 2019-06-04 RX ORDER — FLUOXETINE 20 MG/1
60 TABLET ORAL DAILY
Qty: 270 TAB | Refills: 0 | Status: SHIPPED | OUTPATIENT
Start: 2019-06-04 | End: 2019-06-13 | Stop reason: SDUPTHER

## 2019-06-13 RX ORDER — FLUOXETINE 20 MG/1
60 TABLET ORAL DAILY
Qty: 270 TAB | Refills: 0 | Status: SHIPPED | OUTPATIENT
Start: 2019-06-13 | End: 2019-06-21

## 2019-06-21 ENCOUNTER — TELEPHONE (OUTPATIENT)
Dept: INTERNAL MEDICINE CLINIC | Age: 59
End: 2019-06-21

## 2019-06-21 RX ORDER — FLUOXETINE HYDROCHLORIDE 20 MG/1
CAPSULE ORAL
Qty: 270 CAP | Refills: 0 | Status: SHIPPED | OUTPATIENT
Start: 2019-06-21 | End: 2019-09-17 | Stop reason: SDUPTHER

## 2019-06-21 RX ORDER — FLUOXETINE 20 MG/1
60 TABLET ORAL DAILY
Qty: 270 TAB | Refills: 0 | Status: CANCELLED | OUTPATIENT
Start: 2019-06-21

## 2019-06-21 NOTE — TELEPHONE ENCOUNTER
[id number] [address] (Pharmacy) [phone]     Pharmacy states the Fluoxetine HCL tabs cost $896.99. Asked to change to capsules.

## 2019-07-12 ENCOUNTER — OFFICE VISIT (OUTPATIENT)
Dept: INTERNAL MEDICINE CLINIC | Age: 59
End: 2019-07-12

## 2019-07-12 VITALS
DIASTOLIC BLOOD PRESSURE: 64 MMHG | RESPIRATION RATE: 16 BRPM | TEMPERATURE: 98.9 F | WEIGHT: 232.2 LBS | HEART RATE: 95 BPM | SYSTOLIC BLOOD PRESSURE: 122 MMHG | HEIGHT: 62 IN | BODY MASS INDEX: 42.73 KG/M2 | OXYGEN SATURATION: 97 %

## 2019-07-12 DIAGNOSIS — E66.01 MORBID OBESITY (HCC): ICD-10-CM

## 2019-07-12 DIAGNOSIS — E78.5 HYPERLIPIDEMIA LDL GOAL <100: ICD-10-CM

## 2019-07-12 DIAGNOSIS — E11.49 TYPE II OR UNSPECIFIED TYPE DIABETES MELLITUS WITH NEUROLOGICAL MANIFESTATIONS, UNCONTROLLED(250.62) (HCC): Primary | ICD-10-CM

## 2019-07-12 DIAGNOSIS — D64.9 ANEMIA, UNSPECIFIED TYPE: ICD-10-CM

## 2019-07-12 DIAGNOSIS — F32.5 MAJOR DEPRESSIVE DISORDER WITH SINGLE EPISODE, IN FULL REMISSION (HCC): ICD-10-CM

## 2019-07-12 DIAGNOSIS — I10 ESSENTIAL HYPERTENSION: ICD-10-CM

## 2019-07-12 DIAGNOSIS — G47.33 OBSTRUCTIVE SLEEP APNEA SYNDROME: ICD-10-CM

## 2019-07-12 DIAGNOSIS — E11.21 TYPE 2 DIABETES WITH NEPHROPATHY (HCC): Primary | ICD-10-CM

## 2019-07-12 DIAGNOSIS — H69.82 EUSTACHIAN TUBE DYSFUNCTION, LEFT: ICD-10-CM

## 2019-07-12 DIAGNOSIS — E03.9 ACQUIRED HYPOTHYROIDISM: ICD-10-CM

## 2019-07-12 RX ORDER — LEVOTHYROXINE SODIUM 50 UG/1
TABLET ORAL
Refills: 2 | COMMUNITY
Start: 2019-04-30 | End: 2021-02-02 | Stop reason: ALTCHOICE

## 2019-07-12 NOTE — PROGRESS NOTES
HPI:  Jerrica Delacruz is a 59y.o. year old female who returns to clinic today for follow up: Adult onset diabetes mellitus, depression, HTN and hypercholesterolemia.      1.  Cardiovascular in adult onset diabetes mellitus.: She reports taking medications as instructed, no medication side effects noted, The home BP readings outside of office have been not checking. .  Diet and Lifestyle: sometimes follows a low fat low cholesterol diet, generally follows a low sodium diet. Exercise: none. 2.  DM: followed by Dr Bassam Quiroz and last saw 2 months ago. BS running average am  and mid morning 159 and mid afternoon 191 and evening 115. She has freestyle [place]. States sometimes following diabetic diet. Taking medication daily. She is followed by endocrine. lab work requested  3. Depression: Well-controlled on current medication denies any sadness or suicidal ideation. Current Outpatient Medications   Medication Sig Dispense Refill    levothyroxine (SYNTHROID) 50 mcg tablet TAKE 1 TABLET BY MOUTH EVERY DAY ON EMPTY STOMACH IN THE MORNING  2    FLUoxetine (PROZAC) 20 mg capsule Take 3 tabs by mouth Daily 270 Cap 0    rosuvastatin (CRESTOR) 20 mg tablet Take 1 Tab by mouth daily. 90 Tab 1    losartan-hydroCHLOROthiazide (HYZAAR) 100-25 mg per tablet Take 1 Tab by mouth daily. 90 Tab 1    amLODIPine (NORVASC) 10 mg tablet Take 1 Tab by mouth daily. 90 Tab 1    fluticasone (FLONASE ALLERGY RELIEF) 50 mcg/actuation nasal spray 2 Sprays by Both Nostrils route daily. 3 Bottle 1    metFORMIN ER (GLUCOPHAGE XR) 500 mg tablet Take 2 Tabs by mouth two (2) times a day. 360 Tab 1    Cetirizine (ZYRTEC) 10 mg cap Take  by mouth daily.  insulin CONCENTRATED regular (HUMULIN R U-500) 500 unit/mL soln 24 Units by SubCUTAneous route Before breakfast, lunch, and dinner.  cholecalciferol, vitamin D3, (VITAMIN D3) 2,000 unit tab Take  by mouth.  aspirin 81 mg Tab Take  by mouth.       triamcinolone (ARISTOCORT) 0.5 % topical cream Apply  to affected area two (2) times a day. use thin layer as needed 15 g 1     Allergies   Allergen Reactions    Tenormin [Atenolol] Other (comments)     depression    Vicodin [Hydrocodone-Acetaminophen] Nausea and Vomiting     Social History     Tobacco Use    Smoking status: Former Smoker     Packs/day: 2.00     Years: 20.00     Pack years: 40.00     Last attempt to quit: 1992     Years since quittin.1    Smokeless tobacco: Never Used   Substance Use Topics    Alcohol use: Yes     Comment: rarely         Review of Systems   Constitutional: Negative for malaise/fatigue. Respiratory: Negative for shortness of breath. Cardiovascular: Negative for chest pain and leg swelling. Gastrointestinal: Negative for abdominal pain and heartburn. Genitourinary: Negative for dysuria, frequency and urgency. Neurological: Negative for dizziness and headaches. Psychiatric/Behavioral: Negative for depression and suicidal ideas. Physical Exam   Constitutional: She appears well-developed. No distress. HENT:   Head: Normocephalic and atraumatic. Neck: Carotid bruit is not present. Cardiovascular: Normal rate, regular rhythm, normal heart sounds and intact distal pulses. No murmur heard. Pulmonary/Chest: Effort normal and breath sounds normal. She has no wheezes. Abdominal: Soft. Bowel sounds are normal. She exhibits no distension and no mass. There is no tenderness. Musculoskeletal: She exhibits no edema. Psychiatric: She has a normal mood and affect. Nursing note and vitals reviewed. Monofilament intact bilaterally. Pulses 2+ bilaterally. Dry flaking skin noted over plantar region. Calluses noted. No open skin lesions.   Visit Vitals  /64 (BP 1 Location: Right arm)   Pulse 95   Temp 98.9 °F (37.2 °C) (Oral)   Resp 16   Ht 5' 2\" (1.575 m)   Wt 232 lb 3.2 oz (105.3 kg)   LMP 1998   SpO2 97%   BMI 42.47 kg/m²       Assessment & Plan:    ICD-10-CM ICD-9-CM 1. Type 2 diabetes with nephropathy ([facility] continue current regimen as per endocrine. Continue to work on lifestyle diabetic diet. She has not had any lab work done since September 2018. Emphasized importance of rechecking lab work to assess for goal.  She will have this done today. E11.21 250.40 HEMOGLOBIN A1C WITH EAG     976.68 METABOLIC PANEL, COMPREHENSIVE      CANCELED: MICROALBUMIN, UR, RAND W/ MICROALB/CREAT RATIO   2. Morbid obesity ([facility]  Counseled regarding elevated BMI and current weight goals. Reviewed weight loss strategies including dietary changes and exercise. E66.01 278.01    3. Major depressive disorder with single episode, in full remission ([facility]  Well-controlled continue current medication. F32.5 296.26    4. Essential hypertension  Well-controlled continue current medication. I10 401.9    5. Hyperlipidemia LDL goal <100  Continue current plan and check lab work. E78.5 272.4 LIPID PANEL   6. Obstructive sleep apnea syndrome G47.33 327.23    7. Eustachian tube dysfunction, left  Restart Flonase. Antihistamine as needed H69.82 381.81    8. Acquired hypothyroidism  Continue current plan and check lab work. E03.9 244.9 T4, FREE      TSH 3RD GENERATION   9. Anemia, unspecified type history  Recheck CBC. D64.9 285.9 CBC WITH AUTOMATED DIFF   Follow-up 6 months or earlier if any problems. Orders Placed This Encounter    HEMOGLOBIN A1C WITH EAG    CBC WITH AUTOMATED DIFF    METABOLIC PANEL, COMPREHENSIVE    LIPID PANEL    T4, FREE    TSH 3RD GENERATION    SPECIMEN STATUS REPORT    levothyroxine (SYNTHROID) 50 mcg tablet    varicella-zoster recombinant, PF, (SHINGRIX, PF,) 50 mcg/0.5 mL susr injection         Advised her to call back or return to office if symptoms worsen/change/persist.  Discussed expected course/resolution/complications of diagnosis in detail with patient. Medication risks/benefits/costs/interactions/alternatives discussed with patient.   She was given an after visit summary which includes diagnoses, current medications, & vitals. She expressed understanding with the diagnosis and plan.

## 2019-07-13 LAB
ALBUMIN SERPL-MCNC: 4.4 G/DL (ref 3.6–4.8)
ALBUMIN/GLOB SERPL: 2 {RATIO} (ref 1.2–2.2)
ALP SERPL-CCNC: 113 IU/L (ref 39–117)
ALT SERPL-CCNC: 21 IU/L (ref 0–32)
AST SERPL-CCNC: 19 IU/L (ref 0–40)
BASOPHILS # BLD AUTO: 0.1 X10E3/UL (ref 0–0.2)
BASOPHILS NFR BLD AUTO: 1 %
BILIRUB SERPL-MCNC: <0.2 MG/DL (ref 0–1.2)
BUN SERPL-MCNC: 15 MG/DL (ref 8–27)
BUN/CREAT SERPL: 22 (ref 12–28)
CALCIUM SERPL-MCNC: 10 MG/DL (ref 8.7–10.3)
CHLORIDE SERPL-SCNC: 101 MMOL/L (ref 96–106)
CHOLEST SERPL-MCNC: 131 MG/DL (ref 100–199)
CO2 SERPL-SCNC: 24 MMOL/L (ref 20–29)
CREAT SERPL-MCNC: 0.68 MG/DL (ref 0.57–1)
EOSINOPHIL # BLD AUTO: 0.2 X10E3/UL (ref 0–0.4)
EOSINOPHIL NFR BLD AUTO: 3 %
ERYTHROCYTE [DISTWIDTH] IN BLOOD BY AUTOMATED COUNT: 14.6 % (ref 12.3–15.4)
EST. AVERAGE GLUCOSE BLD GHB EST-MCNC: 174 MG/DL
GLOBULIN SER CALC-MCNC: 2.2 G/DL (ref 1.5–4.5)
GLUCOSE SERPL-MCNC: 66 MG/DL (ref 65–99)
HBA1C MFR BLD: 7.7 % (ref 4.8–5.6)
HCT VFR BLD AUTO: 40.5 % (ref 34–46.6)
HDLC SERPL-MCNC: 43 MG/DL
HGB BLD-MCNC: 12.8 G/DL (ref 11.1–15.9)
IMM GRANULOCYTES # BLD AUTO: 0 X10E3/UL (ref 0–0.1)
IMM GRANULOCYTES NFR BLD AUTO: 0 %
LDLC SERPL CALC-MCNC: 52 MG/DL (ref 0–99)
LYMPHOCYTES # BLD AUTO: 3.4 X10E3/UL (ref 0.7–3.1)
LYMPHOCYTES NFR BLD AUTO: 38 %
MCH RBC QN AUTO: 27.5 PG (ref 26.6–33)
MCHC RBC AUTO-ENTMCNC: 31.6 G/DL (ref 31.5–35.7)
MCV RBC AUTO: 87 FL (ref 79–97)
MONOCYTES # BLD AUTO: 0.5 X10E3/UL (ref 0.1–0.9)
MONOCYTES NFR BLD AUTO: 6 %
NEUTROPHILS # BLD AUTO: 4.8 X10E3/UL (ref 1.4–7)
NEUTROPHILS NFR BLD AUTO: 52 %
PLATELET # BLD AUTO: 400 X10E3/UL (ref 150–450)
POTASSIUM SERPL-SCNC: 4.3 MMOL/L (ref 3.5–5.2)
PROT SERPL-MCNC: 6.6 G/DL (ref 6–8.5)
RBC # BLD AUTO: 4.66 X10E6/UL (ref 3.77–5.28)
SODIUM SERPL-SCNC: 143 MMOL/L (ref 134–144)
SPECIMEN STATUS REPORT, ROLRST: NORMAL
T4 FREE SERPL-MCNC: 1.06 NG/DL (ref 0.82–1.77)
TRIGL SERPL-MCNC: 178 MG/DL (ref 0–149)
TSH SERPL DL<=0.005 MIU/L-ACNC: 2.55 UIU/ML (ref 0.45–4.5)
VLDLC SERPL CALC-MCNC: 36 MG/DL (ref 5–40)
WBC # BLD AUTO: 9 X10E3/UL (ref 3.4–10.8)

## 2019-07-15 NOTE — PROGRESS NOTES
Toopher message sent. Hemoglobin A1c is improving. Continue same regimen of medications as per endocrine and continue to work on lifestyle changes and diabetic diet.

## 2019-10-09 LAB — HBA1C MFR BLD HPLC: 8.1 %

## 2019-10-25 ENCOUNTER — OFFICE VISIT (OUTPATIENT)
Dept: INTERNAL MEDICINE CLINIC | Age: 59
End: 2019-10-25

## 2019-10-25 VITALS
OXYGEN SATURATION: 97 % | HEIGHT: 62 IN | DIASTOLIC BLOOD PRESSURE: 81 MMHG | SYSTOLIC BLOOD PRESSURE: 125 MMHG | BODY MASS INDEX: 43.06 KG/M2 | WEIGHT: 234 LBS | HEART RATE: 92 BPM | TEMPERATURE: 98.2 F | RESPIRATION RATE: 18 BRPM

## 2019-10-25 DIAGNOSIS — I10 ESSENTIAL HYPERTENSION: ICD-10-CM

## 2019-10-25 DIAGNOSIS — F32.5 MAJOR DEPRESSIVE DISORDER WITH SINGLE EPISODE, IN FULL REMISSION (HCC): ICD-10-CM

## 2019-10-25 DIAGNOSIS — E78.5 HYPERLIPIDEMIA LDL GOAL <100: ICD-10-CM

## 2019-10-25 DIAGNOSIS — E03.9 ACQUIRED HYPOTHYROIDISM: ICD-10-CM

## 2019-10-25 DIAGNOSIS — Z00.00 ROUTINE GENERAL MEDICAL EXAMINATION AT A HEALTH CARE FACILITY: Primary | ICD-10-CM

## 2019-10-25 DIAGNOSIS — E11.21 TYPE 2 DIABETES WITH NEPHROPATHY (HCC): ICD-10-CM

## 2019-10-25 DIAGNOSIS — Z78.0 POSTMENOPAUSAL: ICD-10-CM

## 2019-10-25 NOTE — PROGRESS NOTES
Subjective: Siena Raines is here today for a full physical.      Health Maintenance  Immunizations:    Influenza: up to date. Tetanus: up to date. Gardasil: n/a. Cancer screening:    . S/p hyst and bilateral mastectomy. Colonoscopy: last done 2015. Bone density: due now last done 2013. Exercise: none  Also here for follow-up of chronic medical problems: Has been doing well on current medication denies any side effects on medicines. Diabetes mellitus: Seeing endocrine who just did lab work on her and will request this. Average blood sugar 150. Patient Care Team:  Annabel Bravo MD as PCP - General (Internal Medicine)  Maria Luisa Dooley MD (Endocrinology)  Christos Edwards MD as Physician (Ophthalmology)     The following sections were reviewed & updated as appropriate: PMH, PSH, FH, and SH. Patient Active Problem List   Diagnosis Code    Obstructive sleep apnea syndrome G47.33    Morbid obesity ([facility] E66.01    Incisional hernia K43.2    History of carcinoma in situ of breast Z86.000    Mechanical complication due to breast implant T85.49XA    Osteopenia M85.80    Essential hypertension I10    Depression F32.9    ACP (advance care planning) Z71.89    Hyperlipidemia LDL goal <100 E78.5    Type 2 diabetes with nephropathy ([facility] E11.21    Type 2 diabetes mellitus with diabetic neuropathy (HCC) E11.40    H/O bilateral mastectomy Z90.13    Cervical stenosis of spinal canal M48.02          Prior to Admission medications    Medication Sig Start Date End Date Taking? Authorizing Provider   FLUoxetine (PROZAC) 20 mg capsule TAKE 3 TABLETS BY MOUTH EVERY DAY 9/18/19  Yes Annabel Bravo MD   levothyroxine (SYNTHROID) 50 mcg tablet TAKE 1 TABLET BY MOUTH EVERY DAY ON EMPTY STOMACH IN THE MORNING 4/30/19  Yes Provider, Historical   rosuvastatin (CRESTOR) 20 mg tablet Take 1 Tab by mouth daily.  1/9/19  Yes Annabel Bravo MD   losartan-hydroCHLOROthiazide (HYZAAR) 100-25 mg per tablet Take 1 Tab by mouth daily. 1/9/19  Yes Quinn Madison MD   amLODIPine (NORVASC) 10 mg tablet Take 1 Tab by mouth daily. 1/9/19  Yes Quinn Madison MD   fluticasone [facility] ALLERGY RELIEF) 50 mcg/actuation nasal spray 2 Sprays by Both Nostrils route daily. 1/9/19  Yes Quinn Madison MD   metFORMIN ER (GLUCOPHAGE XR) 500 mg tablet Take 2 Tabs by mouth two (2) times a day. 12/3/18  Yes Quinn Madison MD   Cetirizine (ZYRTEC) 10 mg cap Take  by mouth daily. Yes Provider, Historical   insulin CONCENTRATED regular (HUMULIN R U-500) 500 unit/mL soln 28 Units by SubCUTAneous route Before breakfast, lunch, and dinner. Indications: 28 units breakfast and lunch, 20 units at dinner   Yes Provider, Historical   cholecalciferol, vitamin D3, (VITAMIN D3) 2,000 unit tab Take  by mouth. Yes Provider, Historical   aspirin 81 mg Tab Take  by mouth. Yes Provider, Historical   triamcinolone (ARISTOCORT) 0.5 % topical cream Apply  to affected area two (2) times a day. use thin layer as needed 4/27/18   Quinn Madison MD          Allergies   Allergen Reactions    Tenormin [Atenolol] Other (comments)     depression    Vicodin [Hydrocodone-Acetaminophen] Nausea and Vomiting              Review of Systems   Constitutional: Negative for chills, fever and malaise/fatigue. HENT: Negative for congestion and sore throat. Eyes: Negative for blurred vision and double vision. Respiratory: Negative for cough, shortness of breath and wheezing. Cardiovascular: Negative for chest pain, palpitations and leg swelling. Gastrointestinal: Negative for abdominal pain, blood in stool, constipation, diarrhea, heartburn, nausea and vomiting. Genitourinary: Negative for dysuria, frequency and urgency. Musculoskeletal: Negative for back pain, joint pain and myalgias. Skin: Negative for rash. Neurological: Negative for dizziness, sensory change, focal weakness and headaches. Psychiatric/Behavioral: Negative for depression.  The patient is not nervous/anxious and does not have insomnia. Objective:   Physical Exam   Constitutional: She appears well-developed. No distress. HENT:   Head: Normocephalic and atraumatic. Right Ear: Tympanic membrane and ear canal normal.   Left Ear: Tympanic membrane and ear canal normal.   Nose: Nose normal.   Mouth/Throat: Oropharynx is clear and moist.   Eyes: Pupils are equal, round, and reactive to light. Conjunctivae and EOM are normal.   Neck: Normal range of motion. No thyromegaly present. Cardiovascular: Normal rate, regular rhythm, normal heart sounds and intact distal pulses. No murmur heard. Pulmonary/Chest: Effort normal and breath sounds normal.   Status post bilateral mastectomy with reconstruction. No changes in skin no adenopathy   Abdominal: Soft. Bowel sounds are normal. She exhibits no mass. There is no tenderness. Incisional hernia present   Musculoskeletal: She exhibits no edema. Lymphadenopathy:     She has no cervical adenopathy. Neurological: She has normal strength. No cranial nerve deficit or sensory deficit. Skin: No rash noted. Psychiatric: She has a normal mood and affect. Nursing note and vitals reviewed. Visit Vitals  /81 (BP 1 Location: Left arm)   Pulse (!) 108   Temp 98.2 °F (36.8 °C) (Oral)   Resp 18   Ht 5' 2\" (1.575 m)   Wt 234 lb (106.1 kg)   LMP 06/01/1998   SpO2 97%   BMI 42.80 kg/m²         Mary Tafoya is a 62 y.o. female who was seen in clinic today (10/25/2019) for a full physical.       Assessment & Plan:   Encounter Diagnoses   Name Primary?  Routine general medical examination at a health care facility  Health maintenance reviewed and updated with patient today at visit. Yes    Type 2 diabetes with nephropathy ([facility]  Continue to work on diabetic continuous blood sugar monitor now which is helped her management of her diabetes we will obtain her recent lab work.  Essential hypertension  Continue current medication. Well-controlled     Hyperlipidemia LDL goal <100  Check last lab work. Continue current medication.  Major depressive disorder with single episode, in full remission ([address]  Well-controlled continue current medication.  Acquired hypothyroidism     Postmenopausal/osteopenia: Recommend bone density. Continue with calcium and diet, vitamin D and exercise. Orders Placed This Encounter    DEXA BONE DENSITY STUDY AXIAL    MICROALBUMIN, UR, RAND W/ MICROALB/CREAT RATIO   . Follow-up in 6 months. Advised her to call back or return to office if symptoms worsen/change/persist.  Discussed expected course/resolution/complications of diagnosis in detail with patient. Medication risks/benefits/costs/interactions/alternatives discussed with patient. She was given an after visit summary which includes diagnoses, current medications, & vitals. She expressed understanding with the diagnosis and plan. Aspects of this note may have been generated using voice recognition software. Despite editing, there may be some syntax errors.        Jessica Adame MD

## 2019-10-26 LAB
ALBUMIN/CREAT UR: <5 MG/G CREAT (ref 0–30)
CREAT UR-MCNC: 59.9 MG/DL
MICROALBUMIN UR-MCNC: <3 UG/ML

## 2019-11-22 ENCOUNTER — HOSPITAL ENCOUNTER (OUTPATIENT)
Dept: BONE DENSITY | Age: 59
Discharge: HOME | End: 2019-11-22
Attending: INTERNAL MEDICINE
Payer: COMMERCIAL

## 2019-11-22 DIAGNOSIS — Z78.0 POSTMENOPAUSAL: ICD-10-CM

## 2019-11-22 PROCEDURE — 77080 DXA BONE DENSITY AXIAL: CPT

## 2019-12-03 RX ORDER — AMLODIPINE BESYLATE 10 MG/1
TABLET ORAL
Qty: 30 TAB | Refills: 5 | Status: SHIPPED | OUTPATIENT
Start: 2019-12-03 | End: 2019-12-20 | Stop reason: SDUPTHER

## 2019-12-18 NOTE — PROGRESS NOTES
Notify pt her bone density shows osteoporosis and would like her to make an appt to discuss treatment options.

## 2019-12-20 ENCOUNTER — OFFICE VISIT (OUTPATIENT)
Dept: INTERNAL MEDICINE CLINIC | Age: 59
End: 2019-12-20

## 2019-12-20 VITALS
HEART RATE: 97 BPM | SYSTOLIC BLOOD PRESSURE: 142 MMHG | OXYGEN SATURATION: 98 % | BODY MASS INDEX: 43.24 KG/M2 | WEIGHT: 235 LBS | RESPIRATION RATE: 18 BRPM | HEIGHT: 62 IN | TEMPERATURE: 98.6 F | DIASTOLIC BLOOD PRESSURE: 68 MMHG

## 2019-12-20 DIAGNOSIS — E11.21 TYPE 2 DIABETES WITH NEPHROPATHY (HCC): ICD-10-CM

## 2019-12-20 DIAGNOSIS — M81.0 AGE-RELATED OSTEOPOROSIS WITHOUT CURRENT PATHOLOGICAL FRACTURE: Primary | ICD-10-CM

## 2019-12-20 DIAGNOSIS — Z12.11 SCREEN FOR COLON CANCER: ICD-10-CM

## 2019-12-20 RX ORDER — ALENDRONATE SODIUM 70 MG/1
70 TABLET ORAL
Qty: 12 TAB | Refills: 1 | Status: SHIPPED | OUTPATIENT
Start: 2019-12-20 | End: 2020-01-16 | Stop reason: SINTOL

## 2019-12-20 NOTE — PROGRESS NOTES
HPI:  Rigo Diehl is a 62y.o. year old female who returns to clinic today for an acute visit: to review recent bone density with osteoporosis. Reviewed bone density in detail today. T score now at level of osteoporosis. She reports calcium on average 2 dairy servings. Taking D3 2000 iu daily. No recent falls  Exercise: none  Osteoporosis in family PGM and MA. Hx left forearm fracture 2 years ago GLF. Reviewed last endocrine note and a1c 8.1. Current Outpatient Medications   Medication Sig Dispense Refill    FLUoxetine (PROZAC) 20 mg capsule TAKE 3 TABLETS BY MOUTH EVERY  Cap 1    levothyroxine (SYNTHROID) 50 mcg tablet TAKE 1 TABLET BY MOUTH EVERY DAY ON EMPTY STOMACH IN THE MORNING  2    rosuvastatin (CRESTOR) 20 mg tablet Take 1 Tab by mouth daily. 90 Tab 1    losartan-hydroCHLOROthiazide (HYZAAR) 100-25 mg per tablet Take 1 Tab by mouth daily. 90 Tab 1    amLODIPine (NORVASC) 10 mg tablet Take 1 Tab by mouth daily. 90 Tab 1    fluticasone (FLONASE ALLERGY RELIEF) 50 mcg/actuation nasal spray 2 Sprays by Both Nostrils route daily. 3 Bottle 1    metFORMIN ER (GLUCOPHAGE XR) 500 mg tablet Take 2 Tabs by mouth two (2) times a day. 360 Tab 1    Cetirizine (ZYRTEC) 10 mg cap Take  by mouth daily.  insulin CONCENTRATED regular (HUMULIN R U-500) 500 unit/mL soln 28 Units by SubCUTAneous route Before breakfast, lunch, and dinner. Indications: 28 units breakfast and lunch, 20 units at dinner      triamcinolone (ARISTOCORT) 0.5 % topical cream Apply  to affected area two (2) times a day. use thin layer as needed 15 g 1    cholecalciferol, vitamin D3, (VITAMIN D3) 2,000 unit tab Take  by mouth.  aspirin 81 mg Tab Take  by mouth.        Allergies   Allergen Reactions    Tenormin [Atenolol] Other (comments)     depression    Vicodin [Hydrocodone-Acetaminophen] Nausea and Vomiting     Social History     Tobacco Use    Smoking status: Former Smoker     Packs/day: 2.00     Years: 20.00     Pack years: 40.00     Last attempt to quit: 1992     Years since quittin.5    Smokeless tobacco: Never Used   Substance Use Topics    Alcohol use: Yes     Comment: rarely         Review of Systems   Respiratory: Negative for shortness of breath. Cardiovascular: Negative for chest pain and leg swelling. Gastrointestinal: Negative for abdominal pain and heartburn. Physical Exam  Vitals signs and nursing note reviewed. Constitutional:       General: She is not in acute distress. Appearance: She is well-developed. HENT:      Head: Normocephalic and atraumatic. Cardiovascular:      Rate and Rhythm: Normal rate and regular rhythm. Pulmonary:      Effort: Pulmonary effort is normal.      Breath sounds: Normal breath sounds. Abdominal:      General: Bowel sounds are normal.      Palpations: Abdomen is soft. Tenderness: There is no tenderness. Assessment & Plan:    ICD-10-CM ICD-9-CM    1. Age-related osteoporosis without current pathological fracture  Counseled regarding medication treatment and will start Fosamax weekly. Reviewed potential side effects of medication. She will contact me if any problems. Otherwise follow-up in 3 months. Calcium and vitamin D and given a handout regarding this M81.0 733.01 VITAMIN D, 25 HYDROXY      METABOLIC PANEL, COMPREHENSIVE   2. Type 2 diabetes with nephropathy (HCC) E11.21 250.40      583.81    3. Screen for colon cancer Z12.11 V76.51 COLOGUARD TEST (FECAL DNA COLORECTAL CANCER SCREENING)     Orders Placed This Encounter    AMB EXT HGBA1C    VITAMIN D, 25 HYDROXY    COLOGUARD TEST (FECAL DNA COLORECTAL CANCER SCREENING)    METABOLIC PANEL, COMPREHENSIVE    alendronate (FOSAMAX) 70 mg tablet    follow up 3 months         Advised her to call back or return to office if symptoms worsen/change/persist.  Discussed expected course/resolution/complications of diagnosis in detail with patient.     Medication risks/benefits/costs/interactions/alternatives discussed with patient. She was given an after visit summary which includes diagnoses, current medications, & vitals. She expressed understanding with the diagnosis and plan.

## 2019-12-21 LAB
25(OH)D3+25(OH)D2 SERPL-MCNC: 35.5 NG/ML (ref 30–100)
ALBUMIN SERPL-MCNC: 4.3 G/DL (ref 3.6–4.8)
ALBUMIN/GLOB SERPL: 2.5 {RATIO} (ref 1.2–2.2)
ALP SERPL-CCNC: 98 IU/L (ref 39–117)
ALT SERPL-CCNC: 28 IU/L (ref 0–32)
AST SERPL-CCNC: 21 IU/L (ref 0–40)
BILIRUB SERPL-MCNC: 0.3 MG/DL (ref 0–1.2)
BUN SERPL-MCNC: 8 MG/DL (ref 8–27)
BUN/CREAT SERPL: 18 (ref 12–28)
CALCIUM SERPL-MCNC: 9.3 MG/DL (ref 8.7–10.3)
CHLORIDE SERPL-SCNC: 101 MMOL/L (ref 96–106)
CO2 SERPL-SCNC: 23 MMOL/L (ref 20–29)
CREAT SERPL-MCNC: 0.44 MG/DL (ref 0.57–1)
GLOBULIN SER CALC-MCNC: 1.7 G/DL (ref 1.5–4.5)
GLUCOSE SERPL-MCNC: 135 MG/DL (ref 65–99)
POTASSIUM SERPL-SCNC: 4.4 MMOL/L (ref 3.5–5.2)
PROT SERPL-MCNC: 6 G/DL (ref 6–8.5)
SODIUM SERPL-SCNC: 138 MMOL/L (ref 134–144)

## 2020-03-09 RX ORDER — FLUOXETINE HYDROCHLORIDE 20 MG/1
CAPSULE ORAL
Qty: 270 CAP | Refills: 1 | Status: SHIPPED | OUTPATIENT
Start: 2020-03-09 | End: 2020-09-14

## 2020-03-09 RX ORDER — LOSARTAN POTASSIUM AND HYDROCHLOROTHIAZIDE 25; 100 MG/1; MG/1
1 TABLET ORAL DAILY
Qty: 90 TAB | Refills: 0 | Status: SHIPPED | OUTPATIENT
Start: 2020-03-09 | End: 2020-06-02

## 2020-03-09 NOTE — TELEPHONE ENCOUNTER
----- Message from Randall Dutton sent at 3/7/2020  6:34 AM EST -----  Regarding: Prescription Question  Contact: [phone]  Need to refill Losartan HCTZ 100/25 as I ran out. Please call in to [facility] on [address]. [facility] is no longer my pharmacy.   And, thank you!!

## 2020-06-02 RX ORDER — LOSARTAN POTASSIUM AND HYDROCHLOROTHIAZIDE 25; 100 MG/1; MG/1
TABLET ORAL
Qty: 90 TAB | Refills: 0 | Status: SHIPPED | OUTPATIENT
Start: 2020-06-02 | End: 2020-10-07

## 2020-06-08 ENCOUNTER — TELEPHONE (OUTPATIENT)
Dept: INTERNAL MEDICINE CLINIC | Age: 60
End: 2020-06-08

## 2020-06-08 NOTE — TELEPHONE ENCOUNTER
Pt said she  Does not have insurance at this this  And is unemployed  Pt said as soon as she gets insurance she will sched an appt

## 2020-06-09 ENCOUNTER — TELEPHONE (OUTPATIENT)
Dept: INTERNAL MEDICINE CLINIC | Age: 60
End: 2020-06-09

## 2020-06-24 ENCOUNTER — TELEPHONE (OUTPATIENT)
Dept: INTERNAL MEDICINE CLINIC | Age: 60
End: 2020-06-24

## 2020-07-14 ENCOUNTER — OFFICE VISIT (OUTPATIENT)
Dept: INTERNAL MEDICINE CLINIC | Age: 60
End: 2020-07-14

## 2020-07-14 VITALS
RESPIRATION RATE: 18 BRPM | WEIGHT: 242 LBS | HEIGHT: 62 IN | BODY MASS INDEX: 44.53 KG/M2 | OXYGEN SATURATION: 98 % | TEMPERATURE: 98.4 F | HEART RATE: 102 BPM | SYSTOLIC BLOOD PRESSURE: 136 MMHG | DIASTOLIC BLOOD PRESSURE: 82 MMHG

## 2020-07-14 DIAGNOSIS — I10 ESSENTIAL HYPERTENSION: ICD-10-CM

## 2020-07-14 DIAGNOSIS — L85.3 DRY SKIN: ICD-10-CM

## 2020-07-14 DIAGNOSIS — E03.9 ACQUIRED HYPOTHYROIDISM: ICD-10-CM

## 2020-07-14 DIAGNOSIS — E11.21 TYPE 2 DIABETES WITH NEPHROPATHY (HCC): Primary | ICD-10-CM

## 2020-07-14 DIAGNOSIS — E78.5 HYPERLIPIDEMIA LDL GOAL <100: ICD-10-CM

## 2020-07-14 DIAGNOSIS — M81.0 AGE-RELATED OSTEOPOROSIS WITHOUT CURRENT PATHOLOGICAL FRACTURE: ICD-10-CM

## 2020-07-14 DIAGNOSIS — L81.9 CHANGING PIGMENTED SKIN LESION: ICD-10-CM

## 2020-07-14 NOTE — PROGRESS NOTES
HPI:  Dhara Whitley is a 62y.o. year old female who returns to clinic today for follow up: Adult onset diabetes mellitus, depression, HTN and hypercholesterolemia.   1.  Cardiovascular in adult onset diabetes mellitus.: She reports taking medications as instructed, no medication side effects noted, The home BP readings outside of office have been not checking. Lt[place] and Lifestyle: sometimes follows a low fat low cholesterol diet, generally follows a low sodium diet.    2.  DM: followed by Dr Sb Burnett and last saw 5 months ago. BS running average am BS ranging 108 on 7 days average. She has decreased her insulin due to some low BS which has helped. She has freestyle [place].   States sometimes following diabetic diet.  Taking medication daily. Gilmer Escudero is followed by endocrine.  lab work requested  3. Depression: Well-controlled on current medication denies any sadness or suicidal ideation  4. Osteoporosis: she felt like she was pain in her teeth after starting fosamax which resolve after stopping fosamax. She is taking ca vit D in her diet and supplement. Exercise ellipitical and doing Noom. No falls. Current Outpatient Medications   Medication Sig Dispense Refill    losartan-hydroCHLOROthiazide (HYZAAR) 100-25 mg per tablet TAKE 1 TABLET BY MOUTH EVERY DAY 90 Tab 0    FLUoxetine (PROZAC) 20 mg capsule TAKE 3 TABLETS BY MOUTH EVERY DAY (Patient taking differently: 60 mg. daily) 270 Cap 1    rosuvastatin (CRESTOR) 20 mg tablet TAKE 1 TABLET BY MOUTH EVERY DAY 90 Tab 1    levothyroxine (SYNTHROID) 50 mcg tablet TAKE 1 TABLET BY MOUTH EVERY DAY ON EMPTY STOMACH IN THE MORNING  2    amLODIPine (NORVASC) 10 mg tablet Take 1 Tab by mouth daily. 90 Tab 1    fluticasone (FLONASE ALLERGY RELIEF) 50 mcg/actuation nasal spray 2 Sprays by Both Nostrils route daily. 3 Bottle 1    metFORMIN ER (GLUCOPHAGE XR) 500 mg tablet Take 2 Tabs by mouth two (2) times a day. 360 Tab 1    Cetirizine (ZYRTEC) 10 mg cap Take  by mouth daily.  insulin CONCENTRATED regular (HUMULIN R U-500) 500 unit/mL soln 26 Units by SubCUTAneous route Before breakfast, lunch, and dinner. 19 units for dinner  Indications: 28 units breakfast and lunch, 20 units at dinner      cholecalciferol, vitamin D3, (VITAMIN D3) 2,000 unit tab Take  by mouth.  aspirin 81 mg Tab Take  by mouth. Allergies   Allergen Reactions    Tenormin [Atenolol] Other (comments)     depression    Vicodin [Hydrocodone-Acetaminophen] Nausea and Vomiting     Social History     Tobacco Use    Smoking status: Former Smoker     Packs/day: 2.00     Years: 20.00     Pack years: 40.00     Last attempt to quit: 1992     Years since quittin.1    Smokeless tobacco: Never Used   Substance Use Topics    Alcohol use: Yes     Comment: rarely         ROS  Physical Exam  Vitals signs and nursing note reviewed. Constitutional:       General: She is not in acute distress. Appearance: She is well-developed. HENT:      Head: Normocephalic and atraumatic. Neck:      Vascular: No carotid bruit. Cardiovascular:      Rate and Rhythm: Normal rate and regular rhythm. Heart sounds: Normal heart sounds. No murmur. Pulmonary:      Effort: Pulmonary effort is normal.      Breath sounds: Normal breath sounds. No wheezing. Abdominal:      General: Bowel sounds are normal. There is no distension. Palpations: Abdomen is soft. There is no mass. Tenderness: There is no abdominal tenderness. Musculoskeletal:      Right lower leg: No edema. Left lower leg: No edema. Skin:     Comments: 2 hyperpigmented irregular slightly raised papules left leg. Monofilament intact bilaterally. Pulses 2+ bilaterally. No skin lesions or open wounds.      Visit Vitals  /82 (BP 1 Location: Right arm)   Pulse (!) 102   Temp 98.4 °F (36.9 °C) (Temporal)   Resp 18   Ht 5' 2\" (1.575 m)   Wt 242 lb (109.8 kg)   LMP 1998   SpO2 98%   BMI 44.26 kg/m²       Assessment & Plan: ICD-10-CM ICD-9-CM    1. Type 2 diabetes with nephropathy (HCC) BS improved with lifestyle changes. Check lab work. Also followed by Dr Misti Vazquez and reviewed with her to follow up with endocrine. Continue current medication. E11.21 250.40 HEMOGLOBIN A1C WITH EAG     583.81 MICROALBUMIN, UR, RAND W/ MICROALB/CREAT RATIO   2. Essential hypertension   Well controlled, continue current medication. I10 401.9    3. Hyperlipidemia LDL goal <100   Continue current plan and check lab work. E78.5 272.4 CBC WITH AUTOMATED DIFF      LIPID PANEL      METABOLIC PANEL, COMPREHENSIVE   4. Acquired hypothyroidism   Continue current plan and check lab work. E03.9 244.9 TSH 3RD GENERATION      T4, FREE   5. Dry skin   Counseled  L85.3 701.1    6. Age-related osteoporosis without current pathological fracture   She was not able to tolerate Fosamax as she felt it was causing her some jaw/tooth pain. Reviewed importance of continue with calcium vitamin D and diet and supplementing as needed. Continue with exercise regimen. Have asked her to discuss her next treatment options with her endocrinologist.  We discussed possibly starting Prolia. She is due for her next bone density in the fall 2021. M81.0 733.01    7. Changing pigmented skin lesion   Referred to dermatology L81.9 216.9      8. Depression well-controlled continue current medication. has cologuard kit at home and will do and send. follow up 6 months   changing mole  Advised her to call back or return to office if symptoms worsen/change/persist.  Discussed expected course/resolution/complications of diagnosis in detail with patient. Medication risks/benefits/costs/interactions/alternatives discussed with patient. She was given an after visit summary which includes diagnoses, current medications, & vitals. She expressed understanding with the diagnosis and plan.

## 2020-07-22 LAB
ALBUMIN SERPL-MCNC: 4.6 G/DL (ref 3.8–4.8)
ALBUMIN/GLOB SERPL: 2.1 {RATIO} (ref 1.2–2.2)
ALP SERPL-CCNC: 110 IU/L (ref 39–117)
ALT SERPL-CCNC: 42 IU/L (ref 0–32)
AST SERPL-CCNC: 48 IU/L (ref 0–40)
BASOPHILS # BLD AUTO: 0.1 X10E3/UL (ref 0–0.2)
BASOPHILS NFR BLD AUTO: 1 %
BILIRUB SERPL-MCNC: 0.2 MG/DL (ref 0–1.2)
BUN SERPL-MCNC: 12 MG/DL (ref 8–27)
BUN/CREAT SERPL: 20 (ref 12–28)
CALCIUM SERPL-MCNC: 10.1 MG/DL (ref 8.7–10.3)
CHLORIDE SERPL-SCNC: 98 MMOL/L (ref 96–106)
CHOLEST SERPL-MCNC: 133 MG/DL (ref 100–199)
CO2 SERPL-SCNC: 22 MMOL/L (ref 20–29)
CREAT SERPL-MCNC: 0.6 MG/DL (ref 0.57–1)
EOSINOPHIL # BLD AUTO: 0.2 X10E3/UL (ref 0–0.4)
EOSINOPHIL NFR BLD AUTO: 2 %
ERYTHROCYTE [DISTWIDTH] IN BLOOD BY AUTOMATED COUNT: 13.8 % (ref 11.7–15.4)
EST. AVERAGE GLUCOSE BLD GHB EST-MCNC: 163 MG/DL
GLOBULIN SER CALC-MCNC: 2.2 G/DL (ref 1.5–4.5)
GLUCOSE SERPL-MCNC: 80 MG/DL (ref 65–99)
HBA1C MFR BLD: 7.3 % (ref 4.8–5.6)
HCT VFR BLD AUTO: 37 % (ref 34–46.6)
HDLC SERPL-MCNC: 42 MG/DL
HGB BLD-MCNC: 12.6 G/DL (ref 11.1–15.9)
IMM GRANULOCYTES # BLD AUTO: 0 X10E3/UL (ref 0–0.1)
IMM GRANULOCYTES NFR BLD AUTO: 1 %
LDLC SERPL CALC-MCNC: 48 MG/DL (ref 0–99)
LYMPHOCYTES # BLD AUTO: 3.1 X10E3/UL (ref 0.7–3.1)
LYMPHOCYTES NFR BLD AUTO: 41 %
MCH RBC QN AUTO: 29.3 PG (ref 26.6–33)
MCHC RBC AUTO-ENTMCNC: 34.1 G/DL (ref 31.5–35.7)
MCV RBC AUTO: 86 FL (ref 79–97)
MONOCYTES # BLD AUTO: 0.5 X10E3/UL (ref 0.1–0.9)
MONOCYTES NFR BLD AUTO: 7 %
NEUTROPHILS # BLD AUTO: 3.8 X10E3/UL (ref 1.4–7)
NEUTROPHILS NFR BLD AUTO: 48 %
PLATELET # BLD AUTO: 384 X10E3/UL (ref 150–450)
POTASSIUM SERPL-SCNC: 4.4 MMOL/L (ref 3.5–5.2)
PROT SERPL-MCNC: 6.8 G/DL (ref 6–8.5)
RBC # BLD AUTO: 4.3 X10E6/UL (ref 3.77–5.28)
SODIUM SERPL-SCNC: 140 MMOL/L (ref 134–144)
SPECIMEN STATUS REPORT, ROLRST: NORMAL
T4 FREE SERPL-MCNC: 1 NG/DL (ref 0.82–1.77)
TRIGL SERPL-MCNC: 214 MG/DL (ref 0–149)
TSH SERPL DL<=0.005 MIU/L-ACNC: 3.23 UIU/ML (ref 0.45–4.5)
VLDLC SERPL CALC-MCNC: 43 MG/DL (ref 5–40)
WBC # BLD AUTO: 7.7 X10E3/UL (ref 3.4–10.8)

## 2020-12-31 RX ORDER — FLUOXETINE HYDROCHLORIDE 20 MG/1
CAPSULE ORAL
Qty: 270 CAP | Refills: 1 | OUTPATIENT
Start: 2020-12-31

## 2020-12-31 NOTE — TELEPHONE ENCOUNTER
Requested Prescriptions     Pending Prescriptions Disp Refills    FLUoxetine (PROzac) 20 mg capsule 270 Cap 1     Sig: TAKE 3 CAPS BY MOUTH EVERY DAY         Requested Quantity : 270.0        [facility]/pharmacy #[id number]- [place], [place] - [facility] AT [address] [place] [address] [phone]

## 2021-01-05 RX ORDER — FLUOXETINE HYDROCHLORIDE 20 MG/1
CAPSULE ORAL
Qty: 270 CAP | Refills: 0 | Status: SHIPPED | OUTPATIENT
Start: 2021-01-05 | End: 2021-04-27

## 2021-02-02 ENCOUNTER — OFFICE VISIT (OUTPATIENT)
Dept: INTERNAL MEDICINE CLINIC | Age: 61
End: 2021-02-02
Payer: MEDICAID

## 2021-02-02 VITALS
HEART RATE: 92 BPM | WEIGHT: 234 LBS | OXYGEN SATURATION: 97 % | TEMPERATURE: 97.6 F | DIASTOLIC BLOOD PRESSURE: 73 MMHG | SYSTOLIC BLOOD PRESSURE: 119 MMHG | BODY MASS INDEX: 43.06 KG/M2 | RESPIRATION RATE: 16 BRPM | HEIGHT: 62 IN

## 2021-02-02 DIAGNOSIS — E78.5 HYPERLIPIDEMIA LDL GOAL <100: ICD-10-CM

## 2021-02-02 DIAGNOSIS — F32.5 MAJOR DEPRESSIVE DISORDER WITH SINGLE EPISODE, IN FULL REMISSION (HCC): ICD-10-CM

## 2021-02-02 DIAGNOSIS — Z79.4 TYPE 2 DIABETES MELLITUS WITH DIABETIC NEUROPATHY, WITH LONG-TERM CURRENT USE OF INSULIN (HCC): Primary | ICD-10-CM

## 2021-02-02 DIAGNOSIS — Z13.0 SCREENING FOR DEFICIENCY ANEMIA: ICD-10-CM

## 2021-02-02 DIAGNOSIS — Z12.11 SCREEN FOR COLON CANCER: ICD-10-CM

## 2021-02-02 DIAGNOSIS — E11.40 TYPE 2 DIABETES MELLITUS WITH DIABETIC NEUROPATHY, WITH LONG-TERM CURRENT USE OF INSULIN (HCC): Primary | ICD-10-CM

## 2021-02-02 PROCEDURE — 99214 OFFICE O/P EST MOD 30 MIN: CPT | Performed by: INTERNAL MEDICINE

## 2021-02-02 NOTE — PROGRESS NOTES
Frankie Reyes is a 56 y.o. female who was seen today for a follow-up visit. Assessment & Plan:   Diagnoses and all orders for this visit:    1. Type 2 diabetes mellitus with diabetic neuropathy, with long-term current use of insulin (HCC) at goal.  Continue with current medications and diabetic diet. -     METABOLIC PANEL, COMPREHENSIVE; Future  -     MICROALBUMIN, UR, RAND W/ MICROALB/CREAT RATIO; Future    2. Hyperlipidemia LDL goal <100  Continue with weight loss. Continue with current medications  -     LIPID PANEL; Future  -     METABOLIC PANEL, COMPREHENSIVE; Future    3. Screen for colon cancer  -     COLOGUARD TEST (FECAL DNA COLORECTAL CANCER SCREENING)    4. Screening for deficiency anemia  -     CBC WITH AUTOMATED DIFF; Future  5. Depression: Well-controlled on current medications. current treatment plan is effective, no change in therapy  lab results and schedule of future lab studies reviewed with patient. Subjective: Frankie Reyes was seen for Hypertension  Adult onset diabetes mellitus, depression, HTN and hypercholesterolemia.   1.  Cardiovascular.: She reports taking medications as instructed, no medication side effects noted, The home BP readings outside of office have been not checking. [employer] and Lifestyle: sometimes follows a low fat low cholesterol diet, generally follows a low sodium diet.  recent a1c 6.8. 2.  DM: has lost at least 8 pounds over past 6 months. followed by Dr Kwesi Martinez She has freestyle Sarath.  average.   States sometimes following diabetic diet.  Taking medication daily.  lab work requested  3.  Depression: Well-controlled on current medication denies any sadness or suicidal ideation  4. Osteoporosis: not able to tolerate fosamax. She is taking ca vit D in her diet and supplement. Exercise cardio and resistance training 4 x a week with her cousin via virtual.  No falls. Review of Systems   Respiratory: Negative for shortness of breath. Cardiovascular: Negative for chest pain and leg swelling. Gastrointestinal: Negative for abdominal pain. - per HPI    Physical Exam  Vitals signs and nursing note reviewed. Constitutional:       General: She is not in acute distress. Appearance: She is well-developed. HENT:      Head: Normocephalic and atraumatic. Cardiovascular:      Rate and Rhythm: Normal rate and regular rhythm. Pulmonary:      Effort: Pulmonary effort is normal.      Breath sounds: Normal breath sounds. Abdominal:      General: Bowel sounds are normal.      Palpations: Abdomen is soft. Tenderness: There is no abdominal tenderness. Musculoskeletal:      Right lower leg: No edema. Left lower leg: No edema. Psychiatric:         Mood and Affect: Mood normal.       Visit Vitals  /73 (BP 1 Location: Left upper arm, BP Patient Position: Sitting)   Pulse 92   Temp 97.6 °F (36.4 °C) (Temporal)   Resp 16   Ht 5' 2\" (1.575 m)   Wt 234 lb (106.1 kg)   LMP 06/01/1998   SpO2 97%   BMI 42.80 kg/m²          Aspects of this note may have been generated using voice recognition software. Despite editing, there may be some syntax errors   I have discussed the diagnosis with the patient and the intended plan as seen in the above orders. The patient has received an after-visit summary and questions were answered concerning future plans. I have discussed any recommended medication side effects and warnings with the patient as well.   She has expressed understanding of the diagnosis and plan    Emigdio Mackey MD

## 2021-02-03 LAB
HBA1C MFR BLD HPLC: 6.8 %
TSH SERPL-ACNC: 2.98 M[IU]/L

## 2021-04-27 RX ORDER — FLUOXETINE HYDROCHLORIDE 20 MG/1
CAPSULE ORAL
Qty: 90 CAP | Refills: 2 | Status: SHIPPED | OUTPATIENT
Start: 2021-04-27 | End: 2021-07-26 | Stop reason: SDUPTHER

## 2021-04-27 RX ORDER — LOSARTAN POTASSIUM AND HYDROCHLOROTHIAZIDE 25; 100 MG/1; MG/1
TABLET ORAL
Qty: 90 TAB | Refills: 0 | Status: SHIPPED | OUTPATIENT
Start: 2021-04-27 | End: 2021-08-10 | Stop reason: SDUPTHER

## 2021-04-27 RX ORDER — ROSUVASTATIN CALCIUM 20 MG/1
TABLET, COATED ORAL
Qty: 30 TAB | Refills: 5 | Status: SHIPPED | OUTPATIENT
Start: 2021-04-27 | End: 2021-11-29 | Stop reason: SDUPTHER

## 2021-05-24 RX ORDER — FLUOXETINE HYDROCHLORIDE 20 MG/1
CAPSULE ORAL
Qty: 90 CAPSULE | Refills: 2 | OUTPATIENT
Start: 2021-05-24

## 2021-05-24 NOTE — TELEPHONE ENCOUNTER
Requested Prescriptions     Pending Prescriptions Disp Refills    FLUoxetine (PROzac) 20 mg capsule 90 Capsule 2     Sig: TAKE 3 CAPSULES BY MOUTH EVERY DAY       [facility]/pharmacy #[id number]- [place], [place] - [address] AT [facility] [phone]

## 2021-05-26 RX ORDER — FLUOXETINE HYDROCHLORIDE 20 MG/1
CAPSULE ORAL
Qty: 90 CAPSULE | Refills: 2 | OUTPATIENT
Start: 2021-05-26

## 2021-05-26 NOTE — TELEPHONE ENCOUNTER
Requested Prescriptions     Pending Prescriptions Disp Refills    FLUoxetine (PROzac) 20 mg capsule 90 Capsule 2     Sig: TAKE 3 CAPSULES BY MOUTH EVERY DAY             Requested Quantity : 270.0        [facility]/pharmacy #[id number]- [place], [address] AT [facility] [phone]

## 2021-06-25 DIAGNOSIS — I10 ESSENTIAL HYPERTENSION: Primary | ICD-10-CM

## 2021-06-25 RX ORDER — AMLODIPINE BESYLATE 10 MG/1
TABLET ORAL
Qty: 90 TABLET | Refills: 0 | Status: SHIPPED | OUTPATIENT
Start: 2021-06-25 | End: 2021-07-30 | Stop reason: SDUPTHER

## 2021-07-26 RX ORDER — FLUOXETINE HYDROCHLORIDE 20 MG/1
CAPSULE ORAL
Qty: 90 CAPSULE | Refills: 0 | Status: SHIPPED | OUTPATIENT
Start: 2021-07-26 | End: 2021-07-30 | Stop reason: SDUPTHER

## 2021-07-26 NOTE — TELEPHONE ENCOUNTER
Pharmacy is requesting a quantity change of 270.0    Requested Prescriptions     Pending Prescriptions Disp Refills    FLUoxetine (PROzac) 20 mg capsule 90 Capsule 2     Sig: TAKE 3 CAPSULES BY MOUTH EVERY DAY             [facility]/pharm[facility] #[id number]- [place], [place] - [address] AT [facility] [phone]

## 2021-07-30 ENCOUNTER — OFFICE VISIT (OUTPATIENT)
Dept: INTERNAL MEDICINE CLINIC | Age: 61
End: 2021-07-30
Payer: MEDICAID

## 2021-07-30 VITALS
HEART RATE: 95 BPM | DIASTOLIC BLOOD PRESSURE: 72 MMHG | SYSTOLIC BLOOD PRESSURE: 120 MMHG | HEIGHT: 62 IN | WEIGHT: 220.8 LBS | TEMPERATURE: 97.8 F | BODY MASS INDEX: 40.63 KG/M2 | OXYGEN SATURATION: 98 %

## 2021-07-30 DIAGNOSIS — I10 ESSENTIAL HYPERTENSION: Primary | ICD-10-CM

## 2021-07-30 DIAGNOSIS — F32.5 MAJOR DEPRESSIVE DISORDER WITH SINGLE EPISODE, IN FULL REMISSION (HCC): ICD-10-CM

## 2021-07-30 DIAGNOSIS — E78.5 HYPERLIPIDEMIA LDL GOAL <100: ICD-10-CM

## 2021-07-30 DIAGNOSIS — Z79.4 TYPE 2 DIABETES MELLITUS WITH DIABETIC NEUROPATHY, WITH LONG-TERM CURRENT USE OF INSULIN (HCC): ICD-10-CM

## 2021-07-30 DIAGNOSIS — E11.40 TYPE 2 DIABETES MELLITUS WITH DIABETIC NEUROPATHY, WITH LONG-TERM CURRENT USE OF INSULIN (HCC): ICD-10-CM

## 2021-07-30 PROCEDURE — 99214 OFFICE O/P EST MOD 30 MIN: CPT | Performed by: INTERNAL MEDICINE

## 2021-07-30 RX ORDER — KETOTIFEN FUMARATE 0.35 MG/ML
1 SOLUTION/ DROPS OPHTHALMIC AS NEEDED
COMMUNITY

## 2021-07-30 RX ORDER — FLUOXETINE HYDROCHLORIDE 20 MG/1
CAPSULE ORAL
Qty: 270 CAPSULE | Refills: 1 | Status: SHIPPED | OUTPATIENT
Start: 2021-07-30 | End: 2022-04-19

## 2021-07-30 RX ORDER — AMLODIPINE BESYLATE 5 MG/1
5 TABLET ORAL DAILY
Qty: 1 TABLET | Refills: 0
Start: 2021-07-30 | End: 2021-09-27 | Stop reason: SDUPTHER

## 2021-07-30 NOTE — PROGRESS NOTES
Edgar Gant is a 56 y.o. female who was seen today for a follow-up visit. Assessment & Plan:       ICD-10-CM ICD-9-CM    1. Essential hypertension   Blood pressures been running lower with weight loss. We will do a trial of decrease amlodipine to 5 mg and will check bp at home and send for review. Otherwise Continue current medication. I10 401.9    2. Hyperlipidemia LDL goal <100   Well controlled, continue current medication. E78.5 272.4    3. Type 2 diabetes mellitus with diabetic neuropathy, with long-term current use of insulin (HCC)   Continue current medication. Recheck levels. Also  Managed by endocrine  E11.40 250.60 HEMOGLOBIN A1C WITH EAG    Y82.6 931.1 METABOLIC PANEL, COMPREHENSIVE     V58.67 HEMOGLOBIN A1C WITH EAG      METABOLIC PANEL, COMPREHENSIVE   4. Major depressive disorder with si  Well controlled, continue current medication. F32.5 296.26    5. Obesity: Continue with weight loss strategies that she has been very successful with.      lab results and schedule of future lab studies reviewed with patient. follow up 6 months  Subjective: Edgar Gant was seen for Diabetes  Adult onset diabetes mellitus, depression, HTN and hypercholesterolemia.   1.  Cardiovascular.:  He has lost an additional 14 pounds over the past 6 months with exercise and dietary changes. She reports taking medications as instructed, no medication side effects noted, The home BP readings outside of office have been not checking. Ernesto Isbell and Lifestyle: sometimes follows a low fat low cholesterol diet, generally follows a low sodium diet. Exercise: working out 4 x a week. 2.  DM: followed by Dr Jase Schwab has freestyle Sarath.  30 day Average.  Has has some low BS but has decreased her insulin which helped.    following diabetic diet.  Taking medication daily.  lab work requested  3.  Depression: Well-controlled on current medication denies any sadness or suicidal ideation    Review of Systems Respiratory: Negative for shortness of breath. Cardiovascular: Negative for chest pain and leg swelling. Gastrointestinal: Negative for abdominal pain. Physical Exam  Vitals and nursing note reviewed. Constitutional:       General: She is not in acute distress. Appearance: She is well-developed. HENT:      Head: Normocephalic and atraumatic. Cardiovascular:      Rate and Rhythm: Normal rate and regular rhythm. Pulmonary:      Effort: Pulmonary effort is normal.      Breath sounds: Normal breath sounds. Abdominal:      General: Bowel sounds are normal.      Palpations: Abdomen is soft. Tenderness: There is no abdominal tenderness. Musculoskeletal:      Right lower leg: No edema. Left lower leg: No edema. Psychiatric:         Mood and Affect: Mood normal.     Monofilament intact bilaterally. Pulses 2+ bilaterally. Callus present No open wounds. Visit Vitals  /67 (BP 1 Location: Left upper arm, BP Patient Position: Sitting, BP Cuff Size: Large adult)   Pulse 95   Temp 97.8 °F (36.6 °C) (Temporal)   Ht 5' 2\" (1.575 m)   Wt 220 lb 12.8 oz (100.2 kg)   LMP 06/01/1998   SpO2 98%   BMI 40.38 kg/m²        Aspects of this note may have been generated using voice recognition software. Despite editing, there may be some syntax errors   I have discussed the diagnosis with the patient and the intended plan as seen in the above orders. The patient has received an after-visit summary and questions were answered concerning future plans. I have discussed any recommended medication side effects and warnings with the patient as well.   She has expressed understanding of the diagnosis and plan    Ally Bowser MD

## 2021-07-30 NOTE — LETTER
8/5/2021 1:10 PM    Ms. Larry Moore  [facility] [id number]      Ms. Phipps,    Below you will see your most recent lab results. Office Visit on 07/30/2021   Component Date Value Ref Range Status    Hemoglobin A1c 07/30/2021 7.4* 4.8 - 5.6 % Final    Comment:          Prediabetes: 5.7 - 6.4           Diabetes: >6.4           Glycemic control for adults with diabetes: <7.0      Estimated average glucose 07/30/2021 166  mg/dL Final    Glucose 07/30/2021 200* 65 - 99 mg/dL Final    BUN 07/30/2021 14  8 - 27 mg/dL Final    Creatinine 07/30/2021 0.62  0.57 - 1.00 mg/dL Final    GFR est non-AA 07/30/2021 97  >59 mL/min/1.73 Final    GFR est AA 07/30/2021 112  >59 mL/min/1.73 Final    Comment: **[facility] currently reports eGFR in compliance with the current**    recommendations of the [employer]. Heraclio Martinez will    update reporting as new guidelines are published from the NKF-ASN    Task force.  BUN/Creatinine ratio 07/30/2021 23  12 - 28 Final    Sodium 07/30/2021 138  134 - 144 mmol/L Final    Potassium 07/30/2021 4.4  3.5 - 5.2 mmol/L Final    Chloride 07/30/2021 99  96 - 106 mmol/L Final    CO2 07/30/2021 23  20 - 29 mmol/L Final    Calcium 07/30/2021 9.6  8.7 - 10.3 mg/dL Final    Protein, total 07/30/2021 6.4  6.0 - 8.5 g/dL Final    Albumin 07/30/2021 4.4  3.8 - 4.8 g/dL Final    GLOBULIN, TOTAL 07/30/2021 2.0  1.5 - 4.5 g/dL Final    A-G Ratio 07/30/2021 2.2  1.2 - 2.2 Final    Bilirubin, total 07/30/2021 <0.2  0.0 - 1.2 mg/dL Final    Alk.  phosphatase 07/30/2021 112  48 - 121 IU/L Final    AST (SGOT) 07/30/2021 19  0 - 40 IU/L Final    ALT (SGPT) 07/30/2021 21  0 - 32 IU/L Final     Recommendations: Overall lab work looks good.  Your hemoglobin A1c is 7.4.  Our goal is for your hemoglobin A1c to be less than 7.  You are very close.  Continue with your current regimen of medications, diabetic diet, exercise and weight loss.  Keep up the good work.      Sincerely,      Esteban Man MD

## 2021-07-31 LAB
ALBUMIN SERPL-MCNC: 4.4 G/DL (ref 3.8–4.8)
ALBUMIN/GLOB SERPL: 2.2 {RATIO} (ref 1.2–2.2)
ALP SERPL-CCNC: 112 IU/L (ref 48–121)
ALT SERPL-CCNC: 21 IU/L (ref 0–32)
AST SERPL-CCNC: 19 IU/L (ref 0–40)
BILIRUB SERPL-MCNC: <0.2 MG/DL (ref 0–1.2)
BUN SERPL-MCNC: 14 MG/DL (ref 8–27)
BUN/CREAT SERPL: 23 (ref 12–28)
CALCIUM SERPL-MCNC: 9.6 MG/DL (ref 8.7–10.3)
CHLORIDE SERPL-SCNC: 99 MMOL/L (ref 96–106)
CO2 SERPL-SCNC: 23 MMOL/L (ref 20–29)
CREAT SERPL-MCNC: 0.62 MG/DL (ref 0.57–1)
EST. AVERAGE GLUCOSE BLD GHB EST-MCNC: 166 MG/DL
GLOBULIN SER CALC-MCNC: 2 G/DL (ref 1.5–4.5)
GLUCOSE SERPL-MCNC: 200 MG/DL (ref 65–99)
HBA1C MFR BLD: 7.4 % (ref 4.8–5.6)
POTASSIUM SERPL-SCNC: 4.4 MMOL/L (ref 3.5–5.2)
PROT SERPL-MCNC: 6.4 G/DL (ref 6–8.5)
SODIUM SERPL-SCNC: 138 MMOL/L (ref 134–144)

## 2021-08-10 DIAGNOSIS — I10 ESSENTIAL HYPERTENSION: Primary | ICD-10-CM

## 2021-08-10 RX ORDER — LOSARTAN POTASSIUM AND HYDROCHLOROTHIAZIDE 25; 100 MG/1; MG/1
TABLET ORAL
Qty: 90 TABLET | Refills: 0 | Status: SHIPPED | OUTPATIENT
Start: 2021-08-10 | End: 2021-11-03

## 2021-09-27 DIAGNOSIS — I10 ESSENTIAL HYPERTENSION: ICD-10-CM

## 2021-09-27 RX ORDER — AMLODIPINE BESYLATE 5 MG/1
5 TABLET ORAL DAILY
Qty: 90 TABLET | Refills: 0 | Status: SHIPPED | OUTPATIENT
Start: 2021-09-27 | End: 2021-12-15

## 2021-11-03 DIAGNOSIS — I10 ESSENTIAL HYPERTENSION: ICD-10-CM

## 2021-11-03 RX ORDER — LOSARTAN POTASSIUM AND HYDROCHLOROTHIAZIDE 25; 100 MG/1; MG/1
TABLET ORAL
Qty: 30 TABLET | Refills: 2 | Status: SHIPPED | OUTPATIENT
Start: 2021-11-03 | End: 2022-01-10 | Stop reason: SDUPTHER

## 2021-11-29 DIAGNOSIS — E78.5 HYPERLIPIDEMIA LDL GOAL <100: Primary | ICD-10-CM

## 2021-11-29 NOTE — TELEPHONE ENCOUNTER
Requested Prescriptions     Pending Prescriptions Disp Refills    rosuvastatin (CRESTOR) 20 mg tablet 30 Tablet 5     Sig: Take 1 Tablet by mouth daily.      [facility]/pharmacy #[id number]- [place], [facility] [facility] [phone]

## 2021-11-30 RX ORDER — ROSUVASTATIN CALCIUM 20 MG/1
20 TABLET, COATED ORAL DAILY
Qty: 30 TABLET | Refills: 1 | Status: SHIPPED | OUTPATIENT
Start: 2021-11-30 | End: 2021-12-23

## 2021-12-11 LAB — HBA1C MFR BLD HPLC: 8.2 %

## 2022-01-07 ENCOUNTER — TELEPHONE (OUTPATIENT)
Dept: INTERNAL MEDICINE CLINIC | Age: 62
End: 2022-01-07

## 2022-01-07 NOTE — TELEPHONE ENCOUNTER
Left Message for a return phone call. Received fax from pharmacy [facility] on [address]. Can send to another pharmacy.

## 2022-01-10 DIAGNOSIS — I10 ESSENTIAL HYPERTENSION: ICD-10-CM

## 2022-01-10 RX ORDER — LOSARTAN POTASSIUM AND HYDROCHLOROTHIAZIDE 25; 100 MG/1; MG/1
1 TABLET ORAL DAILY
Qty: 30 TABLET | Refills: 2 | Status: SHIPPED | OUTPATIENT
Start: 2022-01-10 | End: 2022-01-11 | Stop reason: SDUPTHER

## 2022-01-10 NOTE — TELEPHONE ENCOUNTER
----- Message from Negrita Baca sent at 1/10/2022  9:48 AM EST -----  Regarding: Losartan Hctz 100-25  Please send prescription to [facility]ila at [facility]. They have it in stock right now. Thanks!

## 2022-01-11 RX ORDER — LOSARTAN POTASSIUM AND HYDROCHLOROTHIAZIDE 25; 100 MG/1; MG/1
1 TABLET ORAL DAILY
Qty: 30 TABLET | Refills: 2 | Status: SHIPPED | OUTPATIENT
Start: 2022-01-11 | End: 2022-03-24

## 2022-01-20 ENCOUNTER — OFFICE VISIT (OUTPATIENT)
Dept: INTERNAL MEDICINE CLINIC | Age: 62
End: 2022-01-20
Payer: MEDICAID

## 2022-01-20 VITALS
OXYGEN SATURATION: 98 % | HEART RATE: 87 BPM | BODY MASS INDEX: 40.59 KG/M2 | HEIGHT: 62 IN | RESPIRATION RATE: 16 BRPM | WEIGHT: 220.6 LBS | DIASTOLIC BLOOD PRESSURE: 84 MMHG | TEMPERATURE: 97.5 F | SYSTOLIC BLOOD PRESSURE: 132 MMHG

## 2022-01-20 DIAGNOSIS — M81.0 AGE-RELATED OSTEOPOROSIS WITHOUT CURRENT PATHOLOGICAL FRACTURE: ICD-10-CM

## 2022-01-20 DIAGNOSIS — E78.5 HYPERLIPIDEMIA LDL GOAL <100: ICD-10-CM

## 2022-01-20 DIAGNOSIS — E11.40 TYPE 2 DIABETES MELLITUS WITH DIABETIC NEUROPATHY, WITH LONG-TERM CURRENT USE OF INSULIN (HCC): ICD-10-CM

## 2022-01-20 DIAGNOSIS — Z13.0 SCREENING FOR DEFICIENCY ANEMIA: ICD-10-CM

## 2022-01-20 DIAGNOSIS — F32.5 MAJOR DEPRESSIVE DISORDER WITH SINGLE EPISODE, IN FULL REMISSION (HCC): ICD-10-CM

## 2022-01-20 DIAGNOSIS — I10 ESSENTIAL HYPERTENSION: ICD-10-CM

## 2022-01-20 DIAGNOSIS — E66.01 MORBID OBESITY (HCC): ICD-10-CM

## 2022-01-20 DIAGNOSIS — Z00.00 ROUTINE GENERAL MEDICAL EXAMINATION AT A HEALTH CARE FACILITY: Primary | ICD-10-CM

## 2022-01-20 DIAGNOSIS — Z79.4 TYPE 2 DIABETES MELLITUS WITH DIABETIC NEUROPATHY, WITH LONG-TERM CURRENT USE OF INSULIN (HCC): ICD-10-CM

## 2022-01-20 DIAGNOSIS — E11.21 TYPE 2 DIABETES WITH NEPHROPATHY (HCC): ICD-10-CM

## 2022-01-20 DIAGNOSIS — G47.33 OBSTRUCTIVE SLEEP APNEA SYNDROME: ICD-10-CM

## 2022-01-20 LAB
CHOLEST SERPL-MCNC: 116 MG/DL
CREATININE, EXTERNAL: 0.48
HDL, EXTERNAL: 41
LDL-C, EXTERNAL: 52
TRIGLYCERIDES, EXTERNAL: 128
TSH SERPL-ACNC: 3.61 M[IU]/L
VLDLC SERPL CALC-MCNC: 23 MG/DL

## 2022-01-20 PROCEDURE — 99396 PREV VISIT EST AGE 40-64: CPT | Performed by: INTERNAL MEDICINE

## 2022-01-20 NOTE — PROGRESS NOTES
Cris Nelson is a 59 y.o. female who was seen in clinic today (1/20/2022) for a full physical.       Assessment & Plan:   Below is the assessment and plan developed based on review of pertinent history, physical exam, labs, studies, and medications. 1. Routine general medical examination at a health care facility  Health maintenance reviewed and updated with patient today at visit. 2. Type 2 diabetes mellitus with diabetic neuropathy, with long-term current use of insulin ([facility] [address]  Assessment & Plan:   A1c not at goal and followed by endocrinology. Recently had increase in her insulin. And has upcoming follow-up in March for repeat lab work with her endocrinologist.   Pema Odom regarding working on diabetic diet. Exercise. Orders:  -     MICROALBUMIN, UR, RAND W/ MICROALB/CREAT RATIO; Future  3. Type 2 diabetes with nephropathy ([facility] [address]  Assessment & Plan:   Continue as above  Orders:  -     MICROALBUMIN, UR, RAND W/ MICROALB/CREAT RATIO; Future  4. Essential hypertension  Assessment & Plan:   borderline controlled, continue current medications work on lifestyle changes. 5. Age-related osteoporosis without current pathological fracture  -     DEXA BONE DENSITY STUDY AXIAL; Future  6. Screening for deficiency anemia  -     CBC WITH AUTOMATED DIFF; Future  7. Hyperlipidemia LDL goal <100  8. Obstructive sleep apnea syndrome  Assessment & Plan:   monitored by specialist. No acute findings meriting change in the plan   Continue with CPAP. 9. Morbid obesity ([facility] [address]  Assessment & Plan:   Continue with weight loss strategies and these are reviewed today. 10. Major depressive disorder with single episode, in full remission [facility])  Assessment & Plan:   well controlled, continue current medications     Follow-up 6 months  Subjective: Ria Justin is here today for a full physical.      Since last visit: Has been doing overall well. She is also here for follow-up of chronic medical conditions.   Taking medications with no side effects. Reviewed labs from endo with her and a1c increased to 8.2 and she reports has not been exercising as much and was eating diet higher in sugar and carbs. Dr Gael Villalta increased insulin. BS running average 150-160's. Osteoporosis: not able to tolerate fosamax. Only feels down when she is not getting sunlight everyday. Health Maintenance  Immunizations:   Covid: up to date  Influenza: up to date   Tetanus: up to date      Cancer screening:   S/P hyst and bilateral mastectoomy. Colon cancer screen UTD     The following sections were reviewed & updated as appropriate: Problem List, Allergies, PMH, PSH, FH, and SH. Prior to Admission medications    Medication Sig Start Date End Date Taking? Authorizing Provider   losartan-hydroCHLOROthiazide (HYZAAR) 100-25 mg per tablet Take 1 Tablet by mouth daily. 1/11/22  Yes Dominique Gamnio MD   rosuvastatin (CRESTOR) 20 mg tablet TAKE 1 TABLET BY MOUTH EVERY DAY 12/23/21  Yes Dominique Gamino MD   amLODIPine (NORVASC) 5 mg tablet TAKE 1 TABLET BY MOUTH DAILY. THIS IS A DOSE CHANGE 12/15/21  Yes Dominique Gamino MD   ketotifen (ZADITOR) 0.025 % (0.035 %) ophthalmic solution Administer 1 Drop to both eyes two (2) times a day. Yes Provider, Historical   FLUoxetine (PROzac) 20 mg capsule TAKE 3 CAPSULES BY MOUTH EVERY DAY 7/30/21  Yes Dominique Gamino MD   fluticasone [facility] ALLERGY RELIEF) 50 mcg/actuation nasal spray 2 Sprays by Both Nostrils route daily. 1/9/19  Yes Dominique Gamino MD   metFORMIN ER (GLUCOPHAGE XR) 500 mg tablet Take 2 Tabs by mouth two (2) times a day. 12/3/18  Yes Dominique Gamino MD   Cetirizine (ZYRTEC) 10 mg cap Take  by mouth daily. Yes Provider, Historical   insulin CONCENTRATED regular (HUMULIN R U-500) 500 unit/mL soln by SubCUTAneous route.  65 units before breastfast and lunch, 35 units before bed  Indications: 28 units breakfast and lunch, 20 units at dinner   Yes Provider, Historical   cholecalciferol, vitamin D3, (VITAMIN D3) 2,000 unit tab Take  by mouth. Yes Provider, Historical   aspirin 81 mg Tab Take  by mouth. Yes Provider, Historical          Review of Systems   Constitutional: Negative for fever, malaise/fatigue and weight loss. HENT: Negative for congestion and sore throat. Eyes: Negative. Respiratory: Negative for cough and shortness of breath. Cardiovascular: Negative for chest pain and leg swelling. Gastrointestinal: Negative for abdominal pain, blood in stool, constipation, diarrhea, heartburn and nausea. Genitourinary: Negative for dysuria, frequency and urgency. Musculoskeletal: Negative for back pain and joint pain. Skin: Negative for rash. Neurological: Negative for dizziness, sensory change, focal weakness and headaches. Psychiatric/Behavioral: Negative for depression. Objective:   Physical Exam  Vitals and nursing note reviewed. Constitutional:       General: She is not in acute distress. Appearance: She is well-developed. HENT:      Head: Normocephalic and atraumatic. Right Ear: Tympanic membrane and ear canal normal.      Left Ear: Tympanic membrane and ear canal normal.      Nose: Nose normal.   Eyes:      Conjunctiva/sclera: Conjunctivae normal.      Pupils: Pupils are equal, round, and reactive to light. Neck:      Thyroid: No thyromegaly. Cardiovascular:      Rate and Rhythm: Normal rate and regular rhythm. Heart sounds: Normal heart sounds. No murmur heard. Pulmonary:      Effort: Pulmonary effort is normal.      Breath sounds: Normal breath sounds. Chest:      Comments: Breast exam bilateral mastectomy. Status post bilateral implants. No skin changes. No adenopathy. Abdominal:      General: Bowel sounds are normal.      Palpations: Abdomen is soft. There is no mass. Tenderness: There is no abdominal tenderness. Hernia: A hernia is present. Hernia is present in the umbilical area.    Musculoskeletal:      Cervical back: Normal range of motion. Lymphadenopathy:      Cervical: No cervical adenopathy. Skin:     Findings: No rash. Neurological:      Cranial Nerves: No cranial nerve deficit. Sensory: No sensory deficit. Monofilament intact bilaterally. Pulses 2+ bilaterally. Bilateral callus. No open wounds.      Visit Vitals  /84 (BP 1 Location: Left upper arm, BP Patient Position: Sitting, BP Cuff Size: Large adult)   Pulse 87   Temp 97.5 °F (36.4 °C) (Temporal)   Resp 16   Ht 5' 1.77\" (1.569 m)   Wt 220 lb 9.6 oz (100.1 kg)   LMP 06/01/1998   SpO2 98%   BMI 40.65 kg/m²          Zaki Mcnamara MD

## 2022-01-20 NOTE — ASSESSMENT & PLAN NOTE
A1c not at goal and followed by endocrinology. Recently had increase in her insulin. And has upcoming follow-up in March for repeat lab work with her endocrinologist.   Javier Acevedo regarding working on diabetic diet. Exercise.

## 2022-02-15 ENCOUNTER — HOSPITAL ENCOUNTER (OUTPATIENT)
Dept: BONE DENSITY | Age: 62
Discharge: HOME | End: 2022-02-15
Attending: INTERNAL MEDICINE
Payer: MEDICAID

## 2022-02-15 DIAGNOSIS — M81.0 AGE-RELATED OSTEOPOROSIS WITHOUT CURRENT PATHOLOGICAL FRACTURE: ICD-10-CM

## 2022-02-15 PROCEDURE — 77080 DXA BONE DENSITY AXIAL: CPT

## 2022-02-28 DIAGNOSIS — E78.5 HYPERLIPIDEMIA LDL GOAL <100: ICD-10-CM

## 2022-02-28 RX ORDER — ROSUVASTATIN CALCIUM 20 MG/1
TABLET, COATED ORAL
Qty: 30 TABLET | Refills: 1 | OUTPATIENT
Start: 2022-02-28

## 2022-03-19 PROBLEM — E11.40 TYPE 2 DIABETES MELLITUS WITH DIABETIC NEUROPATHY (HCC): Status: ACTIVE | Noted: 2018-04-27

## 2022-03-19 PROBLEM — E11.21 TYPE 2 DIABETES WITH NEPHROPATHY (HCC): Status: ACTIVE | Noted: 2018-04-27

## 2022-03-19 PROBLEM — M48.02 CERVICAL STENOSIS OF SPINAL CANAL: Status: ACTIVE | Noted: 2018-10-18

## 2022-03-19 PROBLEM — Z90.13 H/O BILATERAL MASTECTOMY: Status: ACTIVE | Noted: 2018-09-25

## 2022-03-22 DIAGNOSIS — I10 ESSENTIAL HYPERTENSION: ICD-10-CM

## 2022-03-24 RX ORDER — LOSARTAN POTASSIUM AND HYDROCHLOROTHIAZIDE 25; 100 MG/1; MG/1
TABLET ORAL
Qty: 30 TABLET | Refills: 2 | Status: SHIPPED | OUTPATIENT
Start: 2022-03-24 | End: 2022-06-28

## 2022-04-19 ENCOUNTER — OFFICE VISIT (OUTPATIENT)
Dept: INTERNAL MEDICINE CLINIC | Age: 62
End: 2022-04-19
Attending: NURSE PRACTITIONER
Payer: MEDICAID

## 2022-04-19 ENCOUNTER — HOSPITAL ENCOUNTER (OUTPATIENT)
Dept: GENERAL RADIOLOGY | Age: 62
Discharge: HOME | End: 2022-04-19
Attending: NURSE PRACTITIONER
Payer: MEDICAID

## 2022-04-19 ENCOUNTER — HOSPITAL ENCOUNTER (OUTPATIENT)
Dept: CT IMAGING | Age: 62
Discharge: HOME | End: 2022-04-19
Attending: NURSE PRACTITIONER
Payer: MEDICAID

## 2022-04-19 VITALS
OXYGEN SATURATION: 96 % | RESPIRATION RATE: 20 BRPM | SYSTOLIC BLOOD PRESSURE: 140 MMHG | TEMPERATURE: 97.5 F | HEART RATE: 88 BPM | HEIGHT: 62 IN | BODY MASS INDEX: 41.77 KG/M2 | WEIGHT: 227 LBS | DIASTOLIC BLOOD PRESSURE: 68 MMHG

## 2022-04-19 DIAGNOSIS — M25.552 LEFT HIP PAIN: ICD-10-CM

## 2022-04-19 DIAGNOSIS — R19.04 LEFT LOWER QUADRANT ABDOMINAL MASS: ICD-10-CM

## 2022-04-19 DIAGNOSIS — R19.04 LEFT LOWER QUADRANT ABDOMINAL MASS: Primary | ICD-10-CM

## 2022-04-19 DIAGNOSIS — R10.32 LEFT GROIN PAIN: ICD-10-CM

## 2022-04-19 PROCEDURE — 73502 X-RAY EXAM HIP UNI 2-3 VIEWS: CPT

## 2022-04-19 PROCEDURE — 74011000250 HC RX REV CODE- 250: Performed by: NURSE PRACTITIONER

## 2022-04-19 PROCEDURE — 74011000636 HC RX REV CODE- 636: Performed by: NURSE PRACTITIONER

## 2022-04-19 PROCEDURE — 82565 ASSAY OF CREATININE: CPT

## 2022-04-19 PROCEDURE — 99213 OFFICE O/P EST LOW 20 MIN: CPT | Performed by: NURSE PRACTITIONER

## 2022-04-19 PROCEDURE — 74177 CT ABD & PELVIS W/CONTRAST: CPT

## 2022-04-19 RX ORDER — SYRINGE,INSUL U-500,NDL,0.5ML 31GX15/64"
SYRINGE, EMPTY DISPOSABLE MISCELLANEOUS
COMMUNITY
Start: 2022-02-07

## 2022-04-19 RX ORDER — FLASH GLUCOSE SENSOR
KIT MISCELLANEOUS
COMMUNITY
Start: 2022-04-18

## 2022-04-19 RX ORDER — BARIUM SULFATE 20 MG/ML
900 SUSPENSION ORAL ONCE
Status: COMPLETED | OUTPATIENT
Start: 2022-04-19 | End: 2022-04-19

## 2022-04-19 RX ADMIN — BARIUM SULFATE 900 ML: 20 SUSPENSION ORAL at 18:29

## 2022-04-19 RX ADMIN — IOPAMIDOL 100 ML: 755 INJECTION, SOLUTION INTRAVENOUS at 18:30

## 2022-04-19 NOTE — PROGRESS NOTES
HISTORY OF PRESENT ILLNESS  Sara Kelly is a 59 y.o. female. Patient reports mass of lower abdomen x 2 years. No pain. She feels like it is getting larger. She notes chronic constipation. She takes fiber gummies for relief. No blood in stool. No diarrhea. No vomiting. She had negative cologuard in the past year. Patient has history of fibroids and hysterectomy in 1998. She also survived Breast cancer  in 2008. She notes pain of left groin over the month. It is worse when she first stands after sitting for long periods. It radiates to medial upper thigh. Pain is relieved with naproxen. Visit Vitals  BP (!) 140/68 (BP 1 Location: Left upper arm, BP Patient Position: Sitting, BP Cuff Size: Large adult)   Pulse 88   Temp 97.5 °F (36.4 °C) (Temporal)   Resp 20   Ht 5' 1.7\" (1.567 m)   Wt 227 lb (103 kg)   LMP 06/01/1998   SpO2 96%   BMI 41.92 kg/m²       HPI    Review of Systems   Gastrointestinal:        Mass of abdomen       Physical Exam  Constitutional:       Appearance: She is obese. Abdominal:      General: Abdomen is protuberant. Bowel sounds are normal.      Palpations: There is mass (left lower quadrant mass approximately 2 in x 1.5 in; no pain with palpation; appears to have lymphadenopathy surrounding it). Hernia: A hernia is present. Hernia is present in the ventral area. Comments: Left groin with enlarged lymph nodes; mild left hip pain with frog leg position; pain radiates to posterior buttocks   Skin:     General: Skin is warm and dry. Neurological:      Mental Status: She is alert. ASSESSMENT and PLAN    ICD-10-CM ICD-9-CM    1. Left lower quadrant abdominal mass  R19.04 789.34 CT ABD PELV W CONT      CBC WITH AUTOMATED DIFF      METABOLIC PANEL, COMPREHENSIVE      CBC WITH AUTOMATED DIFF      METABOLIC PANEL, COMPREHENSIVE   2.  Left groin pain  R10.32 789.04 XR HIP LT W OR WO PELV 2-3 VWS   3. Left hip pain  M25.552 719.45 XR HIP LT W OR WO PELV 2-3 VWS     Orders Placed This Encounter    CT ABD PELV W CONT    XR HIP LT W OR WO PELV 2-3 VWS    CBC WITH AUTOMATED DIFF    METABOLIC PANEL, COMPREHENSIVE    FreeStyle Sarath 2 Sensor kit    BD Insulin Syringe U-500 1/2 mL 31 gauge x 15/64\" syrg   CT ordered  Labs ordered  Hip xray ordered

## 2022-04-20 LAB
ALBUMIN SERPL-MCNC: 4.6 G/DL (ref 3.8–4.8)
ALBUMIN/GLOB SERPL: 2.3 {RATIO} (ref 1.2–2.2)
ALP SERPL-CCNC: 108 IU/L (ref 44–121)
ALT SERPL-CCNC: 20 IU/L (ref 0–32)
AST SERPL-CCNC: 15 IU/L (ref 0–40)
BASOPHILS # BLD AUTO: 0.1 X10E3/UL (ref 0–0.2)
BASOPHILS NFR BLD AUTO: 1 %
BILIRUB SERPL-MCNC: <0.2 MG/DL (ref 0–1.2)
BUN SERPL-MCNC: 13 MG/DL (ref 8–27)
BUN/CREAT SERPL: 21 (ref 12–28)
CALCIUM SERPL-MCNC: 10.1 MG/DL (ref 8.7–10.3)
CHLORIDE SERPL-SCNC: 97 MMOL/L (ref 96–106)
CO2 SERPL-SCNC: 24 MMOL/L (ref 20–29)
CREAT BLD-MCNC: 0.6 MG/DL (ref 0.6–1.3)
CREAT SERPL-MCNC: 0.61 MG/DL (ref 0.57–1)
EGFR: 100 ML/MIN/1.73
EOSINOPHIL # BLD AUTO: 0.2 X10E3/UL (ref 0–0.4)
EOSINOPHIL NFR BLD AUTO: 2 %
ERYTHROCYTE [DISTWIDTH] IN BLOOD BY AUTOMATED COUNT: 13.7 % (ref 11.7–15.4)
GLOBULIN SER CALC-MCNC: 2 G/DL (ref 1.5–4.5)
GLUCOSE SERPL-MCNC: 247 MG/DL (ref 65–99)
HCT VFR BLD AUTO: 39.5 % (ref 34–46.6)
HGB BLD-MCNC: 13 G/DL (ref 11.1–15.9)
IMM GRANULOCYTES # BLD AUTO: 0 X10E3/UL (ref 0–0.1)
IMM GRANULOCYTES NFR BLD AUTO: 1 %
LYMPHOCYTES # BLD AUTO: 2.8 X10E3/UL (ref 0.7–3.1)
LYMPHOCYTES NFR BLD AUTO: 43 %
MCH RBC QN AUTO: 28.3 PG (ref 26.6–33)
MCHC RBC AUTO-ENTMCNC: 32.9 G/DL (ref 31.5–35.7)
MCV RBC AUTO: 86 FL (ref 79–97)
MONOCYTES # BLD AUTO: 0.4 X10E3/UL (ref 0.1–0.9)
MONOCYTES NFR BLD AUTO: 7 %
NEUTROPHILS # BLD AUTO: 3.1 X10E3/UL (ref 1.4–7)
NEUTROPHILS NFR BLD AUTO: 46 %
PLATELET # BLD AUTO: 420 X10E3/UL (ref 150–450)
POTASSIUM SERPL-SCNC: 4.3 MMOL/L (ref 3.5–5.2)
PROT SERPL-MCNC: 6.6 G/DL (ref 6–8.5)
RBC # BLD AUTO: 4.59 X10E6/UL (ref 3.77–5.28)
SODIUM SERPL-SCNC: 139 MMOL/L (ref 134–144)
WBC # BLD AUTO: 6.6 X10E3/UL (ref 3.4–10.8)

## 2022-04-26 ENCOUNTER — TELEPHONE (OUTPATIENT)
Dept: SURGERY | Age: 62
End: 2022-04-26

## 2022-04-26 NOTE — TELEPHONE ENCOUNTER
Attempted to call patient about appointment on 4/28/22 with . When patient calls back the appointment needs to be rescheduled with Laurie Dakins or Jodi for a ventral hernia.

## 2022-05-02 ENCOUNTER — OFFICE VISIT (OUTPATIENT)
Dept: SURGERY | Age: 62
End: 2022-05-02
Payer: MEDICAID

## 2022-05-02 VITALS
DIASTOLIC BLOOD PRESSURE: 78 MMHG | RESPIRATION RATE: 20 BRPM | SYSTOLIC BLOOD PRESSURE: 124 MMHG | TEMPERATURE: 98.2 F | BODY MASS INDEX: 42.41 KG/M2 | HEIGHT: 61 IN | WEIGHT: 224.6 LBS | OXYGEN SATURATION: 96 % | HEART RATE: 94 BPM

## 2022-05-02 DIAGNOSIS — K43.2 INCISIONAL HERNIA, WITHOUT OBSTRUCTION OR GANGRENE: ICD-10-CM

## 2022-05-02 DIAGNOSIS — E66.01 MORBID OBESITY (HCC): Primary | ICD-10-CM

## 2022-05-02 PROCEDURE — 99204 OFFICE O/P NEW MOD 45 MIN: CPT | Performed by: SURGERY

## 2022-05-02 RX ORDER — CHOLECALCIFEROL (VITAMIN D3) 125 MCG
CAPSULE ORAL AS NEEDED
COMMUNITY
End: 2022-06-06 | Stop reason: ALTCHOICE

## 2022-05-02 NOTE — PROGRESS NOTES
General Surgery Office Consultation / H & P    CC: Hernia  History of Present Illness: Alma Hua is a 59 y.o. female who presents with incisional hernia. Patient reports that she has had multiple previous abdominal surgeries to include an open hysterectomy, , and open cholecystectomy. Over the years she has had weight gain and development of bulges along her lower midline. She been having some left groin pain as well that radiates down her leg. Does not know if it is neurologic or from her abdomen. The pain is better with exertion. The pain appears to be a sharp pain that is about a 5 out of 10. Not on anything consistently for this. Bending over worsens the pain. Patient has diabetes and is morbidly obese. Her A1c has been increasing the last few checks. She has had prior colonoscopies and a recent Cologuard test that was negative. She reports having some concerns about her current insurance coverage.     Past Medical History:   Diagnosis Date    breast 2008    breast; right DCIS    Depression     Incisional hernia 2012    TO RIGHT AND BELOW NAVAL    Microalbuminuria     Migraine     Osteopenia 2015    Other and unspecified hyperlipidemia 2009    Sleep apnea     c-pap; nightly    Type 2 diabetes mellitus with diabetic neuropathy ([address] 2018    Type 2 diabetes with nephropathy ([address] 2018    Unspecified essential hypertension 2009     Past Surgical History:   Procedure Laterality Date    HX BREAST RECONSTRUCTION      HX  SECTION      HX CHOLECYSTECTOMY      HX HEENT      cleft palate and lip repair    HX MASTECTOMY Bilateral 2008    HX ORTHOPAEDIC  1986    right knee     ARTHROSCOPIC    HX ORTHOPAEDIC  14    right RTC repair shoulder    HX WALESKA AND BSO      [place] RECONST CLEFT PALATE,SOFT/HARD      [place] SINUS SURGERY PROC UNLISTED  X2      Family History   Problem Relation Age of Onset    Cancer Mother ovarian    Lung Disease Mother         copd, pulm HTN    Diabetes Father     Cancer Father         cordoma (rare, left over from umbilical cord); sarcoma    Heart Disease Father 67        CAD, stint placed    Heart Disease Maternal Grandfather     Heart Disease Maternal Aunt     Migraines Son         ATYPICAL, \"ACTS LIKE NERVE DISEASE\"    Anesth Problems Neg Hx      Social History     Socioeconomic History    Marital status:    Tobacco Use    Smoking status: Former Smoker     Packs/day: 2.00     Years: 20.00     Pack years: 40.00     Quit date: 1992     Years since quittin.9    Smokeless tobacco: Never Used   Vaping Use    Vaping Use: Never used   Substance and Sexual Activity    Alcohol use: Yes     Comment: rarely    Drug use: No    Sexual activity: Never   Social History Narrative    ** Merged History Encounter **           Prior to Admission medications    Medication Sig Start Date End Date Taking? Authorizing Provider   naproxen sodium (Aleve) 220 mg cap Take  by mouth as needed. Yes Provider, Historical   FLUoxetine (PROzac) 20 mg capsule TAKE 3 CAPSULES BY MOUTH EVERY DAY 22  Yes Valeria Good MD   FreeStyle Sarath 2 Sensor kit  22  Yes Provider, Historical   BD Insulin Syringe U-500 1/2 mL 31 gauge x 15/64\" syrg USE AS DIRECTED 3 TIMES A DAY 22  Yes Provider, Historical   losartan-hydroCHLOROthiazide (HYZAAR) 100-25 mg per tablet TAKE 1 TABLET BY MOUTH EVERY DAY 3/24/22  Yes Gilmer COLON NP   rosuvastatin (CRESTOR) 20 mg tablet TAKE 1 TABLET BY MOUTH EVERY DAY 3/24/22  Yes Valeria Good MD   amLODIPine (NORVASC) 5 mg tablet Take 1 Tablet by mouth daily. 22  Yes Valeria Good MD   ketotifen (ZADITOR) 0.025 % (0.035 %) ophthalmic solution Administer 1 Drop to both eyes two (2) times a day. Yes Provider, Historical   fluticasone (FLONASE ALLERGY RELIEF) 50 mcg/actuation nasal spray 2 Sprays by Both Nostrils route daily.  19  Yes Lion Jasmin Gant MD   metFORMIN ER (GLUCOPHAGE XR) 500 mg tablet Take 2 Tabs by mouth two (2) times a day. 12/3/18  Yes Melida Bunch MD   Cetirizine (ZYRTEC) 10 mg cap Take  by mouth daily. Yes Provider, Historical   insulin CONCENTRATED regular (HUMULIN R U-500) 500 unit/mL soln by SubCUTAneous route. 70 units before breastfast and lunch, 30 units before bed  Indications: 28 units breakfast and lunch, 20 units at dinner   Yes Provider, Historical   cholecalciferol, vitamin D3, (VITAMIN D3) 2,000 unit tab Take  by mouth. Yes Provider, Historical   aspirin 81 mg Tab Take  by mouth.    Yes Provider, Historical     Allergies   Allergen Reactions    Tenormin [Atenolol] Other (comments)     depression    Vicodin [Hydrocodone-Acetaminophen] Nausea and Vomiting       Review of Systems:  Constitutional: No fever or chills  Neurologic: No headache  Eyes: No scleral icterus or irritated eyes  Nose: No nasal pain or drainage  Mouth: No oral lesions or sore throat  Cardiac: No palpations or chest pain  Pulmonary: No cough or shortness of breath  Gastrointestinal: No nausea, emesis, diarrhea, or constipation  Genitourinary: No dysuria  Musculoskeletal: Left groin tenderness  Skin: No rashes or lesions  Psychiatric: No anxiety or depressed mood    Physical Exam:     Visit Vitals  /78 (BP 1 Location: Left arm, BP Patient Position: Sitting, BP Cuff Size: Large adult)   Pulse 94   Temp 98.2 °F (36.8 °C)   Resp 20   Ht 5' 1\" (1.549 m)   Wt 224 lb 9.6 oz (101.9 kg)   SpO2 96%   BMI 42.44 kg/m²     General: No acute distress, conversant  Eyes: PERRLA, no scleral icterus  HENT: Normocephalic without oral lesions  Neck: Trachea midline without LAD  Cardiac: Normal pulse rate and rhythm  Pulmonary: Symmetric chest rise with normal effort  GI: Soft, NT, ND, large, complex lower midline Swiss cheese defects for incisional hernia, no left or right inguinal hernia palpated, no splenomegaly  Skin: Warm without rash  Extremities: No edema or joint stiffness  Psych: Appropriate mood and affect    Assessment:     25-year-old female with morbid obesity and lower midline incisional hernias    Plan:     Patient with history of prior open hysterectomy and incisional hernia from this. CT scan personally reviewed. No signs of inguinal hernias. Complex Swiss cheese defects to the lower midline. I recommend robotic extended totally extraperitoneal hernia repair with mesh. I do not think I will have to do posterior component separation. We discussed the risk of bleeding, infection, bowel injury, mesh complications, and the risk of anesthesia. We will have to ensure her A1c is less than 8.5. I have placed her on a liver reduction diet to start now. She needs to check with her insurance coverage first.    Discussed a gastric bypass for her before her hernia surgery. She does not think she has the time to wait. She wants to consider this going forward, however.     Signed By: Jammie Bateman MD  Bariatric and General Surgeon  [facility]    May 2, 2022

## 2022-05-02 NOTE — H&P (VIEW-ONLY)
General Surgery Office Consultation / H & P    CC: Hernia  History of Present Illness: Martha Echevarria is a 59 y.o. female who presents with incisional hernia. Patient reports that she has had multiple previous abdominal surgeries to include an open hysterectomy, , and open cholecystectomy. Over the years she has had weight gain and development of bulges along her lower midline. She been having some left groin pain as well that radiates down her leg. Does not know if it is neurologic or from her abdomen. The pain is better with exertion. The pain appears to be a sharp pain that is about a 5 out of 10. Not on anything consistently for this. Bending over worsens the pain. Patient has diabetes and is morbidly obese. Her A1c has been increasing the last few checks. She has had prior colonoscopies and a recent Cologuard test that was negative. She reports having some concerns about her current insurance coverage.     Past Medical History:   Diagnosis Date    breast 2008    breast; right DCIS    Depression     Incisional hernia 2012    TO RIGHT AND BELOW NAVAL    Microalbuminuria     Migraine     Osteopenia 2015    Other and unspecified hyperlipidemia 2009    Sleep apnea     c-pap; nightly    Type 2 diabetes mellitus with diabetic neuropathy ([facility] 2018    Type 2 diabetes with nephropathy ([facility] 2018    Unspecified essential hypertension 2009     Past Surgical History:   Procedure Laterality Date    HX BREAST RECONSTRUCTION      HX  SECTION      HX CHOLECYSTECTOMY      HX HEENT      cleft palate and lip repair    HX MASTECTOMY Bilateral 2008    HX ORTHOPAEDIC  1986    right knee     ARTHROSCOPIC    HX ORTHOPAEDIC  14    right RTC repair shoulder    HX WALESKA AND BSO      [place] RECONST CLEFT PALATE,SOFT/HARD      [place] SINUS SURGERY PROC UNLISTED  X2      Family History   Problem Relation Age of Onset    Cancer Mother ovarian    Lung Disease Mother         copd, pulm HTN    Diabetes Father     Cancer Father         cordoma (rare, left over from umbilical cord); sarcoma    Heart Disease Father 67        CAD, stint placed    Heart Disease Maternal Grandfather     Heart Disease Maternal Aunt     Migraines Son         ATYPICAL, \"ACTS LIKE NERVE DISEASE\"    Anesth Problems Neg Hx      Social History     Socioeconomic History    Marital status:    Tobacco Use    Smoking status: Former Smoker     Packs/day: 2.00     Years: 20.00     Pack years: 40.00     Quit date: 1992     Years since quittin.9    Smokeless tobacco: Never Used   Vaping Use    Vaping Use: Never used   Substance and Sexual Activity    Alcohol use: Yes     Comment: rarely    Drug use: No    Sexual activity: Never   Social History Narrative    ** Merged History Encounter **           Prior to Admission medications    Medication Sig Start Date End Date Taking? Authorizing Provider   naproxen sodium (Aleve) 220 mg cap Take  by mouth as needed. Yes Provider, Historical   FLUoxetine (PROzac) 20 mg capsule TAKE 3 CAPSULES BY MOUTH EVERY DAY 22  Yes Bhargav Andres MD   FreeStyle Sarath 2 Sensor kit  22  Yes Provider, Historical   BD Insulin Syringe U-500 1/2 mL 31 gauge x 15/64\" syrg USE AS DIRECTED 3 TIMES A DAY 22  Yes Provider, Historical   losartan-hydroCHLOROthiazide (HYZAAR) 100-25 mg per tablet TAKE 1 TABLET BY MOUTH EVERY DAY 3/24/22  Yes Hardy COLON, ANTONI   rosuvastatin (CRESTOR) 20 mg tablet TAKE 1 TABLET BY MOUTH EVERY DAY 3/24/22  Yes Bhargav Andres MD   amLODIPine (NORVASC) 5 mg tablet Take 1 Tablet by mouth daily. 22  Yes Bhargav Andres MD   ketotifen (ZADITOR) 0.025 % (0.035 %) ophthalmic solution Administer 1 Drop to both eyes two (2) times a day. Yes Provider, Historical   fluticasone (FLONASE ALLERGY RELIEF) 50 mcg/actuation nasal spray 2 Sprays by Both Nostrils route daily.  19  Yes Lion, Terry Duron MD   metFORMIN ER (GLUCOPHAGE XR) 500 mg tablet Take 2 Tabs by mouth two (2) times a day. 12/3/18  Yes Baldev Rincon MD   Cetirizine (ZYRTEC) 10 mg cap Take  by mouth daily. Yes Provider, Historical   insulin CONCENTRATED regular (HUMULIN R U-500) 500 unit/mL soln by SubCUTAneous route. 70 units before breastfast and lunch, 30 units before bed  Indications: 28 units breakfast and lunch, 20 units at dinner   Yes Provider, Historical   cholecalciferol, vitamin D3, (VITAMIN D3) 2,000 unit tab Take  by mouth. Yes Provider, Historical   aspirin 81 mg Tab Take  by mouth.    Yes Provider, Historical     Allergies   Allergen Reactions    Tenormin [Atenolol] Other (comments)     depression    Vicodin [Hydrocodone-Acetaminophen] Nausea and Vomiting       Review of Systems:  Constitutional: No fever or chills  Neurologic: No headache  Eyes: No scleral icterus or irritated eyes  Nose: No nasal pain or drainage  Mouth: No oral lesions or sore throat  Cardiac: No palpations or chest pain  Pulmonary: No cough or shortness of breath  Gastrointestinal: No nausea, emesis, diarrhea, or constipation  Genitourinary: No dysuria  Musculoskeletal: Left groin tenderness  Skin: No rashes or lesions  Psychiatric: No anxiety or depressed mood    Physical Exam:     Visit Vitals  /78 (BP 1 Location: Left arm, BP Patient Position: Sitting, BP Cuff Size: Large adult)   Pulse 94   Temp 98.2 °F (36.8 °C)   Resp 20   Ht 5' 1\" (1.549 m)   Wt 224 lb 9.6 oz (101.9 kg)   SpO2 96%   BMI 42.44 kg/m²     General: No acute distress, conversant  Eyes: PERRLA, no scleral icterus  HENT: Normocephalic without oral lesions  Neck: Trachea midline without LAD  Cardiac: Normal pulse rate and rhythm  Pulmonary: Symmetric chest rise with normal effort  GI: Soft, NT, ND, large, complex lower midline Swiss cheese defects for incisional hernia, no left or right inguinal hernia palpated, no splenomegaly  Skin: Warm without rash  Extremities: No edema or joint stiffness  Psych: Appropriate mood and affect    Assessment:     43-year-old female with morbid obesity and lower midline incisional hernias    Plan:     Patient with history of prior open hysterectomy and incisional hernia from this. CT scan personally reviewed. No signs of inguinal hernias. Complex Swiss cheese defects to the lower midline. I recommend robotic extended totally extraperitoneal hernia repair with mesh. I do not think I will have to do posterior component separation. We discussed the risk of bleeding, infection, bowel injury, mesh complications, and the risk of anesthesia. We will have to ensure her A1c is less than 8.5. I have placed her on a liver reduction diet to start now. She needs to check with her insurance coverage first.    Discussed a gastric bypass for her before her hernia surgery. She does not think she has the time to wait. She wants to consider this going forward, however.     Signed By: Arminda Devine MD  Bariatric and General Surgeon  [address] [place] [address] [facility]    May 2, 2022

## 2022-05-02 NOTE — PROGRESS NOTES
59year old female here for new pt visit evalution of ventral hernia      1. Have you been to the ER, urgent care clinic since your last visit? Hospitalized since your last visit? No    2. Have you seen or consulted any other health care providers outside of the [address] [place] [address] since your last visit? Include any pap smears or colon screening. [facility]?  For DM follow up

## 2022-05-11 ENCOUNTER — HOSPITAL ENCOUNTER (OUTPATIENT)
Dept: PREADMISSION TESTING | Age: 62
Discharge: HOME | End: 2022-05-11
Payer: MEDICAID

## 2022-05-11 ENCOUNTER — HOSPITAL ENCOUNTER (OUTPATIENT)
Dept: GENERAL RADIOLOGY | Age: 62
Discharge: HOME | End: 2022-05-11
Attending: SURGERY
Payer: MEDICAID

## 2022-05-11 VITALS
BODY MASS INDEX: 40.98 KG/M2 | DIASTOLIC BLOOD PRESSURE: 75 MMHG | HEART RATE: 96 BPM | SYSTOLIC BLOOD PRESSURE: 124 MMHG | TEMPERATURE: 98.2 F | HEIGHT: 62 IN | WEIGHT: 222.66 LBS

## 2022-05-11 LAB
ALBUMIN SERPL-MCNC: 3.8 G/DL (ref 3.5–5)
ALBUMIN/GLOB SERPL: 1.3 {RATIO} (ref 1.1–2.2)
ALP SERPL-CCNC: 96 U/L (ref 45–117)
ALT SERPL-CCNC: 31 U/L (ref 12–78)
ANION GAP SERPL CALC-SCNC: 7 MMOL/L (ref 5–15)
AST SERPL-CCNC: 23 U/L (ref 15–37)
ATRIAL RATE: 81 BPM
BILIRUB SERPL-MCNC: 0.2 MG/DL (ref 0.2–1)
BUN SERPL-MCNC: 18 MG/DL (ref 6–20)
BUN/CREAT SERPL: 27 (ref 12–20)
CALCIUM SERPL-MCNC: 9.8 MG/DL (ref 8.5–10.1)
CALCULATED P AXIS, ECG09: 25 DEGREES
CALCULATED R AXIS, ECG10: 14 DEGREES
CALCULATED T AXIS, ECG11: 17 DEGREES
CHLORIDE SERPL-SCNC: 99 MMOL/L (ref 97–108)
CO2 SERPL-SCNC: 28 MMOL/L (ref 21–32)
CREAT SERPL-MCNC: 0.67 MG/DL (ref 0.55–1.02)
DIAGNOSIS, 93000: NORMAL
ERYTHROCYTE [DISTWIDTH] IN BLOOD BY AUTOMATED COUNT: 13.6 % (ref 11.5–14.5)
EST. AVERAGE GLUCOSE BLD GHB EST-MCNC: 177 MG/DL
GLOBULIN SER CALC-MCNC: 3 G/DL (ref 2–4)
GLUCOSE SERPL-MCNC: 188 MG/DL (ref 65–100)
HBA1C MFR BLD: 7.8 % (ref 4–5.6)
HCT VFR BLD AUTO: 40 % (ref 35–47)
HGB BLD-MCNC: 13 G/DL (ref 11.5–16)
MCH RBC QN AUTO: 28.3 PG (ref 26–34)
MCHC RBC AUTO-ENTMCNC: 32.5 G/DL (ref 30–36.5)
MCV RBC AUTO: 87 FL (ref 80–99)
NRBC # BLD: 0 K/UL (ref 0–0.01)
NRBC BLD-RTO: 0 PER 100 WBC
P-R INTERVAL, ECG05: 170 MS
PLATELET # BLD AUTO: 378 K/UL (ref 150–400)
PMV BLD AUTO: 9 FL (ref 8.9–12.9)
POTASSIUM SERPL-SCNC: 4.1 MMOL/L (ref 3.5–5.1)
PROT SERPL-MCNC: 6.8 G/DL (ref 6.4–8.2)
Q-T INTERVAL, ECG07: 388 MS
QRS DURATION, ECG06: 82 MS
QTC CALCULATION (BEZET), ECG08: 450 MS
RBC # BLD AUTO: 4.6 M/UL (ref 3.8–5.2)
SODIUM SERPL-SCNC: 134 MMOL/L (ref 136–145)
VENTRICULAR RATE, ECG03: 81 BPM
WBC # BLD AUTO: 8.4 K/UL (ref 3.6–11)

## 2022-05-11 PROCEDURE — 71046 X-RAY EXAM CHEST 2 VIEWS: CPT

## 2022-05-11 PROCEDURE — 36415 COLL VENOUS BLD VENIPUNCTURE: CPT

## 2022-05-11 PROCEDURE — 85027 COMPLETE CBC AUTOMATED: CPT

## 2022-05-11 PROCEDURE — 80053 COMPREHEN METABOLIC PANEL: CPT

## 2022-05-11 PROCEDURE — 93005 ELECTROCARDIOGRAM TRACING: CPT

## 2022-05-11 PROCEDURE — 83036 HEMOGLOBIN GLYCOSYLATED A1C: CPT

## 2022-05-11 RX ORDER — BISMUTH SUBSALICYLATE 262 MG
1 TABLET,CHEWABLE ORAL DAILY
COMMUNITY

## 2022-05-11 RX ORDER — IBUPROFEN 200 MG
400 CAPSULE ORAL 2 TIMES DAILY
COMMUNITY

## 2022-05-11 NOTE — PERIOP NOTES
[address] INSTRUCTIONS    Surgery Date:   5/18/22    [facility] staff will call you between 4 PM- 8 PM the day before surgery with your arrival time. If your surgery is on a Monday, we will call you the preceding Friday. Please call [phone] after 8 PM if you did not receive your arrival time. 1. Please report to [facility] Patient Access/Admitting on the 1st floor. Bring your insurance card, photo identification, and any copayment ( if applicable). 2. If you are going home the same day of your surgery, you must have a responsible adult to drive you home. You need to have a responsible adult to stay with you the first 24 hours after surgery and you should not drive a car for 24 hours following your surgery. 3. Do NOT eat any solid foods after midnight the night before surgery including candy, mint or gum. You may drink clear liquids from midnight until 1 hour prior to your arrival. You may drink up to 12 ounces at one time every 4 hours. Please note special instructions, if applicable, below for medications. 4. Do NOT drink alcohol or smoke 24 hours before surgery. STOP smoking for 14 days prior as it helps with breathing and healing after surgery. 5. If you are being admitted to the hospital, please leave personal belongings/luggage in your car until you have an assigned hospital room number. 6. Please wear comfortable clothes. Wear your glasses instead of contacts. We ask that all money, jewelry and valuables be left at home. Wear no make up, particularly mascara, the day of surgery. 7.  All body piercings, rings, and jewelry need to be removed and left at home. Please remove any nail polish or artifical nails from your fingernails. Please wear your hair loose or down. Please no pony-tails, buns, or any metal hair accessories. If you shower the morning of surgery, please do not apply any lotions or powders afterwards. You may wear deodorant, unless having breast surgery.   Do not shave any body area within 24 hours of your surgery. 8. Please follow all instructions to avoid any potential surgical cancellation. 9. Should your physical condition change, (i.e. fever, cold, flu, etc.) please notify your surgeon as soon as possible. 10. It is important to be on time. If a situation occurs where you may be delayed, please call:  [phone] / [phone] on the day of surgery. 11. The Preadmission Testing staff can be reached at [phone]. 12. Special instructions: BRING LIVING WILL       Current Outpatient Medications   Medication Sig    multivitamin (ONE A DAY) tablet Take 1 Tablet by mouth daily.  calcium citrate 200 mg (950 mg) tablet Take 400 mg by mouth two (2) times a day.  FLUoxetine (PROzac) 20 mg capsule TAKE 3 CAPSULES BY MOUTH EVERY DAY (Patient taking differently: every morning. TAKE 3 CAPSULES BY MOUTH EVERY DAY)    FreeStyle Sarath 2 Sensor kit     BD Insulin Syringe U-500 1/2 mL 31 gauge x 15/64\" syrg USE AS DIRECTED 3 TIMES A DAY    losartan-hydroCHLOROthiazide (HYZAAR) 100-25 mg per tablet TAKE 1 TABLET BY MOUTH EVERY DAY    amLODIPine (NORVASC) 5 mg tablet Take 1 Tablet by mouth daily. (Patient taking differently: Take 5 mg by mouth every morning.)    ketotifen (ZADITOR) 0.025 % (0.035 %) ophthalmic solution Administer 1 Drop to both eyes as needed.  fluticasone (FLONASE ALLERGY RELIEF) 50 mcg/actuation nasal spray 2 Sprays by Both Nostrils route daily. (Patient taking differently: 1 Spray by Both Nostrils route daily.)    metFORMIN ER (GLUCOPHAGE XR) 500 mg tablet Take 2 Tabs by mouth two (2) times a day.  Cetirizine (ZYRTEC) 10 mg cap Take  by mouth daily.  insulin CONCENTRATED regular (HUMULIN R U-500) 500 unit/mL soln by SubCUTAneous route. 70 units before breastfast and lunch, 30 units before bed    cholecalciferol, vitamin D3, (VITAMIN D3) 2,000 unit tab Take  by mouth.  aspirin 81 mg Tab Take  by mouth.     naproxen sodium (Aleve) 220 mg cap Take  by mouth as needed. (Patient not taking: Reported on 5/11/2022)    rosuvastatin (CRESTOR) 20 mg tablet TAKE 1 TABLET BY MOUTH EVERY DAY (Patient taking differently: nightly.)     No current facility-administered medications for this encounter. 1. YOU MUST ONLY TAKE THESE MEDICATIONS THE MORNING OF SURGERY WITH A SIP OF WATER: AMLODIPINE PROZAC   2. MEDICATIONS TO TAKE THE MORNING OF SURGERY ONLY IF NEEDED:  3. HOLD these prescription medications BEFORE Surgery:ASPIRIN VIT. D3 MULTIVITAMIN STARTING TODAY 5/11 AND METFORMIN 24 HRS. PRIOR TO SURGERY   4. Ask your surgeon/prescribing physician about when/if to STOP taking these medications:  5. Stop all vitamins, herbal medicines and Aspirin containing products 7 days prior to surgery. Stop any non-steroidal anti-inflammatory drugs (i.e. Ibuprofen, Naproxen, Advil, Aleve) 3 days before surgery. You may take Tylenol. 6. If you are currently taking Plavix, Coumadin,or any other blood-thinning/anticoagulant medication contact your prescribing physician for instructions. Eating and Drinking Before Surgery     You may eat a regular dinner at the usual time on the day before your surgery.  Do NOT eat any solid foods after midnight unless your arrival time at the hospital is 3pm or later.  You may drink clear liquids only from 12 midnight until 1 hours prior to your arrival time at the hospital on the day of your surgery. Do NOT drink alcohol.    Clear liquids include:  o Water  o Fruit juices without pulp( i.e. apple juice)  o Carbonated beverages  o Black coffee (no cream/milk)  o Tea (no cream/milk)  o Gatorade   You may drink up to 12-16 ounces at one time every 4 hours between the hours of midnight and 1 hour before your arrival time at the hospital. Example- if your arrival time at the hospital is 6am, you may drink 12-16 ounces of clear liquids no later than 5am.   If your arrival time at the hospital is 3pm or later, you may eat a light breakfast before 8am.   A light breakfast includes:  o Toast or bagel (no butter)  o Black coffee (no cream/milk)  o Tea (no cream/milk)  o Fruit juices without pulp ( i.e. apple juice)  o Do NOT eat meat, eggs, vegetables or fruit   If you have any questions, please contact your surgeon's office. Preventing Infections Before and After  Your Surgery    IMPORTANT INSTRUCTIONS    You play an important role in your health and preparation for surgery. To reduce the germs on your skin you will need to shower with CHG soap (Chorhexidine gluconate 4%) two times before surgery. CHG soap (Hibiclens, Hex-A-Clens or store brand)   CHG soap will be provided at your Preadmission Testing (PAT) appointment.  If you do not have a PAT appointment before surgery, you may arrange to  CHG soap from our office or purchase CHG soap at a pharmacy, grocery or department store.  You need to purchase TWO 4 ounce bottles to use for your 2 showers. Steps to follow:  1. Wash your hair with your normal shampoo and your body with regular soap and rinse well to remove shampoo and soap from your skin. 2. Wet a clean washcloth and turn off the shower. 3. Put CHG soap on washcloth and apply to your entire body from the neck down. Do not use on your head, face or private parts(genitals). Do not use CHG soap on open sores, wounds or areas of skin irritation. 4. Wash you body gently for 5 minutes. Do not wash your skin too hard. This soap does not create lather. Pay special attention to your underarms and from your belly button to your feet. 5. Turn the shower back on and rinse well to get CHG soap off your body. 6. Pat your skin dry with a clean, dry towel. Do not apply lotions or moisturizer. 7. Put on clean clothes and sleep on fresh bed sheets and do not allow pets to sleep with you.     Shower with CHG soap 2 times before your surgery   The evening before your surgery   The morning of your surgery      Tips to help prevent infections after your surgery:  1. Protect your surgical wound from germs:  ? Hand washing is the most important thing you and your caregivers can do to prevent infections. ? Keep your bandage clean and dry! ? Do not touch your surgical wound. 2. Use clean, freshly washed towels and washcloths every time you shower; do not share bath linens with others. 3. Until your surgical wound is healed, wear clothing and sleep on bed linens each day that are clean and freshly washed. 4. Do not allow pets to sleep in your bed with you or touch your surgical wound. 5. Do not smoke  smoking delays wound healing. This may be a good time to stop smoking. 6. If you have diabetes, it is important for you to manage your blood sugar levels properly before your surgery as well as after your surgery. Poorly managed blood sugar levels slow down wound healing and prevent you from healing completely. Patient Information Regarding COVID Restrictions      Day of Procedure     Please park in the parking deck or any designated visitor parking lot.  Enter the facility through the [facility] of the \[facility] [place] [facility]\"".   On the day of surgery, please provide the cell phone number of the person who will be waiting for you to the Patient Access representative at the time of registration.  Please wear a mask on the day of your procedure.  We are now allowing two designated visitors per stay. Pediatric patients may have 2 designated visitors. These two people may come in with you on the day of your procedure.  No visitors under the age of 13.  The designated visitor must also wear a mask.  Once your procedure and the immediate recovery period is completed, a nurse in the recovery area will contact your designated visitor to inform them of your room number or to otherwise review other pertinent information regarding your care.      Social distancing practices are to be adhered to in waiting areas and the cafeteria. The patient was contacted  in person. She verbalized understanding of all instructions does not  need reinforcement.

## 2022-05-16 ENCOUNTER — OFFICE VISIT (OUTPATIENT)
Dept: SURGERY | Age: 62
End: 2022-05-16
Payer: MEDICAID

## 2022-05-16 VITALS
HEIGHT: 62 IN | WEIGHT: 223 LBS | SYSTOLIC BLOOD PRESSURE: 137 MMHG | TEMPERATURE: 98.2 F | OXYGEN SATURATION: 96 % | DIASTOLIC BLOOD PRESSURE: 74 MMHG | RESPIRATION RATE: 18 BRPM | HEART RATE: 94 BPM | BODY MASS INDEX: 41.04 KG/M2

## 2022-05-16 DIAGNOSIS — E66.01 MORBID OBESITY (HCC): Primary | ICD-10-CM

## 2022-05-16 PROCEDURE — 99215 OFFICE O/P EST HI 40 MIN: CPT | Performed by: SURGERY

## 2022-05-16 NOTE — PROGRESS NOTES
Bariatric Surgery Consultation    CC: Obesity  Subjective: The patient is a 59 y.o. obese  female with a Body mass index is 41.45 kg/m². . They have been considering surgery for some time now. The patient presents to the clinic today to discuss surgical weight loss options. They have made multiple attempts at weight loss over the years without success. They have tried diet and exercise. Unfortunately, none of these have lead to meaningful, sustained weight loss. The patient now suffers from multiple medical conditions related to their obesity including DM, HTN, HLD. Relevant previous surgical history includes: cholecystectomy, hernia repair, WALESKA, BSO, c section. They have attended the bariatric seminar before the visit. The patient's goals for the surgery include lose weight and get off medications.     Tobacco use: no  GERD/hiatal hernia: no  Sleep Apnea assessment: yes    Patient Active Problem List    Diagnosis Date Noted    Cervical stenosis of spinal canal 10/18/2018    H/O bilateral mastectomy 09/25/2018    Type 2 diabetes with nephropathy ([address] 04/27/2018    Type 2 diabetes mellitus with diabetic neuropathy ([address] 04/27/2018    Hyperlipidemia LDL goal <100 04/08/2016    Essential hypertension 10/02/2015    Depression 10/02/2015    Osteopenia 04/09/2015    History of carcinoma in situ of breast 04/15/2013    Incisional hernia 12/17/2012    Morbid obesity ([address] 06/20/2011    Obstructive sleep apnea syndrome 12/04/2009      Past Medical History:   Diagnosis Date    breast 2008    breast; right DCIS    Depression     Incisional hernia 12/17/2012    TO RIGHT AND BELOW NAVAL    Microalbuminuria     Migraine     Osteopenia 4/9/2015    Other and unspecified hyperlipidemia 12/4/2009    Sleep apnea     c-pap; nightly    Type 2 diabetes mellitus with diabetic neuropathy ([address] 4/27/2018    Type 2 diabetes with nephropathy ([address] 4/27/2018    Unspecified essential hypertension 12/4/2009 Past Surgical History:   Procedure Laterality Date    HX BREAST BIOPSY Bilateral 2008    HX BREAST RECONSTRUCTION  -    HX CATARACT REMOVAL Bilateral     HX  SECTION      X 1    HX CHOLECYSTECTOMY      HX GI      COLONOSCOPY    HX HEENT      cleft palate and lip repair    HX MASTECTOMY Bilateral 2008    HX ORTHOPAEDIC  1986    right knee     ARTHROSCOPIC    HX ORTHOPAEDIC  14    right RTC repair shoulder    HX ORTHOPAEDIC      NECK FUSION    HX ORTHOPAEDIC      L THUMB    HX WALESKA AND BSO      OK RECONST CLEFT PALATE,SOFT/HARD      OK SINUS SURGERY PROC UNLISTED  X2      Social History     Tobacco Use    Smoking status: Former Smoker     Packs/day: 2.00     Years: 20.00     Pack years: 40.00     Quit date: 1992     Years since quittin.9    Smokeless tobacco: Never Used   Substance Use Topics    Alcohol use: Yes     Alcohol/week: 0.0 standard drinks     Comment: About 2 per yesr      Family History   Problem Relation Age of Onset    Cancer Mother         Ovarian    Lung Disease Mother         COPD    Diabetes Father     Cancer Father         Chordoma    Heart Disease Father 67        CAD, stint placed    Heart Disease Maternal Grandfather         Black lung    Heart Disease Maternal Aunt         Breast cancer    Migraines Son         ATYPICAL, \"ACTS LIKE NERVE DISEASE\"    Anesth Problems Neg Hx       Prior to Admission medications    Medication Sig Start Date End Date Taking? Authorizing Provider   FLUoxetine (PROzac) 20 mg capsule TAKE 3 CAPSULES BY MOUTH EVERY DAY  Patient taking differently: every morning.  TAKE 3 CAPSULES BY MOUTH EVERY DAY 22  Yes Desirae Agosto MD   FreeStyle Sarath 2 Sensor kit  22  Yes Provider, Historical   BD Insulin Syringe U-500 1/2 mL 31 gauge x 15/64\" syrg USE AS DIRECTED 3 TIMES A DAY 22  Yes Provider, Historical   losartan-hydroCHLOROthiazide (HYZAAR) 100-25 mg per tablet TAKE 1 TABLET BY MOUTH EVERY DAY 3/24/22  Yes Jennie COLON, ANTONI   rosuvastatin (CRESTOR) 20 mg tablet TAKE 1 TABLET BY MOUTH EVERY DAY  Patient taking differently: nightly. 3/24/22  Yes Newton Caballero MD   amLODIPine (NORVASC) 5 mg tablet Take 1 Tablet by mouth daily. Patient taking differently: Take 5 mg by mouth every morning. 2/7/22  Yes Newton Caballero MD   ketotifen (ZADITOR) 0.025 % (0.035 %) ophthalmic solution Administer 1 Drop to both eyes as needed. Yes Provider, Historical   fluticasone (FLONASE ALLERGY RELIEF) 50 mcg/actuation nasal spray 2 Sprays by Both Nostrils route daily. Patient taking differently: 1 Spray by Both Nostrils route daily. 1/9/19  Yes Newton Caballero MD   metFORMIN ER (GLUCOPHAGE XR) 500 mg tablet Take 2 Tabs by mouth two (2) times a day. 12/3/18  Yes Newton Caballero MD   Cetirizine (ZYRTEC) 10 mg cap Take  by mouth daily. Yes Provider, Historical   insulin CONCENTRATED regular (HUMULIN R U-500) 500 unit/mL soln by SubCUTAneous route. 70 units before breastfast and lunch, 30 units before bed   Yes Provider, Historical   aspirin 81 mg Tab Take  by mouth. Yes Provider, Historical   multivitamin (ONE A DAY) tablet Take 1 Tablet by mouth daily. Patient not taking: Reported on 5/16/2022    Provider, Historical   calcium citrate 200 mg (950 mg) tablet Take 400 mg by mouth two (2) times a day. Patient not taking: Reported on 5/16/2022    Provider, Historical   naproxen sodium (Aleve) 220 mg cap Take  by mouth as needed. Patient not taking: Reported on 5/16/2022    Provider, Historical   cholecalciferol, vitamin D3, (VITAMIN D3) 2,000 unit tab Take  by mouth.   Patient not taking: Reported on 5/16/2022    Provider, Historical     Allergies   Allergen Reactions    Tenormin [Atenolol] Other (comments)     depression    Vicodin [Hydrocodone-Acetaminophen] Nausea and Vomiting        Review of Systems:    Constitutional: No fever or chills  Neurologic: No headache  Eyes: No scleral icterus or irritated eyes  Nose: No nasal pain or drainage  Mouth: No oral lesions or sore throat  Cardiac: No palpations or chest pain  Pulmonary: No cough or shortness of breath  Gastrointestinal: Hernia, No nausea, emesis, diarrhea, or constipation  Genitourinary: No dysuria  Musculoskeletal: No muscle or joint tenderness  Skin: No rashes or lesions  Psychiatric: No anxiety or depressed mood      Objective:     Physical Exam:  Visit Vitals  /74 (BP 1 Location: Left upper arm, BP Patient Position: Sitting, BP Cuff Size: Large adult)   Pulse 94   Temp 98.2 °F (36.8 °C)   Resp 18   Ht 5' 1.5\" (1.562 m)   Wt 223 lb (101.2 kg)   SpO2 96%   BMI 41.45 kg/m²     General: No acute distress, conversant  Eyes: PERRLA, no scleral icterus  HENT: Normocephalic without oral lesions  Neck: Trachea midline without LAD  Cardiac: Normal pulse rate and rhythm  Pulmonary: Symmetric chest rise with normal effort  GI: Soft, NT, ND, low midline hernia, no splenomegaly  Skin: Warm without rash  Extremities: No edema or joint stiffness  Psych: Appropriate mood and affect    Assessment:     Morbid obesity with comorbidities  Plan:   The patient presents today with multiple complications relating to their obesity as detailed above. The patient has made multiple unsuccessful attempts at weight loss as detailed above. The patient desires surgical weight loss for a better chance of lifelong weight control and control of co-morbidities. They have attended the bariatric seminar and meet the qualifications for surgery based on the NIH criteria. We have discussed the various surgical options including the robotic sleeve gastrectomy, robotic gastric bypass, laparoscopic vs robotic single anastomosis duodenal ileostomy (TABATHA), and duodenal switch. We also discussed none operative alternatives. The patient is currently interested in the gastric bypass. I believe they are a good candidate for this procedure.     They will need to a/an UGI, to evaluate their anatomy pre operatively. H pylori antigen ordered as well. The patient will be required to not gain weight during this period if starting BMI is 35-40, lose 5% of starting weight if BMI is >40, or lose 10% of starting weight if BMI >60 during the supervised weight loss / pre operative process before our next visit for pre-operative consents. The goal for this patient is to lose 11 lbs. We have discussed the possible complications of bariatric surgery which include but are not limited to failed weight loss, weight regain, malnutrition, leak, bleed, stricture, gastric ulcer, gastric fistula, gastric bleed, gallstones, new or worsening gastric reflux, nausea, emesis, internal hernia, abdominal wall hernia, gastric perforation, need for revision / conversion / or reversal, pregnancy complications and loss, intestinal ischemia, post operative skin complications, possible thinning of their hair, bowel obstruction, dumping syndrome, wound infection, blood clots (DVT, mesenteric thrombus, pulmonary embolism), increased addictive tendency, risk of anesthesia, and death. The patient understands this is a life altering decision and will require compliance to the program for the remainder of their life in order to be monitored to avoid complication and ensure successful, sustained weight control. They will be placed on a lifelong low carbohydrate and low sugar diet, exercise, and vitamin regimen and will require frequent blood draws and office visits to ensure adequate nutrition and program compliance. Visits and follow up will be in compliance with the guidelines set forth by [facility]. I have specifically mentioned the need to avoid all personal and second-hand tobacco exposure, systemic steroids, and NSAIDS after any bariatric surgery to help avoid the above listed complications.  The patient has expressed understanding of the above and would like to enroll in the program. The patient will be submitted for medical and psychological clearance along with establishing with out dietician and joining the pre / post operative support group. They will be screened for depression and sleep apnea and treated pre operatively if needed. The patient will have to demonstrate cessation of tobacco if relevant for at least 3 months preoperatively along with having a controlled HbA1c less than 8. After successful completion of the preoperative regimen the patient will be submitted for insurance approval and pending this will be scheduled for surgery. All questions from the patient have been answered and they have demonstrated appropriate understanding of the process.       Patient Active Problem List    Diagnosis Date Noted    Cervical stenosis of spinal canal 10/18/2018    H/O bilateral mastectomy 09/25/2018    Type 2 diabetes with nephropathy ([facility] 04/27/2018    Type 2 diabetes mellitus with diabetic neuropathy ([facility] 04/27/2018    Hyperlipidemia LDL goal <100 04/08/2016    Essential hypertension 10/02/2015    Depression 10/02/2015    Osteopenia 04/09/2015    History of carcinoma in situ of breast 04/15/2013    Incisional hernia 12/17/2012    Morbid obesity ([facility] 06/20/2011    Obstructive sleep apnea syndrome 12/04/2009         Signed By: Traci Maria MD  Bariatric and General Surgeon  [facility]    May 16, 2022

## 2022-05-16 NOTE — Clinical Note
Patient is going to have hernia surgery by me in a few days. Seeing me now for gastric bypass in the future. I have ordered her an upper GI and H. pylori. Plan for gastric bypass.

## 2022-05-16 NOTE — PROGRESS NOTES
1. Have you been to the ER, urgent care clinic since your last visit? Hospitalized since your last visit? No    2. Have you seen or consulted any other health care providers outside of the [address] [place] [address] since your last visit? Include any pap smears or colon screening.  No

## 2022-05-18 ENCOUNTER — ANESTHESIA (OUTPATIENT)
Dept: SURGERY | Age: 62
End: 2022-05-18
Payer: MEDICAID

## 2022-05-18 ENCOUNTER — HOSPITAL ENCOUNTER (OUTPATIENT)
Age: 62
Setting detail: OBSERVATION
Discharge: HOME | End: 2022-05-20
Attending: SURGERY | Admitting: SURGERY
Payer: MEDICAID

## 2022-05-18 ENCOUNTER — ANESTHESIA EVENT (OUTPATIENT)
Dept: SURGERY | Age: 62
End: 2022-05-18
Payer: MEDICAID

## 2022-05-18 DIAGNOSIS — K43.9 VENTRAL HERNIA WITHOUT OBSTRUCTION OR GANGRENE: ICD-10-CM

## 2022-05-18 DIAGNOSIS — Z87.19 HISTORY OF INCISIONAL HERNIA REPAIR: Primary | ICD-10-CM

## 2022-05-18 DIAGNOSIS — Z98.890 HISTORY OF INCISIONAL HERNIA REPAIR: Primary | ICD-10-CM

## 2022-05-18 DIAGNOSIS — E11.65 TYPE 2 DIABETES MELLITUS WITH HYPERGLYCEMIA, WITH LONG-TERM CURRENT USE OF INSULIN (HCC): ICD-10-CM

## 2022-05-18 DIAGNOSIS — Z79.4 TYPE 2 DIABETES MELLITUS WITH HYPERGLYCEMIA, WITH LONG-TERM CURRENT USE OF INSULIN (HCC): ICD-10-CM

## 2022-05-18 LAB
GLUCOSE BLD STRIP.AUTO-MCNC: 262 MG/DL (ref 65–117)
GLUCOSE BLD STRIP.AUTO-MCNC: 289 MG/DL (ref 65–117)
GLUCOSE BLD STRIP.AUTO-MCNC: 291 MG/DL (ref 65–117)
GLUCOSE BLD STRIP.AUTO-MCNC: 339 MG/DL (ref 65–117)
SERVICE CMNT-IMP: ABNORMAL

## 2022-05-18 PROCEDURE — 74011250636 HC RX REV CODE- 250/636: Performed by: SURGERY

## 2022-05-18 PROCEDURE — 74011250636 HC RX REV CODE- 250/636: Performed by: ANESTHESIOLOGY

## 2022-05-18 PROCEDURE — 76010000880 HC OR TIME 4 TO 4.5HR INTENSV - TIER 2: Performed by: SURGERY

## 2022-05-18 PROCEDURE — 77030035278 HC STPLR SEAL ENDOWR INTU -B: Performed by: SURGERY

## 2022-05-18 PROCEDURE — 49655 PR LAP, INCISIONAL HERNIA REPAIR,INCARCERATED: CPT | Performed by: SURGERY

## 2022-05-18 PROCEDURE — 74011000250 HC RX REV CODE- 250: Performed by: SURGERY

## 2022-05-18 PROCEDURE — 77030031139 HC SUT VCRL2 J&J -A: Performed by: SURGERY

## 2022-05-18 PROCEDURE — 76210000017 HC OR PH I REC 1.5 TO 2 HR: Performed by: SURGERY

## 2022-05-18 PROCEDURE — 77030002916 HC SUT ETHLN J&J -A: Performed by: SURGERY

## 2022-05-18 PROCEDURE — 74011000250 HC RX REV CODE- 250: Performed by: NURSE ANESTHETIST, CERTIFIED REGISTERED

## 2022-05-18 PROCEDURE — 15734 MUSCLE-SKIN GRAFT TRUNK: CPT | Performed by: PHYSICIAN ASSISTANT

## 2022-05-18 PROCEDURE — 77030008684 HC TU ET CUF COVD -B: Performed by: NURSE ANESTHETIST, CERTIFIED REGISTERED

## 2022-05-18 PROCEDURE — 77030035489 HC REDUCR CANN ENDOWR INTU -C: Performed by: SURGERY

## 2022-05-18 PROCEDURE — 77030040361 HC SLV COMPR DVT MDII -B: Performed by: SURGERY

## 2022-05-18 PROCEDURE — 76060000039 HC ANESTHESIA 4 TO 4.5 HR: Performed by: SURGERY

## 2022-05-18 PROCEDURE — 77030035277 HC OBTRTR BLDELSS DISP INTU -B: Performed by: SURGERY

## 2022-05-18 PROCEDURE — 82962 GLUCOSE BLOOD TEST: CPT

## 2022-05-18 PROCEDURE — 74011250637 HC RX REV CODE- 250/637: Performed by: ANESTHESIOLOGY

## 2022-05-18 PROCEDURE — 77030038552 HC DRN WND MDII -A: Performed by: SURGERY

## 2022-05-18 PROCEDURE — 74011636637 HC RX REV CODE- 636/637: Performed by: SURGERY

## 2022-05-18 PROCEDURE — 77030010507 HC ADH SKN DERMBND J&J -B: Performed by: SURGERY

## 2022-05-18 PROCEDURE — 74011636637 HC RX REV CODE- 636/637

## 2022-05-18 PROCEDURE — 77030016151 HC PROTCTR LNS DFOG COVD -B: Performed by: SURGERY

## 2022-05-18 PROCEDURE — 77030002933 HC SUT MCRYL J&J -A: Performed by: SURGERY

## 2022-05-18 PROCEDURE — 77030036554: Performed by: SURGERY

## 2022-05-18 PROCEDURE — 77030020053 HC ELECTRD LAPSCP COVD -B: Performed by: SURGERY

## 2022-05-18 PROCEDURE — 2709999900 HC NON-CHARGEABLE SUPPLY: Performed by: SURGERY

## 2022-05-18 PROCEDURE — 74011000250 HC RX REV CODE- 250: Performed by: ANESTHESIOLOGY

## 2022-05-18 PROCEDURE — 49655 PR LAP, INCISIONAL HERNIA REPAIR,INCARCERATED: CPT | Performed by: PHYSICIAN ASSISTANT

## 2022-05-18 PROCEDURE — C1781 MESH (IMPLANTABLE): HCPCS | Performed by: SURGERY

## 2022-05-18 PROCEDURE — G0378 HOSPITAL OBSERVATION PER HR: HCPCS

## 2022-05-18 PROCEDURE — 15734 MUSCLE-SKIN GRAFT TRUNK: CPT | Performed by: SURGERY

## 2022-05-18 PROCEDURE — 17999 UNLISTD PX SKN MUC MEMB SUBQ: CPT | Performed by: SURGERY

## 2022-05-18 PROCEDURE — 77030040922 HC BLNKT HYPOTHRM STRY -A

## 2022-05-18 PROCEDURE — 77030002966 HC SUT PDS J&J -A: Performed by: SURGERY

## 2022-05-18 PROCEDURE — 77030034628 HC LIGASURE LAP SEAL DIV MD COVD -F: Performed by: SURGERY

## 2022-05-18 PROCEDURE — 77030039895 HC SYST SMK EVAC LAP COVD -B: Performed by: SURGERY

## 2022-05-18 PROCEDURE — 77030035464 HC SUT VLOC NON ABS COVD -B: Performed by: SURGERY

## 2022-05-18 PROCEDURE — 77030008603 HC TRCR ENDOSC EPATH J&J -C: Performed by: SURGERY

## 2022-05-18 PROCEDURE — 74011636637 HC RX REV CODE- 636/637: Performed by: ANESTHESIOLOGY

## 2022-05-18 PROCEDURE — 77030022704 HC SUT VLOC COVD -B: Performed by: SURGERY

## 2022-05-18 PROCEDURE — 74011250636 HC RX REV CODE- 250/636: Performed by: NURSE ANESTHETIST, CERTIFIED REGISTERED

## 2022-05-18 PROCEDURE — 74011250637 HC RX REV CODE- 250/637: Performed by: SURGERY

## 2022-05-18 DEVICE — BARD SOFT MESH, 12" X 12" (30. 5 CM X 30.5 CM)
Type: IMPLANTABLE DEVICE | Site: ABDOMEN | Status: FUNCTIONAL
Brand: BARD

## 2022-05-18 RX ORDER — OXYCODONE AND ACETAMINOPHEN 5; 325 MG/1; MG/1
1 TABLET ORAL
Status: DISCONTINUED | OUTPATIENT
Start: 2022-05-18 | End: 2022-05-19

## 2022-05-18 RX ORDER — FLUOXETINE HYDROCHLORIDE 20 MG/1
60 CAPSULE ORAL DAILY
Status: DISCONTINUED | OUTPATIENT
Start: 2022-05-19 | End: 2022-05-20 | Stop reason: HOSPADM

## 2022-05-18 RX ORDER — FENTANYL CITRATE 50 UG/ML
25 INJECTION, SOLUTION INTRAMUSCULAR; INTRAVENOUS
Status: DISCONTINUED | OUTPATIENT
Start: 2022-05-18 | End: 2022-05-18 | Stop reason: HOSPADM

## 2022-05-18 RX ORDER — MIDAZOLAM HYDROCHLORIDE 1 MG/ML
1 INJECTION, SOLUTION INTRAMUSCULAR; INTRAVENOUS AS NEEDED
Status: DISCONTINUED | OUTPATIENT
Start: 2022-05-18 | End: 2022-05-18 | Stop reason: HOSPADM

## 2022-05-18 RX ORDER — GLYCOPYRROLATE 0.2 MG/ML
INJECTION INTRAMUSCULAR; INTRAVENOUS AS NEEDED
Status: DISCONTINUED | OUTPATIENT
Start: 2022-05-18 | End: 2022-05-18 | Stop reason: HOSPADM

## 2022-05-18 RX ORDER — ONDANSETRON 2 MG/ML
4 INJECTION INTRAMUSCULAR; INTRAVENOUS
Status: DISCONTINUED | OUTPATIENT
Start: 2022-05-18 | End: 2022-05-20 | Stop reason: HOSPADM

## 2022-05-18 RX ORDER — LIDOCAINE HYDROCHLORIDE 10 MG/ML
0.1 INJECTION, SOLUTION EPIDURAL; INFILTRATION; INTRACAUDAL; PERINEURAL AS NEEDED
Status: DISCONTINUED | OUTPATIENT
Start: 2022-05-18 | End: 2022-05-18 | Stop reason: HOSPADM

## 2022-05-18 RX ORDER — POLYETHYLENE GLYCOL 3350 17 G/17G
17 POWDER, FOR SOLUTION ORAL DAILY PRN
Status: DISCONTINUED | OUTPATIENT
Start: 2022-05-18 | End: 2022-05-20 | Stop reason: HOSPADM

## 2022-05-18 RX ORDER — FENTANYL CITRATE 50 UG/ML
50 INJECTION, SOLUTION INTRAMUSCULAR; INTRAVENOUS AS NEEDED
Status: DISCONTINUED | OUTPATIENT
Start: 2022-05-18 | End: 2022-05-18 | Stop reason: HOSPADM

## 2022-05-18 RX ORDER — ENOXAPARIN SODIUM 100 MG/ML
30 INJECTION SUBCUTANEOUS EVERY 12 HOURS
Status: DISCONTINUED | OUTPATIENT
Start: 2022-05-19 | End: 2022-05-20 | Stop reason: HOSPADM

## 2022-05-18 RX ORDER — LABETALOL HYDROCHLORIDE 5 MG/ML
10 INJECTION, SOLUTION INTRAVENOUS
Status: DISCONTINUED | OUTPATIENT
Start: 2022-05-18 | End: 2022-05-18 | Stop reason: HOSPADM

## 2022-05-18 RX ORDER — ASPIRIN 81 MG/1
81 TABLET ORAL DAILY
Status: DISCONTINUED | OUTPATIENT
Start: 2022-05-19 | End: 2022-05-20 | Stop reason: HOSPADM

## 2022-05-18 RX ORDER — SUCCINYLCHOLINE CHLORIDE 20 MG/ML
INJECTION INTRAMUSCULAR; INTRAVENOUS AS NEEDED
Status: DISCONTINUED | OUTPATIENT
Start: 2022-05-18 | End: 2022-05-18 | Stop reason: HOSPADM

## 2022-05-18 RX ORDER — ONDANSETRON 2 MG/ML
4 INJECTION INTRAMUSCULAR; INTRAVENOUS AS NEEDED
Status: DISCONTINUED | OUTPATIENT
Start: 2022-05-18 | End: 2022-05-18 | Stop reason: HOSPADM

## 2022-05-18 RX ORDER — SODIUM CHLORIDE 9 MG/ML
25 INJECTION, SOLUTION INTRAVENOUS CONTINUOUS
Status: DISCONTINUED | OUTPATIENT
Start: 2022-05-18 | End: 2022-05-18 | Stop reason: HOSPADM

## 2022-05-18 RX ORDER — ROPIVACAINE HYDROCHLORIDE 5 MG/ML
30 INJECTION, SOLUTION EPIDURAL; INFILTRATION; PERINEURAL AS NEEDED
Status: DISCONTINUED | OUTPATIENT
Start: 2022-05-18 | End: 2022-05-18 | Stop reason: HOSPADM

## 2022-05-18 RX ORDER — ROCURONIUM BROMIDE 10 MG/ML
INJECTION, SOLUTION INTRAVENOUS AS NEEDED
Status: DISCONTINUED | OUTPATIENT
Start: 2022-05-18 | End: 2022-05-18 | Stop reason: HOSPADM

## 2022-05-18 RX ORDER — ACETAMINOPHEN 650 MG/1
650 SUPPOSITORY RECTAL
Status: DISCONTINUED | OUTPATIENT
Start: 2022-05-18 | End: 2022-05-20 | Stop reason: HOSPADM

## 2022-05-18 RX ORDER — SODIUM CHLORIDE 0.9 % (FLUSH) 0.9 %
5-40 SYRINGE (ML) INJECTION AS NEEDED
Status: DISCONTINUED | OUTPATIENT
Start: 2022-05-18 | End: 2022-05-18 | Stop reason: HOSPADM

## 2022-05-18 RX ORDER — SODIUM CHLORIDE 9 MG/ML
125 INJECTION, SOLUTION INTRAVENOUS CONTINUOUS
Status: DISCONTINUED | OUTPATIENT
Start: 2022-05-18 | End: 2022-05-20 | Stop reason: HOSPADM

## 2022-05-18 RX ORDER — SODIUM CHLORIDE 0.9 % (FLUSH) 0.9 %
5-40 SYRINGE (ML) INJECTION EVERY 8 HOURS
Status: DISCONTINUED | OUTPATIENT
Start: 2022-05-18 | End: 2022-05-18 | Stop reason: HOSPADM

## 2022-05-18 RX ORDER — HYDROMORPHONE HYDROCHLORIDE 1 MG/ML
1 INJECTION, SOLUTION INTRAMUSCULAR; INTRAVENOUS; SUBCUTANEOUS
Status: DISCONTINUED | OUTPATIENT
Start: 2022-05-18 | End: 2022-05-20 | Stop reason: HOSPADM

## 2022-05-18 RX ORDER — FENTANYL CITRATE 50 UG/ML
INJECTION, SOLUTION INTRAMUSCULAR; INTRAVENOUS AS NEEDED
Status: DISCONTINUED | OUTPATIENT
Start: 2022-05-18 | End: 2022-05-18 | Stop reason: HOSPADM

## 2022-05-18 RX ORDER — OXYCODONE AND ACETAMINOPHEN 10; 325 MG/1; MG/1
1 TABLET ORAL
Status: DISCONTINUED | OUTPATIENT
Start: 2022-05-18 | End: 2022-05-19

## 2022-05-18 RX ORDER — HYDROMORPHONE HYDROCHLORIDE 2 MG/ML
INJECTION, SOLUTION INTRAMUSCULAR; INTRAVENOUS; SUBCUTANEOUS AS NEEDED
Status: DISCONTINUED | OUTPATIENT
Start: 2022-05-18 | End: 2022-05-18 | Stop reason: HOSPADM

## 2022-05-18 RX ORDER — AMLODIPINE BESYLATE 5 MG/1
5 TABLET ORAL DAILY
Status: DISCONTINUED | OUTPATIENT
Start: 2022-05-19 | End: 2022-05-20 | Stop reason: HOSPADM

## 2022-05-18 RX ORDER — CYCLOBENZAPRINE HCL 10 MG
10 TABLET ORAL 3 TIMES DAILY
Status: DISCONTINUED | OUTPATIENT
Start: 2022-05-18 | End: 2022-05-20 | Stop reason: HOSPADM

## 2022-05-18 RX ORDER — NEOSTIGMINE METHYLSULFATE 1 MG/ML
INJECTION, SOLUTION INTRAVENOUS AS NEEDED
Status: DISCONTINUED | OUTPATIENT
Start: 2022-05-18 | End: 2022-05-18 | Stop reason: HOSPADM

## 2022-05-18 RX ORDER — MIDAZOLAM HYDROCHLORIDE 1 MG/ML
0.5 INJECTION, SOLUTION INTRAMUSCULAR; INTRAVENOUS
Status: DISCONTINUED | OUTPATIENT
Start: 2022-05-18 | End: 2022-05-18 | Stop reason: HOSPADM

## 2022-05-18 RX ORDER — KETOTIFEN FUMARATE 0.35 MG/ML
1 SOLUTION/ DROPS OPHTHALMIC
Status: DISCONTINUED | OUTPATIENT
Start: 2022-05-18 | End: 2022-05-20 | Stop reason: HOSPADM

## 2022-05-18 RX ORDER — PROMETHAZINE HYDROCHLORIDE 25 MG/1
12.5 TABLET ORAL
Status: DISCONTINUED | OUTPATIENT
Start: 2022-05-18 | End: 2022-05-20 | Stop reason: HOSPADM

## 2022-05-18 RX ORDER — INSULIN LISPRO 100 [IU]/ML
6 INJECTION, SOLUTION INTRAVENOUS; SUBCUTANEOUS ONCE
Status: COMPLETED | OUTPATIENT
Start: 2022-05-18 | End: 2022-05-18

## 2022-05-18 RX ORDER — KETAMINE HYDROCHLORIDE 50 MG/ML
INJECTION, SOLUTION INTRAMUSCULAR; INTRAVENOUS AS NEEDED
Status: DISCONTINUED | OUTPATIENT
Start: 2022-05-18 | End: 2022-05-18 | Stop reason: HOSPADM

## 2022-05-18 RX ORDER — FLUOXETINE HYDROCHLORIDE 20 MG/1
20 CAPSULE ORAL DAILY
Status: DISCONTINUED | OUTPATIENT
Start: 2022-05-19 | End: 2022-05-18

## 2022-05-18 RX ORDER — MIDAZOLAM HYDROCHLORIDE 1 MG/ML
INJECTION, SOLUTION INTRAMUSCULAR; INTRAVENOUS AS NEEDED
Status: DISCONTINUED | OUTPATIENT
Start: 2022-05-18 | End: 2022-05-18 | Stop reason: HOSPADM

## 2022-05-18 RX ORDER — SODIUM CHLORIDE 0.9 % (FLUSH) 0.9 %
5-40 SYRINGE (ML) INJECTION EVERY 8 HOURS
Status: DISCONTINUED | OUTPATIENT
Start: 2022-05-18 | End: 2022-05-20 | Stop reason: HOSPADM

## 2022-05-18 RX ORDER — CETIRIZINE HCL 10 MG
10 TABLET ORAL DAILY
Status: DISCONTINUED | OUTPATIENT
Start: 2022-05-19 | End: 2022-05-20 | Stop reason: HOSPADM

## 2022-05-18 RX ORDER — DIPHENHYDRAMINE HYDROCHLORIDE 50 MG/ML
12.5 INJECTION, SOLUTION INTRAMUSCULAR; INTRAVENOUS AS NEEDED
Status: DISCONTINUED | OUTPATIENT
Start: 2022-05-18 | End: 2022-05-18 | Stop reason: HOSPADM

## 2022-05-18 RX ORDER — SODIUM CHLORIDE, SODIUM LACTATE, POTASSIUM CHLORIDE, CALCIUM CHLORIDE 600; 310; 30; 20 MG/100ML; MG/100ML; MG/100ML; MG/100ML
100 INJECTION, SOLUTION INTRAVENOUS CONTINUOUS
Status: DISCONTINUED | OUTPATIENT
Start: 2022-05-18 | End: 2022-05-18 | Stop reason: HOSPADM

## 2022-05-18 RX ORDER — MAGNESIUM SULFATE 100 %
4 CRYSTALS MISCELLANEOUS AS NEEDED
Status: DISCONTINUED | OUTPATIENT
Start: 2022-05-18 | End: 2022-05-20 | Stop reason: HOSPADM

## 2022-05-18 RX ORDER — MORPHINE SULFATE 2 MG/ML
2 INJECTION, SOLUTION INTRAMUSCULAR; INTRAVENOUS
Status: DISCONTINUED | OUTPATIENT
Start: 2022-05-18 | End: 2022-05-18 | Stop reason: HOSPADM

## 2022-05-18 RX ORDER — INSULIN LISPRO 100 [IU]/ML
INJECTION, SOLUTION INTRAVENOUS; SUBCUTANEOUS
Status: COMPLETED
Start: 2022-05-18 | End: 2022-05-18

## 2022-05-18 RX ORDER — SODIUM CHLORIDE 0.9 % (FLUSH) 0.9 %
5-40 SYRINGE (ML) INJECTION AS NEEDED
Status: DISCONTINUED | OUTPATIENT
Start: 2022-05-18 | End: 2022-05-20 | Stop reason: HOSPADM

## 2022-05-18 RX ORDER — BUPIVACAINE HYDROCHLORIDE AND EPINEPHRINE 5; 5 MG/ML; UG/ML
30 INJECTION, SOLUTION EPIDURAL; INTRACAUDAL; PERINEURAL ONCE
Status: COMPLETED | OUTPATIENT
Start: 2022-05-18 | End: 2022-05-18

## 2022-05-18 RX ORDER — ONDANSETRON 2 MG/ML
INJECTION INTRAMUSCULAR; INTRAVENOUS AS NEEDED
Status: DISCONTINUED | OUTPATIENT
Start: 2022-05-18 | End: 2022-05-18 | Stop reason: HOSPADM

## 2022-05-18 RX ORDER — LIDOCAINE HYDROCHLORIDE 20 MG/ML
INJECTION, SOLUTION EPIDURAL; INFILTRATION; INTRACAUDAL; PERINEURAL AS NEEDED
Status: DISCONTINUED | OUTPATIENT
Start: 2022-05-18 | End: 2022-05-18 | Stop reason: HOSPADM

## 2022-05-18 RX ORDER — ENOXAPARIN SODIUM 100 MG/ML
40 INJECTION SUBCUTANEOUS DAILY
Status: DISCONTINUED | OUTPATIENT
Start: 2022-05-19 | End: 2022-05-18

## 2022-05-18 RX ORDER — SODIUM CHLORIDE, SODIUM LACTATE, POTASSIUM CHLORIDE, CALCIUM CHLORIDE 600; 310; 30; 20 MG/100ML; MG/100ML; MG/100ML; MG/100ML
INJECTION, SOLUTION INTRAVENOUS
Status: DISCONTINUED | OUTPATIENT
Start: 2022-05-18 | End: 2022-05-18 | Stop reason: HOSPADM

## 2022-05-18 RX ORDER — ACETAMINOPHEN 325 MG/1
650 TABLET ORAL
Status: DISCONTINUED | OUTPATIENT
Start: 2022-05-18 | End: 2022-05-20 | Stop reason: HOSPADM

## 2022-05-18 RX ORDER — ACETAMINOPHEN 325 MG/1
650 TABLET ORAL ONCE
Status: COMPLETED | OUTPATIENT
Start: 2022-05-18 | End: 2022-05-18

## 2022-05-18 RX ORDER — PROPOFOL 10 MG/ML
INJECTION, EMULSION INTRAVENOUS AS NEEDED
Status: DISCONTINUED | OUTPATIENT
Start: 2022-05-18 | End: 2022-05-18 | Stop reason: HOSPADM

## 2022-05-18 RX ORDER — INSULIN LISPRO 100 [IU]/ML
INJECTION, SOLUTION INTRAVENOUS; SUBCUTANEOUS
Status: DISCONTINUED | OUTPATIENT
Start: 2022-05-18 | End: 2022-05-19

## 2022-05-18 RX ORDER — HYDROMORPHONE HYDROCHLORIDE 1 MG/ML
0.5 INJECTION, SOLUTION INTRAMUSCULAR; INTRAVENOUS; SUBCUTANEOUS
Status: DISCONTINUED | OUTPATIENT
Start: 2022-05-18 | End: 2022-05-18 | Stop reason: HOSPADM

## 2022-05-18 RX ADMIN — FENTANYL CITRATE 50 MCG: 50 INJECTION, SOLUTION INTRAMUSCULAR; INTRAVENOUS at 10:49

## 2022-05-18 RX ADMIN — ONDANSETRON HYDROCHLORIDE 4 MG: 2 INJECTION, SOLUTION INTRAMUSCULAR; INTRAVENOUS at 11:17

## 2022-05-18 RX ADMIN — KETAMINE HYDROCHLORIDE 10 MG: 50 INJECTION, SOLUTION INTRAMUSCULAR; INTRAVENOUS at 11:29

## 2022-05-18 RX ADMIN — GLYCOPYRROLATE 0.1 MG: 0.2 INJECTION, SOLUTION INTRAMUSCULAR; INTRAVENOUS at 10:43

## 2022-05-18 RX ADMIN — HYDROMORPHONE HYDROCHLORIDE 1 MG: 1 INJECTION, SOLUTION INTRAMUSCULAR; INTRAVENOUS; SUBCUTANEOUS at 21:39

## 2022-05-18 RX ADMIN — SODIUM CHLORIDE, POTASSIUM CHLORIDE, SODIUM LACTATE AND CALCIUM CHLORIDE: 600; 310; 30; 20 INJECTION, SOLUTION INTRAVENOUS at 13:42

## 2022-05-18 RX ADMIN — LABETALOL HYDROCHLORIDE 10 MG: 5 INJECTION INTRAVENOUS at 15:15

## 2022-05-18 RX ADMIN — GLYCOPYRROLATE 0.3 MG: 0.2 INJECTION, SOLUTION INTRAMUSCULAR; INTRAVENOUS at 14:37

## 2022-05-18 RX ADMIN — ROCURONIUM BROMIDE 20 MG: 10 SOLUTION INTRAVENOUS at 11:16

## 2022-05-18 RX ADMIN — ROCURONIUM BROMIDE 25 MG: 10 SOLUTION INTRAVENOUS at 10:55

## 2022-05-18 RX ADMIN — FENTANYL CITRATE 25 MCG: 50 INJECTION, SOLUTION INTRAMUSCULAR; INTRAVENOUS at 16:15

## 2022-05-18 RX ADMIN — INSULIN LISPRO 6 UNITS: 100 INJECTION, SOLUTION INTRAVENOUS; SUBCUTANEOUS at 16:00

## 2022-05-18 RX ADMIN — OXYCODONE AND ACETAMINOPHEN 1 TABLET: 5; 325 TABLET ORAL at 17:36

## 2022-05-18 RX ADMIN — HYDROMORPHONE HYDROCHLORIDE 1 MG: 1 INJECTION, SOLUTION INTRAMUSCULAR; INTRAVENOUS; SUBCUTANEOUS at 18:32

## 2022-05-18 RX ADMIN — Medication 6 UNITS: at 16:00

## 2022-05-18 RX ADMIN — SODIUM CHLORIDE, PRESERVATIVE FREE 10 ML: 5 INJECTION INTRAVENOUS at 17:36

## 2022-05-18 RX ADMIN — CYCLOBENZAPRINE 10 MG: 10 TABLET, FILM COATED ORAL at 21:39

## 2022-05-18 RX ADMIN — MIDAZOLAM 2 MG: 1 INJECTION INTRAMUSCULAR; INTRAVENOUS at 10:43

## 2022-05-18 RX ADMIN — FENTANYL CITRATE 25 MCG: 50 INJECTION, SOLUTION INTRAMUSCULAR; INTRAVENOUS at 16:00

## 2022-05-18 RX ADMIN — KETAMINE HYDROCHLORIDE 20 MG: 50 INJECTION, SOLUTION INTRAMUSCULAR; INTRAVENOUS at 12:34

## 2022-05-18 RX ADMIN — ACETAMINOPHEN 650 MG: 325 TABLET ORAL at 08:55

## 2022-05-18 RX ADMIN — FENTANYL CITRATE 50 MCG: 50 INJECTION, SOLUTION INTRAMUSCULAR; INTRAVENOUS at 11:16

## 2022-05-18 RX ADMIN — ROCURONIUM BROMIDE 20 MG: 10 SOLUTION INTRAVENOUS at 11:28

## 2022-05-18 RX ADMIN — CYCLOBENZAPRINE 10 MG: 10 TABLET, FILM COATED ORAL at 17:36

## 2022-05-18 RX ADMIN — FENTANYL CITRATE 25 MCG: 50 INJECTION, SOLUTION INTRAMUSCULAR; INTRAVENOUS at 15:55

## 2022-05-18 RX ADMIN — NEOSTIGMINE METHYLSULFATE 3 MG: 1 INJECTION, SOLUTION INTRAVENOUS at 14:37

## 2022-05-18 RX ADMIN — WATER 2 G: 1 INJECTION INTRAMUSCULAR; INTRAVENOUS; SUBCUTANEOUS at 11:07

## 2022-05-18 RX ADMIN — SUCCINYLCHOLINE CHLORIDE 120 MG: 20 INJECTION, SOLUTION INTRAMUSCULAR; INTRAVENOUS at 10:49

## 2022-05-18 RX ADMIN — SODIUM CHLORIDE, POTASSIUM CHLORIDE, SODIUM LACTATE AND CALCIUM CHLORIDE: 600; 310; 30; 20 INJECTION, SOLUTION INTRAVENOUS at 09:05

## 2022-05-18 RX ADMIN — ROCURONIUM BROMIDE 20 MG: 10 SOLUTION INTRAVENOUS at 12:34

## 2022-05-18 RX ADMIN — Medication 4 UNITS: at 21:39

## 2022-05-18 RX ADMIN — SODIUM CHLORIDE 75 ML/HR: 9 INJECTION, SOLUTION INTRAVENOUS at 21:45

## 2022-05-18 RX ADMIN — HYDROMORPHONE HYDROCHLORIDE 0.5 MG: 2 INJECTION, SOLUTION INTRAMUSCULAR; INTRAVENOUS; SUBCUTANEOUS at 12:37

## 2022-05-18 RX ADMIN — Medication 10 UNITS: at 09:50

## 2022-05-18 RX ADMIN — PROPOFOL 40 MG: 10 INJECTION, EMULSION INTRAVENOUS at 11:16

## 2022-05-18 RX ADMIN — SODIUM CHLORIDE, POTASSIUM CHLORIDE, SODIUM LACTATE AND CALCIUM CHLORIDE 100 ML/HR: 600; 310; 30; 20 INJECTION, SOLUTION INTRAVENOUS at 09:07

## 2022-05-18 RX ADMIN — LIDOCAINE HYDROCHLORIDE 40 MG: 20 INJECTION, SOLUTION EPIDURAL; INFILTRATION; INTRACAUDAL; PERINEURAL at 10:49

## 2022-05-18 RX ADMIN — PROPOFOL 160 MG: 10 INJECTION, EMULSION INTRAVENOUS at 10:49

## 2022-05-18 RX ADMIN — KETAMINE HYDROCHLORIDE 20 MG: 50 INJECTION, SOLUTION INTRAMUSCULAR; INTRAVENOUS at 11:24

## 2022-05-18 RX ADMIN — ROCURONIUM BROMIDE 5 MG: 10 SOLUTION INTRAVENOUS at 10:49

## 2022-05-18 RX ADMIN — ROCURONIUM BROMIDE 10 MG: 10 SOLUTION INTRAVENOUS at 13:42

## 2022-05-18 NOTE — ANESTHESIA POSTPROCEDURE EVALUATION
Procedure(s):  ROBOTIC EXTENDED TOTALLY EXTRAPERITONEAL HERNIA REPAIR WITH MESH. general    Anesthesia Post Evaluation        Patient location during evaluation: PACU  Note status: Adequate. Level of consciousness: responsive to verbal stimuli and sleepy but conscious  Pain management: satisfactory to patient  Airway patency: patent  Anesthetic complications: no  Cardiovascular status: acceptable  Respiratory status: acceptable  Hydration status: acceptable  Comments: +Post-Anesthesia Evaluation and Assessment    Patient: Yeimi Castillo MRN: [id number]  SSN: [id number]   YOB: 1960  Age: 59 y.o. Sex: female          Cardiovascular Function/Vital Signs    BP (!) 148/58   Pulse 89   Temp 36.7 °C (98 °F)   Resp 16   SpO2 95%     Patient is status post Procedure(s):  ROBOTIC EXTENDED TOTALLY EXTRAPERITONEAL HERNIA REPAIR WITH MESH. Nausea/Vomiting: Controlled. Postoperative hydration reviewed and adequate. Pain:  Pain Scale 1: Numeric (0 - 10) (05/18/22 1615)  Pain Intensity 1: 6 (05/18/22 1615)   Managed. Neurological Status:   Neuro (WDL): Within Defined Limits (05/18/22 1610)   At baseline. Mental Status and Level of Consciousness: Arousable. Pulmonary Status:   O2 Device: Nasal cannula (05/18/22 1610)   Adequate oxygenation and airway patent. Complications related to anesthesia: None    Post-anesthesia assessment completed. No concerns. I have evaluated the patient and the patient is stable and ready to be discharged from PACU . Signed By: Mita Lau MD    5/18/2022        INITIAL Post-op Vital signs:   Vitals Value Taken Time   /58 05/18/22 1630   Temp 36.7 °C (98 °F) 05/18/22 1610   Pulse 88 05/18/22 1634   Resp 12 05/18/22 1634   SpO2 96 % 05/18/22 1634   Vitals shown include unvalidated device data.

## 2022-05-18 NOTE — PERIOP NOTES
Patient: Alma Hua MRN: [id number]  SSN: [id number]   YOB: 1960  Age: 59 y.o. Sex: female     Patient is status post Procedure(s):  ROBOTIC EXTENDED TOTALLY EXTRAPERITONEAL HERNIA REPAIR WITH MESH. Surgeon(s) and Role:     * Cathy Valladares MD - Primary    Local/Dose/Irrigation:  0.5% BUPIVACAINE W EPI                  Peripheral IV 05/18/22 Left Hand (Active)   Site Assessment Clean, dry, & intact 05/18/22 0905   Phlebitis Assessment 0 05/18/22 0905   Dressing Status Clean, dry, & intact 05/18/22 0905   Dressing Type Transparent 05/18/22 0905   Hub Color/Line Status Blue; Infusing 05/18/22 0905            Airway - Endotracheal Tube 05/18/22 Oral (Active)                   Dressing/Packing:  Incision 05/18/22 Abdomen-Dressing/Treatment: Surgical glue; Other (Comment) (ABDOMINAL BINDER) (05/18/22 1300)    Splint/Cast:  ]    Other:

## 2022-05-18 NOTE — OP NOTES
OPERATIVE NOTE    Date of Procedure: 5/18/2022     Preoperative Diagnosis: INCISIONAL HERNIA  Postoperative Diagnosis: INCARCERATED INCISIONAL HERNIAS, RECTUS DIASTASIS    Procedure: Procedure(s):  ROBOTIC EXTENDED TOTALLY EXTRAPERITONEAL INCARCERATED INCISIONAL HERNIA REPAIR WITH MESH WITH RECTUS PLICATION    Surgeon: Brigid Aceves MD  Assistant(s): AURORA Flores- They were critically important in the exposure and key portions of the case  Surgical Staff: Circ-1: Juju Bey RN  Circ-Relief: Kostas Sutherland RN  Physician Assistant: AURORA Magallon  Scrub Tech-1: Segun Zamarripa    Anesthesia: General   Indications: 17-year-old female with symptomatic incarcerated incisional hernia from previous hysterectomy with large rectus diastases  Findings: 6 cm rectus diastases in the upper midline, multiple hernia defects ranging from 3 cm to 5 cm with Swiss cheese defects of the incision. Incarcerated omentum. Description of Operation: Lexie Angeles was identified in the pre-operative holding area. Informed consent was obtained after a complete discussion of risks, benefits and alternatives to surgery were had with the patient. The patient was brought back to the operating room and placed under general endotracheal anesthesia in the supine position on the operating room table. Both arms were tucked. The patient was then prepped and draped in the usual sterile fashion. A timeout was performed. Preoperatively, the patient was marked for her lateral border of her left rectus. We then marked the left costal margin. We entered the left posterior rectus space using a 5 mm Optiview trocar. Once we achieved the space we placed insufflation into this pocket. We then used the laparoscopic camera to bluntly developed the space. We worked caudad towards the feet to create enough space to place an additional 5 mm trocar along the left most lateral aspect of the rectus.   This was placed without event. We then used a laparoscopic ligasure to further develop the space towards the head and medially. I then used the megadyne to incise the medial portion of the posterior rectus sheath to get into the preperitoneal plane. I used the LigaSure device to take down these adhesions. I continued my dissection towards the right. We identified the posterior rectus sheath. I used the megadyne to incise this. I then used the LigaSure device to develop the posterior rectus space enough to place an 8 mm trocar into the right mid rectus and one 8 mm trocar in the linea alba. We placed the patient in about 15 degrees head down and then docked the robot. Using monopolar scissors I began by working in the preperitoneal space under midline. Using monopolar scissors I carefully dissected free the peritoneal lining from the linea alba. I then started working in the right retrorectus plane. I used the scissors with energy to release the muscle free from the posterior rectus sheath. Medially I released the insertion of the posterior rectus sheath free from the linea alba with about a 1 cm cuff. I then went on the left and performed a similar maneuver releasing posterior attachments between the rectus and the posterior rectus sheath and releasing the left posterior rectus sheath from the linea alba. I was finally able to reach the edge of the hernia defect just cephalad to the umbilicus. I was able to reduce the majority of the incarcerated fat seen blunt dissection and electrocautery. Were multiple Swiss cheese defects and around the umbilicus there was a half centimeter defect and more distally there was a 3 cm defect with multiple other small defects. The rectus diastases was significant at about 5 to 6 cm involving the hernias and more cephalad up towards the ribs.      I continued my dissection down releasing the posterior rectus sheath bilaterally and releasing the rectus abdominis free from the posterior rectus sheath bilaterally. This was done to allow for medial bilateral advancement of the posterior rectus sheath free from the overlying rectus abdominis/anterior to sheath complex therefore performing a bilateral myofascial release. My dissection was continued down into the space of Retzius about 5 cm distal to the most caudad hernia defect where the rectus and joined back together. I visualized bilateral inferior epigastric vessels and released them free from the posterior to sheath. After completing this I noted an excellent posterior sheath release and a uniform dissection bilaterally. I ensured hemostasis and placed the incarcerated omentum back into the abdomen. A few pieces of fat that are no longer vascularized were removed via an upsized 12 mm trocar in the left mid abdomen. Next, I used a 2 0 permanent V-Loc sutures to close the posterior rectus sheath/peritoneum. Next, I used many 0 absorbable V-Loc's to sequentially close the midline anterior fascia and hernia defect. Hernia defects were 3 and 5 cm for the distinct hernias with multiple other small Swiss cheese defects with a 6cm diastases following the hernias and more cephalad that was incorporated into the closure of the rectus plication. This was done with small bites and running the suture back upon itself to ensure a good closure. Dissection of the hernia defects along with the closure of the large rectus diastases and multiple hernia defects took double the typical dissection and closure time secondary to the large defects and complexity. Next, we measured a piece of mesh that was 28 cm long by 25 cm high by 8 cm distally cm. This was a Bard soft mesh. This was cut and introduced into the abdomen. The mesh was positioned appropriately. Next we placed a 19 Thai round DOUG drain into the pelvis via the left mid abdomen assistant site. This was secured with a 2-0 nylon.      We then released gas and undocked the robot. We then used 4-0 Monocryl to close the trocar sites. We injected more local anesthetic. We applied Dermabond. We then applied a binder. At the end of the procedure all instrument, needle, and sponge counts were correct. I was present and scrubbed throughout the entirety of the case. The patient awoke from anesthesia and was extubated without complication and sent to PACU in stable condition. Of note, this procedure required 2 separate, complex myofascial releases and myofascial advancement flaps. I performed bilateral release of the left and right rectus abdominal muscles off of the right and left posterior rectus sheath which allowed for medialization of the rectus musculature/anterior rectus sheath separate from the posterior rectus sheath for closure for the hernia defect and posterior rectus mesh coverage. Estimated Blood Loss: 5 mL    Specimens: * No specimens in log *     Complications: None    Implants:   Implant Name Type Inv.  Item Serial No.  Lot No. LRB No. Used Action   MESH SAL [id number] INGUINAL POLYPR SQ L PORE MFIL SFT KNIT - SNA Mesh [address] SQ L PORE MFIL SFT KNIT NA BARD DAVOL_WD [id number] N/A 1 Implanted         Jammie Bateman MD  Bariatric and General Surgeon  [facility]  5/18/2022

## 2022-05-18 NOTE — INTERVAL H&P NOTE
Update History & Physical    The Patient's History and Physical of 5/2/22 was reviewed with the patient and I examined the patient. There was no change. The surgical site was confirmed by the patient and me. Plan:  The risk, benefits, expected outcome, and alternative to the recommended procedure have been discussed with the patient. Patient understands and wants to proceed with the procedure.     Electronically signed by Estefani Lion MD on 5/18/2022 at 10:07 AM

## 2022-05-18 NOTE — PERIOP NOTES
TRANSFER - OUT REPORT:    Verbal report given to [facility] on Ailyn Cheatham  being transferred to Room 508 for routine progression of care       Report consisted of patients Situation, Background, Assessment and   Recommendations(SBAR). Time Pre op antibiotic given:1107  Anesthesia Stop time: 4040  Mosley Present on Transfer to floor:yes  Order for Mosley on Chart:yes  Discharge Prescriptions with Chart:no    Information from the following report(s) OR Summary, Procedure Summary and Intake/Output was reviewed with the receiving nurse. Opportunity for questions and clarification was provided. Is the patient on 02? YES       L/Min 4       Other     Is the patient on a monitor? NO    Is the nurse transporting with the patient? NO    Surgical Waiting Area notified of patient's transfer from PACU? NO. Called patient's cousin to notify of room assignment. Prasanth Hayden, is not at the hospital      The following personal items collected during your admission accompanied patient upon transfer:   Dental Appliance: Dental Appliances:  At bedside (clothes and shoes returned in PACU)  Vision:    Hearing Aid:    Jewelry: Jewelry: None  Clothing: Clothing:  (clothes and shoes to PACU)  Other Valuables:    Valuables sent to safe:

## 2022-05-18 NOTE — PROGRESS NOTES
Pharmacist Review and Automatic Dose Adjustment of Prophylactic Enoxaparin    *Review reason for admission/hospital problem list*    The reviewing pharmacist has made an adjustment to the ordered enoxaparin dose or converted to UFH per the approved [facility] protocol and table as identified below. Len Han is a 59 y.o. female. No lab exists for component: CREATININE    Estimated Creatinine Clearance: 90.8 mL/min (by C-G formula based on SCr of 0.67 mg/dL). Height:   Ht Readings from Last 1 Encounters:   05/16/22 156.2 cm (61.5\")     Weight:  Wt Readings from Last 1 Encounters:   05/16/22 101.2 kg (223 lb)               Plan: Based upon the patient's weight and renal function, the ordered enoxaparin dose of 40 mg q24h has been changed/converted to 30 mg q12h.        Thank you,  Timothy Vazquez, PHARMD

## 2022-05-18 NOTE — PERIOP NOTES
Notified Dr Vargas Quesada that pt blood sugar was 291. He asked if another anesthesiologist could assess her as he has not been in yet. Per Dr. Jasmin Malloy gave 10 units Regular inuslin.

## 2022-05-18 NOTE — ANESTHESIA PREPROCEDURE EVALUATION
Relevant Problems   RESPIRATORY SYSTEM   (+) Obstructive sleep apnea syndrome      NEUROLOGY   (+) Depression      CARDIOVASCULAR   (+) Essential hypertension      ENDOCRINE   (+) Morbid obesity (HCC)   (+) Type 2 diabetes mellitus with diabetic neuropathy (HCC)   (+) Type 2 diabetes with nephropathy (HCC)       Anesthetic History   No history of anesthetic complications            Review of Systems / Medical History  Patient summary reviewed, nursing notes reviewed and pertinent labs reviewed    Pulmonary  Within defined limits      Sleep apnea           Neuro/Psych   Within defined limits           Cardiovascular  Within defined limits  Hypertension              Exercise tolerance: >4 METS     GI/Hepatic/Renal  Within defined limits              Endo/Other  Within defined limits  Diabetes    Morbid obesity     Other Findings              Physical Exam    Airway  Mallampati: II  TM Distance: > 6 cm  Neck ROM: normal range of motion   Mouth opening: Normal     Cardiovascular  Regular rate and rhythm,  S1 and S2 normal,  no murmur, click, rub, or gallop             Dental  No notable dental hx       Pulmonary  Breath sounds clear to auscultation               Abdominal  GI exam deferred       Other Findings            Anesthetic Plan    ASA: 2  Anesthesia type: general          Induction: Intravenous  Anesthetic plan and risks discussed with: Patient no radiation

## 2022-05-19 LAB
ANION GAP SERPL CALC-SCNC: 7 MMOL/L (ref 5–15)
BASOPHILS # BLD: 0.1 K/UL (ref 0–0.1)
BASOPHILS NFR BLD: 0 % (ref 0–1)
BUN SERPL-MCNC: 20 MG/DL (ref 6–20)
BUN/CREAT SERPL: 19 (ref 12–20)
CALCIUM SERPL-MCNC: 8.5 MG/DL (ref 8.5–10.1)
CHLORIDE SERPL-SCNC: 102 MMOL/L (ref 97–108)
CO2 SERPL-SCNC: 23 MMOL/L (ref 21–32)
CREAT SERPL-MCNC: 1.07 MG/DL (ref 0.55–1.02)
DIFFERENTIAL METHOD BLD: ABNORMAL
EOSINOPHIL # BLD: 0 K/UL (ref 0–0.4)
EOSINOPHIL NFR BLD: 0 % (ref 0–7)
ERYTHROCYTE [DISTWIDTH] IN BLOOD BY AUTOMATED COUNT: 14.2 % (ref 11.5–14.5)
GLUCOSE BLD STRIP.AUTO-MCNC: 340 MG/DL (ref 65–117)
GLUCOSE BLD STRIP.AUTO-MCNC: 351 MG/DL (ref 65–117)
GLUCOSE BLD STRIP.AUTO-MCNC: 372 MG/DL (ref 65–117)
GLUCOSE BLD STRIP.AUTO-MCNC: 416 MG/DL (ref 65–117)
GLUCOSE BLD STRIP.AUTO-MCNC: 431 MG/DL (ref 65–117)
GLUCOSE SERPL-MCNC: 374 MG/DL (ref 65–100)
HCT VFR BLD AUTO: 37.8 % (ref 35–47)
HGB BLD-MCNC: 12.1 G/DL (ref 11.5–16)
IMM GRANULOCYTES # BLD AUTO: 0.1 K/UL (ref 0–0.04)
IMM GRANULOCYTES NFR BLD AUTO: 0 % (ref 0–0.5)
LYMPHOCYTES # BLD: 1.5 K/UL (ref 0.8–3.5)
LYMPHOCYTES NFR BLD: 12 % (ref 12–49)
MCH RBC QN AUTO: 28.4 PG (ref 26–34)
MCHC RBC AUTO-ENTMCNC: 32 G/DL (ref 30–36.5)
MCV RBC AUTO: 88.7 FL (ref 80–99)
MONOCYTES # BLD: 1.1 K/UL (ref 0–1)
MONOCYTES NFR BLD: 9 % (ref 5–13)
NEUTS SEG # BLD: 9.2 K/UL (ref 1.8–8)
NEUTS SEG NFR BLD: 79 % (ref 32–75)
NRBC # BLD: 0 K/UL (ref 0–0.01)
NRBC BLD-RTO: 0 PER 100 WBC
PLATELET # BLD AUTO: 360 K/UL (ref 150–400)
PMV BLD AUTO: 8.9 FL (ref 8.9–12.9)
POTASSIUM SERPL-SCNC: 5.2 MMOL/L (ref 3.5–5.1)
RBC # BLD AUTO: 4.26 M/UL (ref 3.8–5.2)
SERVICE CMNT-IMP: ABNORMAL
SODIUM SERPL-SCNC: 132 MMOL/L (ref 136–145)
WBC # BLD AUTO: 11.8 K/UL (ref 3.6–11)

## 2022-05-19 PROCEDURE — 99024 POSTOP FOLLOW-UP VISIT: CPT | Performed by: SURGERY

## 2022-05-19 PROCEDURE — 97530 THERAPEUTIC ACTIVITIES: CPT

## 2022-05-19 PROCEDURE — 74011636637 HC RX REV CODE- 636/637: Performed by: SURGERY

## 2022-05-19 PROCEDURE — 74011250637 HC RX REV CODE- 250/637: Performed by: SURGERY

## 2022-05-19 PROCEDURE — 77030027138 HC INCENT SPIROMETER -A

## 2022-05-19 PROCEDURE — 80048 BASIC METABOLIC PNL TOTAL CA: CPT

## 2022-05-19 PROCEDURE — 82962 GLUCOSE BLOOD TEST: CPT

## 2022-05-19 PROCEDURE — 99356 PR PROLONGED SVC I/P OR OBS SETTING 1ST HOUR: CPT | Performed by: CLINICAL NURSE SPECIALIST

## 2022-05-19 PROCEDURE — 51798 US URINE CAPACITY MEASURE: CPT

## 2022-05-19 PROCEDURE — 77010033678 HC OXYGEN DAILY

## 2022-05-19 PROCEDURE — 74011250636 HC RX REV CODE- 250/636: Performed by: NURSE PRACTITIONER

## 2022-05-19 PROCEDURE — 74011250636 HC RX REV CODE- 250/636: Performed by: SURGERY

## 2022-05-19 PROCEDURE — 74011636637 HC RX REV CODE- 636/637: Performed by: NURSE PRACTITIONER

## 2022-05-19 PROCEDURE — 96372 THER/PROPH/DIAG INJ SC/IM: CPT

## 2022-05-19 PROCEDURE — G0378 HOSPITAL OBSERVATION PER HR: HCPCS

## 2022-05-19 PROCEDURE — 99233 SBSQ HOSP IP/OBS HIGH 50: CPT | Performed by: CLINICAL NURSE SPECIALIST

## 2022-05-19 PROCEDURE — 85025 COMPLETE CBC W/AUTO DIFF WBC: CPT

## 2022-05-19 PROCEDURE — 36415 COLL VENOUS BLD VENIPUNCTURE: CPT

## 2022-05-19 PROCEDURE — 97161 PT EVAL LOW COMPLEX 20 MIN: CPT

## 2022-05-19 RX ORDER — IBUPROFEN 200 MG
600 TABLET ORAL 4 TIMES DAILY
Status: DISCONTINUED | OUTPATIENT
Start: 2022-05-19 | End: 2022-05-20 | Stop reason: HOSPADM

## 2022-05-19 RX ORDER — ACETAMINOPHEN 325 MG/1
650 TABLET ORAL EVERY 6 HOURS
Status: DISCONTINUED | OUTPATIENT
Start: 2022-05-19 | End: 2022-05-20 | Stop reason: HOSPADM

## 2022-05-19 RX ORDER — HYDROMORPHONE HYDROCHLORIDE 2 MG/1
2-4 TABLET ORAL
Status: DISCONTINUED | OUTPATIENT
Start: 2022-05-19 | End: 2022-05-20 | Stop reason: HOSPADM

## 2022-05-19 RX ORDER — INSULIN GLARGINE 100 [IU]/ML
20 INJECTION, SOLUTION SUBCUTANEOUS
Status: DISCONTINUED | OUTPATIENT
Start: 2022-05-19 | End: 2022-05-19

## 2022-05-19 RX ORDER — DEXTROSE 50 % IN WATER (D50W) INTRAVENOUS SYRINGE
25-50 AS NEEDED
Status: DISCONTINUED | OUTPATIENT
Start: 2022-05-19 | End: 2022-05-20 | Stop reason: HOSPADM

## 2022-05-19 RX ORDER — INSULIN LISPRO 100 [IU]/ML
INJECTION, SOLUTION INTRAVENOUS; SUBCUTANEOUS
Status: DISCONTINUED | OUTPATIENT
Start: 2022-05-19 | End: 2022-05-20

## 2022-05-19 RX ORDER — INSULIN GLARGINE 100 [IU]/ML
40 INJECTION, SOLUTION SUBCUTANEOUS DAILY
Status: DISCONTINUED | OUTPATIENT
Start: 2022-05-19 | End: 2022-05-19

## 2022-05-19 RX ADMIN — HYDROMORPHONE HYDROCHLORIDE 1 MG: 1 INJECTION, SOLUTION INTRAMUSCULAR; INTRAVENOUS; SUBCUTANEOUS at 01:00

## 2022-05-19 RX ADMIN — ACETAMINOPHEN 650 MG: 325 TABLET ORAL at 15:46

## 2022-05-19 RX ADMIN — ACETAMINOPHEN 650 MG: 325 TABLET ORAL at 21:34

## 2022-05-19 RX ADMIN — FLUOXETINE 60 MG: 20 CAPSULE ORAL at 08:43

## 2022-05-19 RX ADMIN — SODIUM CHLORIDE 1000 ML: 9 INJECTION, SOLUTION INTRAVENOUS at 09:31

## 2022-05-19 RX ADMIN — IBUPROFEN 600 MG: 200 TABLET, FILM COATED ORAL at 21:34

## 2022-05-19 RX ADMIN — AMLODIPINE BESYLATE 5 MG: 5 TABLET ORAL at 08:43

## 2022-05-19 RX ADMIN — SODIUM CHLORIDE 75 ML/HR: 9 INJECTION, SOLUTION INTRAVENOUS at 09:32

## 2022-05-19 RX ADMIN — CETIRIZINE HYDROCHLORIDE 10 MG: 10 TABLET, FILM COATED ORAL at 08:43

## 2022-05-19 RX ADMIN — CYCLOBENZAPRINE 10 MG: 10 TABLET, FILM COATED ORAL at 21:35

## 2022-05-19 RX ADMIN — CYCLOBENZAPRINE 10 MG: 10 TABLET, FILM COATED ORAL at 15:46

## 2022-05-19 RX ADMIN — HYDROMORPHONE HYDROCHLORIDE 1 MG: 1 INJECTION, SOLUTION INTRAMUSCULAR; INTRAVENOUS; SUBCUTANEOUS at 13:21

## 2022-05-19 RX ADMIN — ASPIRIN 81 MG: 81 TABLET, COATED ORAL at 08:43

## 2022-05-19 RX ADMIN — SODIUM CHLORIDE 1000 ML: 9 INJECTION, SOLUTION INTRAVENOUS at 17:30

## 2022-05-19 RX ADMIN — INSULIN GLARGINE 40 UNITS: 100 INJECTION, SOLUTION SUBCUTANEOUS at 09:38

## 2022-05-19 RX ADMIN — IBUPROFEN 600 MG: 200 TABLET, FILM COATED ORAL at 08:43

## 2022-05-19 RX ADMIN — IBUPROFEN 600 MG: 200 TABLET, FILM COATED ORAL at 13:22

## 2022-05-19 RX ADMIN — HYDROMORPHONE HYDROCHLORIDE 1 MG: 1 INJECTION, SOLUTION INTRAMUSCULAR; INTRAVENOUS; SUBCUTANEOUS at 05:56

## 2022-05-19 RX ADMIN — ENOXAPARIN SODIUM 30 MG: 100 INJECTION SUBCUTANEOUS at 21:35

## 2022-05-19 RX ADMIN — Medication 20 UNITS: at 16:49

## 2022-05-19 RX ADMIN — ENOXAPARIN SODIUM 30 MG: 100 INJECTION SUBCUTANEOUS at 11:27

## 2022-05-19 RX ADMIN — Medication 10 UNITS: at 22:39

## 2022-05-19 RX ADMIN — ACETAMINOPHEN 650 MG: 325 TABLET ORAL at 09:28

## 2022-05-19 RX ADMIN — HYDROMORPHONE HYDROCHLORIDE 2 MG: 2 TABLET ORAL at 19:38

## 2022-05-19 RX ADMIN — HYDROMORPHONE HYDROCHLORIDE 1 MG: 1 INJECTION, SOLUTION INTRAMUSCULAR; INTRAVENOUS; SUBCUTANEOUS at 08:43

## 2022-05-19 RX ADMIN — CYCLOBENZAPRINE 10 MG: 10 TABLET, FILM COATED ORAL at 08:43

## 2022-05-19 RX ADMIN — HYDROMORPHONE HYDROCHLORIDE 2 MG: 2 TABLET ORAL at 15:46

## 2022-05-19 NOTE — PROGRESS NOTES
Surgery Progress Note    5/19/2022    Admit Date: 5/18/2022    CC: Abd pain    POD: 1 Robotic ETEP IHR    Subjective:     Painful. Blood sugars high last night. Constitutional: No fever or chills  Neurologic: No headache  Eyes: No scleral icterus or irritated eyes  Nose: No nasal pain or drainage  Mouth: No oral lesions or sore throat  Cardiac: No palpations or chest pain  Pulmonary: No cough or shortness of breath  Gastrointestinal: Abd pain, No nausea, emesis, diarrhea, or constipation  Genitourinary: No dysuria  Musculoskeletal: No muscle or joint tenderness  Skin: No rashes or lesions  Psychiatric: No anxiety or depressed mood    Objective:     Visit Vitals  BP (!) 143/81 (BP 1 Location: Left upper arm, BP Patient Position: At rest)   Pulse (!) 111   Temp 99 °F (37.2 °C)   Resp 18   SpO2 95%       General: No acute distress, conversant  Eyes: PERRLA, no scleral icterus  HENT: Normocephalic without oral lesions  Neck: Trachea midline without LAD  Cardiac: Normal pulse rate and rhythm  Pulmonary: Symmetric chest rise with normal effort  GI: Soft, ATTP, binder in place  Skin: Warm without rash  Extremities: No edema or joint stiffness  Psych: Appropriate mood and affect    Espino in place    Labs, vital signs, and I/O reviewed. Assessment:     58 y/o F with s/p Robotic ETEP IHR doing fair    Plan:     PRN pain control. Add NSAIDS. Dilaudid. Flexeril  NS bolus. Slight OMAIRA. Cont IVF  DC espino  Hyperglycemia. Lantus ordered to help control. Cont SSI  Hyperkalemia- bolus given. Check in AM. Insulin should help this too. Reg diet  Hgb stable.  Lovenox  PT eval to teach how to get around  Cont floor      Anibal Donald MD  Bariatric and General Surgeon  [facility]

## 2022-05-19 NOTE — PROGRESS NOTES
Blood sugars not well controlled. Diabetes treatment center consulted recommendations made. As recommended by diabetes treatment center to start you 500 3 times daily with meals. I was contacted by pharmacy to change order to NPH. Order changed to NPH. Last blood sugar check at 430 blood sugar 431. NPH was just given approximately 20 minutes ago. Hospitalist consult placed per Dr. Vaibhav Johnson for diabetes management. .  Also, patient's Mosley was removed at 930 and has been unable to void. Bladder scan to 26 cc. Placed an order for normal saline bolus. Dr. Vaibhav Johnson aware of all of the above.   Ebony Rivas, NP

## 2022-05-19 NOTE — PROGRESS NOTES
Problem: Mobility Impaired (Adult and Pediatric)  Goal: *Acute Goals and Plan of Care (Insert Text)  Description: FUNCTIONAL STATUS PRIOR TO ADMISSION: Patient was independent and active without use of DME.    HOME SUPPORT PRIOR TO ADMISSION: The patient lived alone with no local support. Physical Therapy Goals  Initiated 5/19/2022  1. Patient will move from supine to sit and sit to supine  and roll side to side in bed with modified independence within 7 day(s). 2.  Patient will transfer from bed to chair and chair to bed with modified independence using the least restrictive device within 7 day(s). 3.  Patient will perform sit to stand with modified independence within 7 day(s). 4.  Patient will ambulate with modified independence for 150 feet with the least restrictive device within 7 day(s). Outcome: Progressing Towards Goal   PHYSICAL THERAPY EVALUATION  Patient: Ailyn Cheatham (59 y.o. female)  Date: 5/19/2022  Primary Diagnosis: Ventral hernia [K43.9]  History of incisional hernia repair [Z98.890, Z87.19]  Procedure(s) (LRB):  ROBOTIC EXTENDED TOTALLY EXTRAPERITONEAL HERNIA REPAIR WITH MESH (N/A) 1 Day Post-Op   Precautions:          ASSESSMENT  Based on the objective data described below, the patient presents with decreased mobility and endurance compared to baseline after incarcerated hernia repair, POD1. She was independent without limitations prior to surgery and reports that she lives alone with intermittent assistance available if needed. At this time, she is needing contact guard to min assist for basic mobility using walker for balance and pain control for transfers. She was able to tolerate room air with limited activity, but HR in the 110s and SpO2 93%. Encouraged incentive spirometry and RN is aware. Note that she does use CPAP at home consistently. She was not able to ambulate beyond transfer to the chair due to fatigue and pain.     Expect that she will progress well with her mobility, but since she lives alone and is apprehensive about her mobility, a RW could be beneficial for home use, at least temporarily. Reviewed safety considerations for hospital stay and plan of care, and we will follow up tomorrow to assess ambulation in the halls to ensure she is safe for possible D/C home tomorow. Current Level of Function Impacting Discharge (mobility/balance): contact guard to min assist for transfers    Functional Outcome Measure: The patient scored Total: 55/100 on the Barthel Index outcome measure which is indicative of being moderately impaired in basic self-care. Other factors to consider for discharge: none     Patient will benefit from skilled therapy intervention to address the above noted impairments. PLAN :  Recommendations and Planned Interventions: bed mobility training, transfer training, gait training, patient and family training/education, and therapeutic activities      Frequency/Duration: Patient will be followed by physical therapy:  5 times a week to address goals. Recommendation for discharge: (in order for the patient to meet his/her long term goals)  To be determined: most likely none    This discharge recommendation:  Has not yet been discussed the attending provider and/or case management    IF patient discharges home will need the following DME: rolling walker         SUBJECTIVE:   Patient stated I just feel shaky today, this is not my first rodeo.     OBJECTIVE DATA SUMMARY:   HISTORY:    Past Medical History:   Diagnosis Date    breast 2008    breast; right DCIS    Depression     Incisional hernia 12/17/2012    TO RIGHT AND BELOW NAVAL    Microalbuminuria     Migraine     Osteopenia 4/9/2015    Other and unspecified hyperlipidemia 12/4/2009    Sleep apnea     c-pap; nightly    Type 2 diabetes mellitus with diabetic neuropathy ([facility] [address] 4/27/2018    Type 2 diabetes with nephropathy ([facility] [address] 4/27/2018    Unspecified essential hypertension 12/4/2009 Past Surgical History:   Procedure Laterality Date    HX BREAST BIOPSY Bilateral 2008    HX BREAST RECONSTRUCTION  -    HX CATARACT REMOVAL Bilateral 2011    HX  SECTION      X 1    HX CHOLECYSTECTOMY      HX GI      COLONOSCOPY    HX HEENT      cleft palate and lip repair    HX MASTECTOMY Bilateral 2008    HX ORTHOPAEDIC  1986    right knee     ARTHROSCOPIC    HX ORTHOPAEDIC  12    right RTC repair shoulder    HX ORTHOPAEDIC      NECK FUSION    HX ORTHOPAEDIC      L THUMB    HX WALESKA AND BSO      TN RECONST CLEFT PALATE,SOFT/HARD      TN SINUS SURGERY PROC UNLISTED  X2       Personal factors and/or comorbidities impacting plan of care: as noted above    Home Situation  # Steps to Enter: 1  One/Two Story Residence: One story  Living Alone: Yes  Support Systems: Other Family Member(s),Friend/Neighbor  Patient Expects to be Discharged to[de-identified] Home  Current DME Used/Available at Home:  (none)    EXAMINATION/PRESENTATION/DECISION MAKING:   Critical Behavior:  Neurologic State: Alert           Hearing:     Skin:  abd binder in place, DOUG drain in place  Edema: none  Range Of Motion:  AROM: Within functional limits                       Strength:    Strength: Within functional limits                    Tone & Sensation:   Tone: Normal              Sensation: Intact               Coordination:  Coordination: Within functional limits  Vision:      Functional Mobility:  Bed Mobility:     Supine to Sit:  (pt sitting on arrival)  Sit to Supine:  (up in chair after)     Transfers:  Sit to Stand: Contact guard assistance; Additional time; Adaptive equipment  Stand to Sit: Contact guard assistance; Adaptive equipment; Additional time        Bed to Chair: Contact guard assistance; Adaptive equipment; Additional time              Balance:   Sitting: Intact; Without support  Standing: Intact; With support (with hands on RW)  Ambulation/Gait Training: Stairs: Therapeutic Exercises:       Functional Measure:  Barthel Index:    Bathin  Bladder: 10  Bowels: 10  Groomin  Dressin  Feeding: 10  Mobility: 0  Stairs: 0  Toilet Use: 5  Transfer (Bed to Chair and Back): 10  Total: 55/100       The Barthel ADL Index: Guidelines  1. The index should be used as a record of what a patient does, not as a record of what a patient could do. 2. The main aim is to establish degree of independence from any help, physical or verbal, however minor and for whatever reason. 3. The need for supervision renders the patient not independent. 4. A patient's performance should be established using the best available evidence. Asking the patient, friends/relatives and nurses are the usual sources, but direct observation and common sense are also important. However direct testing is not needed. 5. Usually the patient's performance over the preceding 24-48 hours is important, but occasionally longer periods will be relevant. 6. Middle categories imply that the patient supplies over 50 per cent of the effort. 7. Use of aids to be independent is allowed. Score Interpretation (from [address] [address])    Independent   60-79 Minimally independent   40-59 Partially dependent   20-39 Very dependent   <20 Totally dependent     -Antonette Mora., Barthel, D.W. (1965). Functional evaluation: the Barthel Index. [facility] ([address] (1997). The Barthel activities of daily living index: self-reporting versus actual performance in the old (> or = 75 years). Journal of [address] [place] [address] 45(7), [facility], J.J.MJENNIFER, Antwon Rowley., Norman Araiza. (1999). Measuring the change in disability after inpatient rehabilitation; comparison of the responsiveness of the Barthel Index and Functional [place] Measure. Journal of Neurology, Neurosurgery, and Psychiatry, 66(4), 582-673.   JEREL Enriquez, Eleuterio Joseph, JAY, & Sahil Hart M.A. (2004) Assessment of post-stroke quality of life in cost-effectiveness studies: The usefulness of the Barthel Index and the EuroQoL-5D. Quality of Life Research, 15, 215-84        Physical Therapy Evaluation Charge Determination   History Examination Presentation Decision-Making   HIGH Complexity :3+ comorbidities / personal factors will impact the outcome/ POC  MEDIUM Complexity : 3 Standardized tests and measures addressing body structure, function, activity limitation and / or participation in recreation  MEDIUM Complexity : Evolving with changing characteristics  LOW Complexity : FOTO score of       Based on the above components, the patient evaluation is determined to be of the following complexity level: LOW     Pain Rating: Moderate incisional pain    Activity Tolerance:   Fair    After treatment patient left in no apparent distress:   Sitting in chair, Call bell within reach, and Caregiver / family present    COMMUNICATION/EDUCATION:   The patients plan of care was discussed with: Registered nurse. Fall prevention education was provided and the patient/caregiver indicated understanding., Patient/family have participated as able in goal setting and plan of care. , and Patient/family agree to work toward stated goals and plan of care.     Thank you for this referral.  Aye Jaimes, PT   Time Calculation: 18 mins

## 2022-05-19 NOTE — DIABETES MGMT
[address] [place] [address]    CLINICAL NURSE SPECIALIST CONSULT     Initial Presentation   Brigido Solano is a 59 y.o. female admitted s/p ROBOTIC EXTENDED TOTALLY EXTRAPERITONEAL INCARCERATED INCISIONAL HERNIA REPAIR WITH MESH WITH RECTUS PLICATION    HX:   Past Medical History:   Diagnosis Date    breast 2008    breast; right DCIS    Depression     Incisional hernia 12/17/2012    TO RIGHT AND BELOW NAVAL    Microalbuminuria     Migraine     Osteopenia 4/9/2015    Other and unspecified hyperlipidemia 12/4/2009    Sleep apnea     c-pap; nightly    Type 2 diabetes mellitus with diabetic neuropathy ([facility] [address] 4/27/2018    Type 2 diabetes with nephropathy ([facility] [address] 4/27/2018    Unspecified essential hypertension 12/4/2009        INITIAL DX:   Ventral hernia [K43.9]  History of incisional hernia repair [G32.798, Z87.19]     Current Treatment     TX: ROBOTIC EXTENDED TOTALLY EXTRAPERITONEAL INCARCERATED INCISIONAL HERNIA REPAIR WITH MESH WITH RECTUS PLICATION    Consulted by Provider for advanced diabetes nursing assessment and care for:   [x] Inpatient management strategy  [x] Home management assessment    Hospital Course   Clinical progress has been complicated by hyperglycemia post op. Diabetes History   DM2- A1C 7.8%- Followed by [facility]-noted recent visit 4/13/22-Dr. Lauri Rogers    Diabetes-related Medical History  Acute complications  Persistent hyperglycemia  Neurological complications  Peripheral neuropathy  Microvascular disease  denies  Macrovascular disease  denies  Other associated conditions     HTN/ morbid obesity/depression    Diabetes Medication History  Key Antihyperglycemic Medications             metFORMIN ER (GLUCOPHAGE XR) 500 mg tablet (Taking) Take 2 Tabs by mouth two (2) times a day. insulin CONCENTRATED regular (HUMULIN R U-500) 500 unit/mL soln (Taking) by SubCUTAneous route.  70 units before breastfast and lunch, 30 units before bed Diabetes self-management practices:   Eating pattern-   [x] Eating a carbohydrate-controlled mealplan    Physical activity pattern-prior to this surgery, was exercising 4 times/week      Monitoring pattern-FreeStyle CGM, scans 5 times/day, BG trends 161-250s     Taking medications pattern  [x] Consistent administration  [x] Affordable  Social determinants of health impacting diabetes self-management practices   Struggling with anxiety and/or depression and Concerned that you need to know more about how to stay healthy with diabetes  Overall evaluation:    [x] Striving to achieve A1c target with drug therapy & self-care practices    Subjective   I have struggled with weight loss for a very long time, and desire to achieve  weight loss through bariatric surgery at some point\"  PO intake since surgery <25%  Objective   Physical exam  General Obese   Female  in no acute distress. Conversant and cooperative  Neuro  Alert, oriented   Vital Signs   Visit Vitals  BP (!) 143/81 (BP 1 Location: Left upper arm, BP Patient Position: At rest)   Pulse (!) 114   Temp 99 °F (37.2 °C)   Resp 18   SpO2 96%     Skin  Warm and dry. Heart   Regular rate and rhythm. No murmurs, rubs or gallops  Lungs  Clear to auscultation without rales or rhonchi  Extremities No foot wounds    Diabetic foot exam: reports \"mild neuropathy\"        Laboratory  Recent Labs     05/19/22  0508   *   AGAP 7   WBC 11.8*   CREA 1.07*   GFRNA 52*       Factors impacting BG management  Factor Dose Comments   Nutrition:  Standard meals     60 grams/meal      Pain Post op    Infection NONE    Other:   Kidney function  Liver function     GFR 52      Blood glucose pattern      Significant diabetes-related events over the past 24-72 hours  S/p hernia repair surgery, Admitted 5/18- A1C 7.8%  Persistent hyperglycemia since surgery, noted BID Lantus ordered, 60 units daily in divided doses.    PO intake since surgery, <25%    Assessment and Plan Nursing Diagnosis Risk for unstable blood glucose pattern   Nursing Intervention Domain 5256 Decision-making Support   Nursing Interventions Examined current inpatient diabetes/blood glucose control   Explored factors facilitating and impeding inpatient management  Explored corrective strategies with patient and responsible inpatient provider   Informed patient of rational for insulin strategy while hospitalized     Nursing Diagnosis 43293 Ineffective Health Management   Nursing Intervention Domain 5259 Decision-makingSupport   Nursing Interventions Identified diabetes self-management practices impeding diabetes control  Discussed diabetes survival skills related to  1. Healthy Plate eating plan; given handouts  2. Role of physical activity in improving insulin sensitivity and action  3. Procedure for blood glucose monitoring & options for low-cost products available from [facility]   4. Medications plan at discharge     Evaluation   Yari Nick is a 58 yo  female who has been living with DM2 for many years. Current A1C 7.8%. S/p hernia repair surgery on 5/18/22 and progressing post op. Diabetes mgmt consulted for inpatient BG mgmt -  Since surgery has been significantly hyperglycemic with BG >250-300s. Currently on BID Lantus and correctional insulin with minimal impact on BG. Discussed patient's U-500 PTA doses and together we came up with appropriate U-500 dosing while hospitalized and factored in low PO intake. This will provide better BG control while hospitalized -BG targets remain 140-180 while inpatient. Reached out to patient's endocrinologist to confer re: U-500 dosing -awaiting call back. Patient desires to eventually pursue bariatric surgery to achieve desired weight loss. She has dieted and exercised over the years and achieved a weight loss goal to where she came off all of her diabetes medications.   She verbalizes compliance with BG monitoring via CGM, and insulin administration- she states some days \"I wing it\" with my insulin dosing depending on what I eat\" She has infrequent low BG at home. Conferred with pharmacy, Dr. Leo Cui , and with patient's RN re: dosing/ timing. Recommendations   1. [x] Use of Subcutaneous Insulin Order set (9951)  Insulin Dosing Specific recommendation   START U-500 TID with meals                              (Based on weight, BMI & GFR) 35 units with breakfast  35 units with Lunch  15 units with dinner-will START tonight @1800 This is a 50% reduction from PTA U-500 dosing     2. All other insulins DISCONTINUED   Billing Code(s)   44926  71059    Before making these care recommendations, I personally reviewed the hospitalization record, including notes, laboratory & diagnostic data and current medications, and examined the patient at the bedside (circumstances permitting) before making care recommendations. More than fifty (50) percent of the time was spent in patient counseling and/or care coordination.   Total minutes: 3515 JAXON Barron  Diabetes Clinical Nurse Specialist  [facility]  Access via [facility]as

## 2022-05-19 NOTE — PROGRESS NOTES
Bedside shift change report given to Ariella Gray RN (oncoming nurse) by Fred Martinez RN (offgoing nurse). Report included the following information SBAR, Kardex, Intake/Output, MAR, Recent Results and Med Rec Status.

## 2022-05-19 NOTE — PROGRESS NOTES
Shar Wallis provided to patient/representative with verbal explanation of the notice. Time allotted for questions regarding the notice. Patient /representative provided a completed copy of the VOON notice. Copy placed on bedside chart.   Mo Jimenes, Care Management Assistant

## 2022-05-19 NOTE — PROGRESS NOTES
TRANSFER - IN REPORT:    Verbal report received from [address] [place] [address] Street, RN(name) on Reunion  being received from [employer]) for routine progression of care      Report consisted of patients Situation, Background, Assessment and   Recommendations(SBAR). Information from the following report(s) SBAR, Kardex, OR Summary, Procedure Summary, Intake/Output, MAR, Accordion, Recent Results, Med Rec Status and Cardiac Rhythm NSR-ST was reviewed with the receiving nurse. Opportunity for questions and clarification was provided. Assessment completed upon patients arrival to unit and care assumed. Bedside shift change report given to Humza Mcnair RN (oncoming nurse) by Swetha Arellano RN (offgoing nurse).  Report included the following information SBAR, Kardex, OR Summary, Procedure Summary, Intake/Output, MAR, Accordion, Recent Results, Med Rec Status and Cardiac Rhythm NSR-ST.

## 2022-05-19 NOTE — PROGRESS NOTES
Transition of Care: hopefully home with f/u with specialist/pcp and new walker (delievered to her at bedside on 5/19)    Transport Plan: TBD; likely in car with family    RUR: n/a    DX: ventral hernia/scheduled surgery    Main contact is madonna Christiansen- [phone]    Discharge pending:  -patient is POD#1 hernia repair  -pending medical progress  -pending PT progress    0: this CM noted CM order for walker; this CM met with pleasant patient at bedside; she is alert and oriented x 4; patient lives at stated address alone; she has one step to enter home and it is one level; patient is normally independent in her ADLs; patient confirms her insurance, her pcp and preferred pharmacy is [facility] [address] ; this CM sent referral to [facility] for walker; [facility] accepted and this CM delivered walker to patient at bedside    Reason for Admission:  Ventral hernia/scheduled procedure                     RUR Score:       n/a              Plan for utilizing home health:     Likely none     PCP: First and Last name:  Mami Pandya MD     Name of Practice:    Are you a current patient: Yes/No: yes   Approximate date of last visit:    Can you participate in a virtual visit with your PCP: unknown                    Current Advanced Directive/Advance Care Plan: Full Code      Healthcare Decision Maker:   Click here to complete [employer] including selection of the Healthcare Decision Maker Relationship (ie \"Primary\")                             Transition of Care Plan:   Likely home with f/u with specialist/pcp and new walker    Care Management Interventions  PCP Verified by CM: Yes  Mode of Transport at Discharge:  Other (see comment) (TBD; likely in car with family)  Support Systems: Other Family Member(s)  Discharge Location  Patient Expects to be Discharged to[de-identified] Other: (TBD; likely home with f/uwith specialist/pcp)     CM following  Julieth Barron RN, CRM

## 2022-05-20 VITALS
OXYGEN SATURATION: 95 % | DIASTOLIC BLOOD PRESSURE: 74 MMHG | HEART RATE: 102 BPM | SYSTOLIC BLOOD PRESSURE: 114 MMHG | TEMPERATURE: 98.1 F | RESPIRATION RATE: 16 BRPM

## 2022-05-20 LAB
ANION GAP SERPL CALC-SCNC: 7 MMOL/L (ref 5–15)
BUN SERPL-MCNC: 19 MG/DL (ref 6–20)
BUN/CREAT SERPL: 18 (ref 12–20)
CALCIUM SERPL-MCNC: 8.3 MG/DL (ref 8.5–10.1)
CHLORIDE SERPL-SCNC: 105 MMOL/L (ref 97–108)
CO2 SERPL-SCNC: 22 MMOL/L (ref 21–32)
CREAT SERPL-MCNC: 1.03 MG/DL (ref 0.55–1.02)
ERYTHROCYTE [DISTWIDTH] IN BLOOD BY AUTOMATED COUNT: 14.6 % (ref 11.5–14.5)
GLUCOSE BLD STRIP.AUTO-MCNC: 309 MG/DL (ref 65–117)
GLUCOSE SERPL-MCNC: 311 MG/DL (ref 65–100)
HCT VFR BLD AUTO: 34.4 % (ref 35–47)
HGB BLD-MCNC: 11 G/DL (ref 11.5–16)
MCH RBC QN AUTO: 28.6 PG (ref 26–34)
MCHC RBC AUTO-ENTMCNC: 32 G/DL (ref 30–36.5)
MCV RBC AUTO: 89.4 FL (ref 80–99)
NRBC # BLD: 0 K/UL (ref 0–0.01)
NRBC BLD-RTO: 0 PER 100 WBC
PLATELET # BLD AUTO: 282 K/UL (ref 150–400)
PMV BLD AUTO: 9 FL (ref 8.9–12.9)
POTASSIUM SERPL-SCNC: 4.5 MMOL/L (ref 3.5–5.1)
RBC # BLD AUTO: 3.85 M/UL (ref 3.8–5.2)
SERVICE CMNT-IMP: ABNORMAL
SODIUM SERPL-SCNC: 134 MMOL/L (ref 136–145)
WBC # BLD AUTO: 10.6 K/UL (ref 3.6–11)

## 2022-05-20 PROCEDURE — 99024 POSTOP FOLLOW-UP VISIT: CPT | Performed by: SURGERY

## 2022-05-20 PROCEDURE — 74011636637 HC RX REV CODE- 636/637: Performed by: NURSE PRACTITIONER

## 2022-05-20 PROCEDURE — 85027 COMPLETE CBC AUTOMATED: CPT

## 2022-05-20 PROCEDURE — 82962 GLUCOSE BLOOD TEST: CPT

## 2022-05-20 PROCEDURE — 77010033678 HC OXYGEN DAILY

## 2022-05-20 PROCEDURE — 74011250637 HC RX REV CODE- 250/637: Performed by: SURGERY

## 2022-05-20 PROCEDURE — 80048 BASIC METABOLIC PNL TOTAL CA: CPT

## 2022-05-20 PROCEDURE — 36415 COLL VENOUS BLD VENIPUNCTURE: CPT

## 2022-05-20 PROCEDURE — G0378 HOSPITAL OBSERVATION PER HR: HCPCS

## 2022-05-20 RX ORDER — CYCLOBENZAPRINE HCL 10 MG
10 TABLET ORAL
Qty: 20 TABLET | Refills: 0 | Status: SHIPPED | OUTPATIENT
Start: 2022-05-20 | End: 2022-06-06 | Stop reason: ALTCHOICE

## 2022-05-20 RX ORDER — INSULIN LISPRO 100 [IU]/ML
0.1 INJECTION, SOLUTION INTRAVENOUS; SUBCUTANEOUS
Status: CANCELLED | OUTPATIENT
Start: 2022-05-20

## 2022-05-20 RX ORDER — INSULIN GLARGINE 100 [IU]/ML
0.2 INJECTION, SOLUTION SUBCUTANEOUS
Status: CANCELLED | OUTPATIENT
Start: 2022-05-20

## 2022-05-20 RX ORDER — IBUPROFEN 200 MG
600 TABLET ORAL
Qty: 60 TABLET | Refills: 0 | Status: SHIPPED | OUTPATIENT
Start: 2022-05-20 | End: 2022-07-29

## 2022-05-20 RX ORDER — POLYETHYLENE GLYCOL 3350 17 G/17G
17 POWDER, FOR SOLUTION ORAL DAILY
Qty: 14 PACKET | Refills: 1 | Status: SHIPPED | OUTPATIENT
Start: 2022-05-20 | End: 2022-06-06 | Stop reason: ALTCHOICE

## 2022-05-20 RX ORDER — MAGNESIUM SULFATE 100 %
4 CRYSTALS MISCELLANEOUS AS NEEDED
Status: CANCELLED | OUTPATIENT
Start: 2022-05-20

## 2022-05-20 RX ORDER — HYDROMORPHONE HYDROCHLORIDE 2 MG/1
2 TABLET ORAL
Qty: 18 TABLET | Refills: 0 | Status: SHIPPED | OUTPATIENT
Start: 2022-05-20 | End: 2022-05-23

## 2022-05-20 RX ADMIN — FLUOXETINE 60 MG: 20 CAPSULE ORAL at 09:57

## 2022-05-20 RX ADMIN — ASPIRIN 81 MG: 81 TABLET, COATED ORAL at 09:58

## 2022-05-20 RX ADMIN — AMLODIPINE BESYLATE 5 MG: 5 TABLET ORAL at 09:58

## 2022-05-20 RX ADMIN — ACETAMINOPHEN 650 MG: 325 TABLET ORAL at 09:56

## 2022-05-20 RX ADMIN — CYCLOBENZAPRINE 10 MG: 10 TABLET, FILM COATED ORAL at 09:57

## 2022-05-20 RX ADMIN — Medication 40 UNITS: at 07:00

## 2022-05-20 RX ADMIN — HYDROMORPHONE HYDROCHLORIDE 2 MG: 2 TABLET ORAL at 09:57

## 2022-05-20 RX ADMIN — ACETAMINOPHEN 650 MG: 325 TABLET ORAL at 03:49

## 2022-05-20 RX ADMIN — CETIRIZINE HYDROCHLORIDE 10 MG: 10 TABLET, FILM COATED ORAL at 09:58

## 2022-05-20 NOTE — PROGRESS NOTES
Pt's blood sugar is 416. RN spoke with Dr. Kash Bowman who instructed RN to give 10 units of Humalog.

## 2022-05-20 NOTE — DISCHARGE INSTRUCTIONS
Discharge Instructions for General Surgery Patients     OK to shower. Old drain site will lose for about a week or so. Cover site with dry clean gauze or Band-Aid until it scabs over. Wean binder for comfort as needed. 1. Do not lift any objects weighing more than 15 pounds for 2 weeks. 2. Do not do any housework including vacuuming, scrubbing or yardwork for 4 weeks. 3. Do not drive or operate machinery while taking sedating or narcotic medications. 4. Post operative pain is expected. Try to wean off narcotics as soon as able and take tylenol or NSAIDS. Do not take tylenol with Norco or Percocet as this may harm your liver. No NSAIDS if you are bariatric surgery patient. 5. You may walk as desired and go up and down stairs as needed. Walking is encouraged. 6. You may shower the day after surgery. Do not take tub baths, swim or use hot tubs for 2 weeks. Pat dry wounds after with a towel. 7. Leave glue on wounds. It will fall off with time. Do not scrub around incisions. If redness develops around the glue okay to peel off in hot shower. 8. Regular diet. Take Miralax once or twice a day as needed for constipation. 9. Follow up with provider as scheduled. 10. If you experience fever (greater than 101.5), chills, vomiting or redness or drainage at surgical site, please contact your surgeons office. If you have further questions or concerns, please call your surgeons office at [phone].

## 2022-05-20 NOTE — CONSULTS
[address] [facility] Adult  Hospitalist Group    Hospitalist Consult  Primary Care Provider: José Miguel Orozco MD  Consult requested by:  [facility]    Subjective: Lita Skiff is a 59 y.o. female who presents with incarcerated incisional hernia, and rectus diastisis. Patient is currently admitted to the general surgery service and is postop day 1 status post hernia repair. We are asked to see the patient in consult to assist in managing DM II. Review of patient's chart did not reveal any intraoperative or postoperative complications. At this time the patient has no complaints. She states he/she manages his/her diabetes mellitus at home with humulin and metformin and her blood sugars since arrival to the floor have been elevated to the 400's. She denies any headaches or dizziness. Denies any cough, chest pain, shortness of breath. Denies fever or chills. Denies nausea, vomiting, diarrhea, constipation. Review of Systems:    A comprehensive review of systems was negative except for that written in the History of Present Illness.      Past Medical History:   Diagnosis Date    breast 2008    breast; right DCIS    Depression     Incisional hernia 2012    TO RIGHT AND BELOW NAVAL    Microalbuminuria     Migraine     Osteopenia 2015    Other and unspecified hyperlipidemia 2009    Sleep apnea     c-pap; nightly    Type 2 diabetes mellitus with diabetic neuropathy ([address] 2018    Type 2 diabetes with nephropathy ([address] 2018    Unspecified essential hypertension 2009      Past Surgical History:   Procedure Laterality Date    HX BREAST BIOPSY Bilateral     HX BREAST RECONSTRUCTION  -    HX CATARACT REMOVAL Bilateral     HX  SECTION      X 1    HX CHOLECYSTECTOMY      HX GI      COLONOSCOPY    HX HEENT      cleft palate and lip repair    HX MASTECTOMY Bilateral 2008    HX ORTHOPAEDIC  1986    right knee     ARTHROSCOPIC    HX ORTHOPAEDIC  12/31/14    right RTC repair shoulder    HX ORTHOPAEDIC      NECK FUSION    HX ORTHOPAEDIC  2021    L THUMB    HX WALESKA AND BSO      [place] RECONST CLEFT PALATE,SOFT/HARD  1958    [place] SINUS SURGERY PROC UNLISTED  X2     Prior to Admission medications    Medication Sig Start Date End Date Taking? Authorizing Provider   multivitamin (ONE A DAY) tablet Take 1 Tablet by mouth daily. Yes Provider, Historical   calcium citrate 200 mg (950 mg) tablet Take 400 mg by mouth two (2) times a day. Yes Provider, Historical   naproxen sodium (Aleve) 220 mg cap Take  by mouth as needed. Yes Provider, Historical   FLUoxetine (PROzac) 20 mg capsule TAKE 3 CAPSULES BY MOUTH EVERY DAY  Patient taking differently: every morning. TAKE 3 CAPSULES BY MOUTH EVERY DAY 4/19/22  Yes Riaz Chaudhari MD   FreeStyle Sarath 2 Sensor kit  4/18/22  Yes Provider, Historical   BD Insulin Syringe U-500 1/2 mL 31 gauge x 15/64\" syrg USE AS DIRECTED 3 TIMES A DAY 2/7/22  Yes Provider, Historical   losartan-hydroCHLOROthiazide (HYZAAR) 100-25 mg per tablet TAKE 1 TABLET BY MOUTH EVERY DAY 3/24/22  Yes Lloyd COLON NP   rosuvastatin (CRESTOR) 20 mg tablet TAKE 1 TABLET BY MOUTH EVERY DAY  Patient taking differently: nightly. 3/24/22  Yes Riaz Chaudhari MD   amLODIPine (NORVASC) 5 mg tablet Take 1 Tablet by mouth daily. Patient taking differently: Take 5 mg by mouth every morning. 2/7/22  Yes Riaz Chaudhari MD   ketotifen (ZADITOR) 0.025 % (0.035 %) ophthalmic solution Administer 1 Drop to both eyes as needed. Yes Provider, Historical   fluticasone (FLONASE ALLERGY RELIEF) 50 mcg/actuation nasal spray 2 Sprays by Both Nostrils route daily. Patient taking differently: 1 Spray by Both Nostrils route daily. 1/9/19  Yes Riaz Chaudhari MD   metFORMIN ER (GLUCOPHAGE XR) 500 mg tablet Take 2 Tabs by mouth two (2) times a day. 12/3/18  Yes Riaz Chaudhari MD   Cetirizine (ZYRTEC) 10 mg cap Take  by mouth daily.    Yes Provider, Historical   insulin CONCENTRATED regular (HUMULIN R U-500) 500 unit/mL soln by SubCUTAneous route. 70 units before breastfast and lunch, 30 units before bed   Yes Provider, Historical   cholecalciferol, vitamin D3, (VITAMIN D3) 2,000 unit tab Take  by mouth. Yes Provider, Historical   aspirin 81 mg Tab Take  by mouth. Yes Provider, Historical     Allergies   Allergen Reactions    Tenormin [Atenolol] Other (comments)     depression    Vicodin [Hydrocodone-Acetaminophen] Nausea and Vomiting      Family History   Problem Relation Age of Onset    Cancer Mother         Ovarian    Lung Disease Mother         COPD    Diabetes Father     Cancer Father         Chordoma    Heart Disease Father 67        CAD, stint placed    Heart Disease Maternal Grandfather         Black lung    Heart Disease Maternal Aunt         Breast cancer    Migraines Son         ATYPICAL, \"ACTS LIKE NERVE DISEASE\"    Anesth Problems Neg Hx         SOCIAL HISTORY:  Patient resides at Home. Objective:       Physical Exam:   Visit Vitals  /74   Pulse (!) 104   Temp 98.1 °F (36.7 °C)   Resp 16   LMP 06/01/1998   SpO2 97%     General:  Alert, cooperative, no distress, appears stated age. Head:  Normocephalic, without obvious abnormality, atraumatic. Eyes:  Conjunctivae/corneas clear. EOMs intact. Ears:  Normal TMs and external ear canals both ears. Nose: Nares normal. Septum midline. Mucosa normal. No drainage or sinus tenderness. Throat: Lips, mucosa, and tongue normal. Teeth and gums normal.   Neck: Supple, symmetrical, trachea midline, no adenopathy, thyroid: no enlargement/tenderness/nodules, no carotid bruit and no JVD. Back:   Symmetric, no curvature. ROM normal. No CVA tenderness. Lungs:   Clear to auscultation bilaterally. Chest wall:  No tenderness or deformity. Heart:  Regular rate and rhythm, S1, S2 normal, no murmur, click, rub or gallop. Abdomen:   Soft, non-tender.  Bowel sounds normal. No masses, No organomegaly. Extremities: Extremities normal, atraumatic, no cyanosis or edema. Pulses: 2+ radial pulses   Skin: Skin color, texture, turgor normal. No rashes or lesions. Lymph nodes: Cervical, supraclavicular, and axillary nodes normal.   Neurologic: CNII-XII intact. Normal strength, sensation and reflexes throughout. Data Review: All diagnostic labs and studies have been reviewed. Chest x-ray: no acute cardiopulmonary process    Assessment: Lita Skiff is a 59 y.o. female who presents with incisional hernia, and diastasis recti s/p surgical repair. We are asked to see the patient in consult to assist in managing DM II. Active Problems:    Ventral hernia (5/18/2022)      History of incisional hernia repair (5/18/2022)        Plan:     #Incisional Hernia  #Diastasis Recti  -POD #1  -mgmt per primary team    #HTN  -continue norvasc    #DM II  -hold home metformin  -currently on humulin 40 units with breakfast, and lunch, and 20units with dinner, received first dose the afternoon of 5/19 so will maintain current regimen for now. Patient states the her blood sugars were not controlled on her home regimen, so this may need to be increased as she begins to eat normally postoperatively.   -decreased carbs in diet to 45g/meal  -continue to monitor glucose TIDAC, and HS    #anxiety/depression  -on prozac    #seasonal allergies  -continue zyrtec      Signed By: Burton Solano MD     Date of Service:  5/20/2022

## 2022-05-20 NOTE — PROGRESS NOTES
Pt seen and examined. Consulted last night for hyperglycemia from type 2 diabetes. - Pt stable for DC from IM perspective, hyperglycemic but no sign of DKA. - Pt has continuous glucose monitor at home, and sliding scale. Would continue to work on [employer] at home. Pt in close contact with her endocrinologist.     Gary Fagan per Gen surgery.

## 2022-05-20 NOTE — PROGRESS NOTES
Surgery Progress Note    5/20/2022    Admit Date: 5/18/2022    CC: Abd pain    POD: 2 Robotic ETEP IHR    Subjective:     Voided. Pain controlled. Eating some. Constitutional: No fever or chills  Neurologic: No headache  Eyes: No scleral icterus or irritated eyes  Nose: No nasal pain or drainage  Mouth: No oral lesions or sore throat  Cardiac: No palpations or chest pain  Pulmonary: No cough or shortness of breath  Gastrointestinal: Abd pain, No nausea, emesis, diarrhea, or constipation  Genitourinary: No dysuria  Musculoskeletal: No muscle or joint tenderness  Skin: No rashes or lesions  Psychiatric: No anxiety or depressed mood    Objective:     Visit Vitals  /74   Pulse (!) 104   Temp 98.1 °F (36.7 °C)   Resp 16   SpO2 97%       General: No acute distress, conversant  Eyes: PERRLA, no scleral icterus  HENT: Normocephalic without oral lesions  Neck: Trachea midline without LAD  Cardiac: Normal pulse rate and rhythm  Pulmonary: Symmetric chest rise with normal effort  GI: Soft, ATTP, binder in place, drain serosang  Skin: Warm without rash  Extremities: No edema or joint stiffness  Psych: Appropriate mood and affect    Labs, vital signs, and I/O reviewed. Assessment:     60 y/o F with s/p Robotic ETEP IHR doing fair    Plan:     PRN pain control. NSAIDS. Dilaudid. Flexeril  Voiding. Cont IVF  Wean oxygen  Hyperglycemia. Improving. Appreciate DM education team and medicine.  Will resume home regimen on DC  Hyperkalemia-Resolved  Reg diet  Lovenox  DC drain on DC  Walker for home  Possibly home later today if can get off Janet Hugo MD  Bariatric and General Surgeon  [facility]

## 2022-05-20 NOTE — PROGRESS NOTES
Bedside shift change report given to [facility], RN (oncoming nurse) by Cordelia Michaels RN (offgoing nurse). Report included the following information SBAR, Kardex, Procedure Summary, Intake/Output and Recent Results.

## 2022-06-03 ENCOUNTER — CLINICAL SUPPORT (OUTPATIENT)
Dept: SURGERY | Age: 62
End: 2022-06-03

## 2022-06-03 DIAGNOSIS — E66.01 MORBID OBESITY (HCC): Primary | ICD-10-CM

## 2022-06-06 ENCOUNTER — OFFICE VISIT (OUTPATIENT)
Dept: SURGERY | Age: 62
End: 2022-06-06
Payer: MEDICAID

## 2022-06-06 VITALS
WEIGHT: 222 LBS | OXYGEN SATURATION: 97 % | RESPIRATION RATE: 19 BRPM | HEART RATE: 100 BPM | HEIGHT: 62 IN | SYSTOLIC BLOOD PRESSURE: 129 MMHG | TEMPERATURE: 97.8 F | DIASTOLIC BLOOD PRESSURE: 81 MMHG | BODY MASS INDEX: 40.85 KG/M2

## 2022-06-06 DIAGNOSIS — Z09 POSTOPERATIVE EXAMINATION: Primary | ICD-10-CM

## 2022-06-06 PROCEDURE — 99024 POSTOP FOLLOW-UP VISIT: CPT | Performed by: SURGERY

## 2022-06-06 NOTE — PROGRESS NOTES
Surgery Progress Note    6/6/2022    CC: Postoperative state    Subjective:     Patient doing well after ETEP. Pain controlled. No signs of hernia recurrence. Constitutional: No fever or chills  Neurologic: No headache  Eyes: No scleral icterus or irritated eyes  Nose: No nasal pain or drainage  Mouth: No oral lesions or sore throat  Cardiac: No palpations or chest pain  Pulmonary: No cough or shortness of breath  Gastrointestinal: No nausea, emesis, diarrhea, or constipation  Genitourinary: No dysuria  Musculoskeletal: No muscle or joint tenderness  Skin: No rashes or lesions  Psychiatric: No anxiety or depressed mood    Objective:     Visit Vitals  /81 (BP 1 Location: Left upper arm, BP Patient Position: Sitting, BP Cuff Size: Adult long)   Pulse 100   Temp 97.8 °F (36.6 °C) (Oral)   Resp 19   Ht 5' 1.5\" (1.562 m)   Wt 222 lb (100.7 kg)   SpO2 97%   BMI 41.27 kg/m²       General: No acute distress, conversant  Eyes: PERRLA, no scleral icterus  HENT: Normocephalic without oral lesions  Neck: Trachea midline without LAD  Cardiac: Normal pulse rate and rhythm  Pulmonary: Symmetric chest rise with normal effort  GI: Soft, wounds clean dry and intact, dimpling at skin from fundoplication, no hernia  Skin: Warm without rash  Extremities: No edema or joint stiffness  Psych: Appropriate mood and affect    Assessment:     47-year-old female doing well after ETEP ventral hernia repair    Plan:     Okay for full activity. I will see her back in December or January for her bypass consents. Doing well.       Jeremy Ni MD  Bariatric and General Surgeon  [facility]

## 2022-06-06 NOTE — PROGRESS NOTES
1. Have you been to the ER, urgent care clinic since your last visit? Hospitalized since your last visit? No    2. Have you seen or consulted any other health care providers outside of the [address] [place] [address] since your last visit? Include any pap smears or colon screening.  No

## 2022-06-07 LAB
H PYLORI IGA SER-ACNC: <9 UNITS (ref 0–8.9)
H PYLORI IGG SER IA-ACNC: 0.16 INDEX VALUE (ref 0–0.79)

## 2022-06-20 ENCOUNTER — HOSPITAL ENCOUNTER (OUTPATIENT)
Dept: GENERAL RADIOLOGY | Age: 62
Discharge: HOME | End: 2022-06-20
Attending: SURGERY
Payer: MEDICAID

## 2022-06-20 DIAGNOSIS — I10 ESSENTIAL HYPERTENSION: ICD-10-CM

## 2022-06-20 DIAGNOSIS — E66.01 MORBID OBESITY (HCC): ICD-10-CM

## 2022-06-20 PROCEDURE — 74240 X-RAY XM UPR GI TRC 1CNTRST: CPT

## 2022-06-28 RX ORDER — LOSARTAN POTASSIUM AND HYDROCHLOROTHIAZIDE 25; 100 MG/1; MG/1
TABLET ORAL
Qty: 90 TABLET | Refills: 0 | Status: SHIPPED | OUTPATIENT
Start: 2022-06-28 | End: 2022-09-07

## 2022-07-02 DIAGNOSIS — I10 ESSENTIAL HYPERTENSION: ICD-10-CM

## 2022-07-05 ENCOUNTER — OFFICE VISIT (OUTPATIENT)
Dept: SURGERY | Age: 62
End: 2022-07-05

## 2022-07-05 DIAGNOSIS — E66.01 MORBID OBESITY (HCC): Primary | ICD-10-CM

## 2022-07-05 NOTE — PROGRESS NOTES
[facility] at [facility]  Supervised Weight Loss     Date:   2022    Patient's Name: Kathia Laughlin  : 1960    Insurance: [place]          Session:   Surgery: Gastric bypass  Surgeon:  Dr. Omari Brenner    Height: 61.5 inches Weight: 217     Lbs. BMI: 40   Pounds Lost since last month: 6 lbs               Pounds Gained since last month: 0    Starting Weight: 223 lbs  Previous Months Weight: 223 lbs  Overall Pounds Lost: 6 lbs Overall Pounds Gained: 0    Other Pertinent Information: Today's appointment was completed in a virtual setting d/t COVID-19. Surgeon recommending 11 lb wt loss before surgery    Smoking Status:  None  Alcohol Intake: 1 drink, 2x year    I have reviewed with pt the guidelines of the supervised wt loss program.  Pt understands the expectations of some wt loss during the program and that wt gain could delay the process. I have also explained that appointments need to be consecutive and missing an appointment may result in starting over. Pt has received this information in writing. Changes that patient has made since last month include:  Daily vitamin, Vit D and calcium; exercise 3x week. Eating Habits and Behaviors  A nutrition lesson specific to vitamins was provided. We discussed the various reasons for needing vitamins and different types and doses. General healthy eating guidelines were also discussed. Pts were instructed that their plate should be made up 1/2 plate coming from non-starchy vegetables, 1/4 coming from lean meat, and 1/4 of their plate coming from carbohydrates, including fruits, starches, or milk. We discussed measuring meals to 1/2 cup total per meal after surgery. Drinking only calorie-free, sugar-free and non-carbonated beverages. We discussed the importance of drinking 64 ounces of fluid per day to prevent dehydration post-operatively.                        Patient's current diet habits include: Pt is eating 2-3 meals per day. Snack choices include pbutter crackers, cereal. Pt is eating refined carbohydrate foods (bread, pasta, rice, potatoes) occasionally. Pt is not eating sweets/desserts. Pt is using frying cooking methods. Pt is eating meals prepared outside of the home 1-3x week. Pt is drinking water, caffeinated beverages. Pt reports sometimes emotionally eating. Physical Activity/Exercise  We talked about the importance of increasing daily physical activity and beginning to develop an exercise regimen/routine. We talked about exercise as being an important part of long term weight loss after surgery. Comments:  During class, I discussed with patient the importance of getting into an exercise routine. Pt is currently elliptical 4x week for 30 min for activity. Pt has been encouraged to continue with current exercise- increase duration as able. Behavior Modification       We talked about how to eat more mindfully and identify emotional eating triggers. Tips and recommendations for how to make these changes were provided. Pt was encouraged to keep a food journal and record what they were taking in daily. Overall Assessment:Nutrition evaluation reveals important lifestyle changes are being made. Goals set and recommendations made. Will continue to assess. Patient-Set Goals:   1. Nutrition - limit pizza to no more than 2x week  2. Exercise - increase exercise to 4x week for 45 min; track exercise  3.  Behavior -tighten control of blood sugars; check blood sugars 4x day    Gerhardt Jaffe, DAVID  7/5/2022

## 2022-07-06 RX ORDER — AMLODIPINE BESYLATE 5 MG/1
TABLET ORAL
Qty: 90 TABLET | Refills: 1 | Status: SHIPPED | OUTPATIENT
Start: 2022-07-06

## 2022-07-29 ENCOUNTER — OFFICE VISIT (OUTPATIENT)
Dept: INTERNAL MEDICINE CLINIC | Age: 62
End: 2022-07-29
Payer: MEDICAID

## 2022-07-29 VITALS
TEMPERATURE: 97.5 F | HEART RATE: 100 BPM | WEIGHT: 218.2 LBS | OXYGEN SATURATION: 97 % | SYSTOLIC BLOOD PRESSURE: 126 MMHG | HEIGHT: 62 IN | RESPIRATION RATE: 16 BRPM | DIASTOLIC BLOOD PRESSURE: 78 MMHG | BODY MASS INDEX: 40.15 KG/M2

## 2022-07-29 DIAGNOSIS — Z79.4 TYPE 2 DIABETES MELLITUS WITH DIABETIC NEUROPATHY, WITH LONG-TERM CURRENT USE OF INSULIN (HCC): ICD-10-CM

## 2022-07-29 DIAGNOSIS — E78.5 HYPERLIPIDEMIA LDL GOAL <100: ICD-10-CM

## 2022-07-29 DIAGNOSIS — E66.01 MORBID OBESITY (HCC): ICD-10-CM

## 2022-07-29 DIAGNOSIS — I10 ESSENTIAL HYPERTENSION: Primary | ICD-10-CM

## 2022-07-29 DIAGNOSIS — E11.40 TYPE 2 DIABETES MELLITUS WITH DIABETIC NEUROPATHY, WITH LONG-TERM CURRENT USE OF INSULIN (HCC): ICD-10-CM

## 2022-07-29 DIAGNOSIS — F32.5 MAJOR DEPRESSIVE DISORDER WITH SINGLE EPISODE, IN FULL REMISSION (HCC): ICD-10-CM

## 2022-07-29 PROBLEM — M48.02 CERVICAL STENOSIS OF SPINAL CANAL: Status: RESOLVED | Noted: 2018-10-18 | Resolved: 2022-07-29

## 2022-07-29 PROBLEM — K43.9 VENTRAL HERNIA: Status: RESOLVED | Noted: 2022-05-18 | Resolved: 2022-07-29

## 2022-07-29 PROCEDURE — 99214 OFFICE O/P EST MOD 30 MIN: CPT | Performed by: INTERNAL MEDICINE

## 2022-07-29 RX ORDER — TRIAMCINOLONE ACETONIDE 5 MG/G
CREAM TOPICAL 2 TIMES DAILY
Qty: 15 G | Refills: 1 | Status: SHIPPED | OUTPATIENT
Start: 2022-07-29

## 2022-07-29 NOTE — ASSESSMENT & PLAN NOTE
She has been seen by Dr. Jessica Cross in bariatric surgery and gastric bypass is recommended. She is a good candidate for this and has significant morbidity related to her obesity which could be improved with significant weight loss. .  She has failed to maintain weight loss with multiple dietary programs.

## 2022-07-29 NOTE — PROGRESS NOTES
Brianna Vargas is a 59 y.o. female who was seen today for a follow-up visit. Assessment & Plan:   1. Essential hypertension  Assessment & Plan:   well controlled, continue current medications  2. Hyperlipidemia LDL goal <325  -     METABOLIC PANEL, COMPREHENSIVE; Future  -     LIPID PANEL; Future  3. Type 2 diabetes mellitus with diabetic neuropathy, with long-term current use of insulin ([facility] [address]  Assessment & Plan:   Reviewed last A1c. She continues to follow with endocrine and is working on weight loss and diabetic diet. Continue with current medications. Orders:  -     METABOLIC PANEL, COMPREHENSIVE; Future  -     MICROALBUMIN, UR, RAND W/ MICROALB/CREAT RATIO; Future  4. Major depressive disorder with single episode, in full remission [facility])  Assessment & Plan:   well controlled, continue current medications  5. Morbid obesity ([facility] [address]  Assessment & Plan:   She has been seen by Dr. Jessica Villagran in bariatric surgery and gastric bypass is recommended. She is a good candidate for this and has significant morbidity related to her obesity which could be improved with significant weight loss. .  She has failed to maintain weight loss with multiple dietary programs. .   follow up 6 months. Subjective: Brianna Vargas was seen for hypertension, hyperlipidemia, type 2 diabetes with nephropathy and neuropathy and depression. She reports she has been seen by Dr. Jessica Villagran of general surgery and has discussed with him doing a gastric bypass procedure. She brings a form today for completion with her weights and recommendations. This is completed and will be scanned to chart. She has tried dietary changes exercise and wt watchers nutrasystem, noom most recently. She has been able to lose wt and has been able in the past to lose 40 pounds but regains and not able to get lower. Review of Systems   Constitutional:  Negative for fever. HENT:  Negative for congestion and sore throat.     Respiratory:  Negative for shortness of breath. Cardiovascular:  Negative for chest pain and leg swelling. Gastrointestinal:  Positive for constipation (manages with fiber and water). Negative for abdominal pain, blood in stool, diarrhea, heartburn, nausea and vomiting. Genitourinary:  Negative for dysuria and urgency. Neurological:  Negative for dizziness and headaches. Physical Exam  Vitals and nursing note reviewed. Constitutional:       General: She is not in acute distress. Appearance: She is well-developed. HENT:      Head: Normocephalic and atraumatic. Cardiovascular:      Rate and Rhythm: Normal rate and regular rhythm. Pulmonary:      Effort: Pulmonary effort is normal.      Breath sounds: Normal breath sounds. No wheezing. Abdominal:      General: Bowel sounds are normal. There is no distension. Palpations: Abdomen is soft. There is no mass. Tenderness: There is no abdominal tenderness. Musculoskeletal:      Right lower leg: No edema. Left lower leg: No edema. Psychiatric:         Mood and Affect: Mood normal.     Visit Vitals  /78 (BP 1 Location: Right arm, BP Patient Position: Sitting, BP Cuff Size: Large adult)   Pulse 100   Temp 97.5 °F (36.4 °C) (Temporal)   Resp 16   Ht 5' 1.5\" (1.562 m)   Wt 218 lb 3.2 oz (99 kg)   LMP 06/01/1998   SpO2 97%   BMI 40.56 kg/m²        Aspects of this note may have been generated using voice recognition software. Despite editing, there may be some syntax errors   I have discussed the diagnosis with the patient and the intended plan as seen in the above orders. The patient has received an after-visit summary and questions were answered concerning future plans. I have discussed any recommended medication side effects and warnings with the patient as well.   She has expressed understanding of the diagnosis and plan    Sia De Los Santos MD

## 2022-07-29 NOTE — ASSESSMENT & PLAN NOTE
Reviewed last A1c. She continues to follow with endocrine and is working on weight loss and diabetic diet. Continue with current medications.

## 2022-08-02 ENCOUNTER — OFFICE VISIT (OUTPATIENT)
Dept: SURGERY | Age: 62
End: 2022-08-02

## 2022-08-02 DIAGNOSIS — E66.01 MORBID OBESITY (HCC): Primary | ICD-10-CM

## 2022-08-02 NOTE — PROGRESS NOTES
[facility] at [facility]  Supervised Weight Loss     Date:   2022    Patient's Name: Mario Hitchcock  : 1960       Insurance: [place]          Session: 3   Surgery: Gastric bypass                  Surgeon:  Dr. De La Rosa Favorite     Height: 61.5 inches    Weight: 215     Lbs. BMI: 40             Pounds Lost since last month: 2 lbs               Pounds Gained since last month: 0     Starting Weight: 223 lbs                   Previous Months Weight: 217 lbs  Overall Pounds Lost: 8 lbs   Overall Pounds Gained: 0     Other Pertinent Information: Today's appointment was completed in a virtual setting d/t COVID-19. Surgeon recommending 11 lb wt loss before surgery        Smoking Status:  None  Alcohol Intake: 2 drinks per year    I have reviewed with pt the guidelines of the supervised wt loss program.  Pt understands the expectations of some wt loss during the program and that wt gain could delay the process. I have also explained that appointments need to be consecutive and missing an appointment may result in starting over. Pt has received this information in writing. Changes that patient has made since last month include:  exercising more, more water. Eating Habits and Behaviors  General healthy eating guidelines were also discussed. Pts were instructed that their plate should be made up 1/2 plate coming from non-starchy vegetables, 1/4 coming from lean meat, and 1/4 of their plate coming from carbohydrates, including fruits, starches, or milk. We discussed measuring meals to 1/2 cup total per meal after surgery. Drinking only calorie-free, sugar-free and non-carbonated beverages. We discussed the importance of drinking 64 ounces of fluid per day to prevent dehydration post-operatively.                  Physical Activity/Exercise  We talked about the importance of increasing daily physical activity and beginning to develop an exercise regimen/routine. We talked about exercise as being an important part of long term weight loss after surgery. Comments:  During class, I discussed with patient the importance of getting into an exercise routine. Pt is currently using elliptical 3-4x week for 30-45 min for activity. Pt has been encouraged to continue with current exercise. Behavior Modification    A behavior modification lesson was provided with an emphasis on developing mindful eating behaviors. We talked about how to eat more mindfully and identify emotional eating triggers. Tips and recommendations for how to make these changes were provided. Pt was encouraged to keep a food journal and record what they were taking in daily. Patient's reported eating behaviors: sometimes emotionally eating; not packing meals when away from home; eating meals in front of ipad. Biggest trigger when managing weight is portion sizes and late night eating. Overall Assessment: Nutrition evaluation reveals important lifestyle changes are being made. Goals set and recommendations made. Will continue to assess. Patient-Set Goals:   1. Nutrition - work in more veggies, fruit and protein; reduce amount of pizza and fast food  2. Exercise - continue with current exercise; increase level on elliptical  3.  Behavior -test blood sugar 4-5x daily    Pamela West, DAVID  8/2/2022

## 2022-08-19 LAB
ALBUMIN SERPL-MCNC: 4.2 G/DL (ref 3.8–4.8)
ALBUMIN/CREAT UR: 21 MG/G CREAT (ref 0–29)
ALBUMIN/GLOB SERPL: 1.8 {RATIO} (ref 1.2–2.2)
ALP SERPL-CCNC: 116 IU/L (ref 44–121)
ALT SERPL-CCNC: 21 IU/L (ref 0–32)
AST SERPL-CCNC: 18 IU/L (ref 0–40)
BILIRUB SERPL-MCNC: <0.2 MG/DL (ref 0–1.2)
BUN SERPL-MCNC: 15 MG/DL (ref 8–27)
BUN/CREAT SERPL: 20 (ref 12–28)
CALCIUM SERPL-MCNC: 9.9 MG/DL (ref 8.7–10.3)
CHLORIDE SERPL-SCNC: 98 MMOL/L (ref 96–106)
CHOLEST SERPL-MCNC: 131 MG/DL (ref 100–199)
CO2 SERPL-SCNC: 24 MMOL/L (ref 20–29)
CREAT SERPL-MCNC: 0.75 MG/DL (ref 0.57–1)
CREAT UR-MCNC: 99.4 MG/DL
EGFR: 89 ML/MIN/1.73
GLOBULIN SER CALC-MCNC: 2.3 G/DL (ref 1.5–4.5)
GLUCOSE SERPL-MCNC: 241 MG/DL (ref 65–99)
HDLC SERPL-MCNC: 50 MG/DL
LDLC SERPL CALC-MCNC: 60 MG/DL (ref 0–99)
MICROALBUMIN UR-MCNC: 20.5 UG/ML
POTASSIUM SERPL-SCNC: 4.8 MMOL/L (ref 3.5–5.2)
PROT SERPL-MCNC: 6.5 G/DL (ref 6–8.5)
SODIUM SERPL-SCNC: 136 MMOL/L (ref 134–144)
TRIGL SERPL-MCNC: 114 MG/DL (ref 0–149)
VLDLC SERPL CALC-MCNC: 21 MG/DL (ref 5–40)

## 2022-09-01 ENCOUNTER — OFFICE VISIT (OUTPATIENT)
Dept: SURGERY | Age: 62
End: 2022-09-01

## 2022-09-01 DIAGNOSIS — E66.01 MORBID OBESITY (HCC): Primary | ICD-10-CM

## 2022-09-01 NOTE — PROGRESS NOTES
[facility] at [facility]  Supervised Weight Loss     Date:   2022    Patient's Name: Randy Schmidt  : 1960    Insurance: [place]          Session:   6  Surgery: Gastric bypass                  Surgeon:  Dr. Olinda Neumann     Height: 61.5 inches    Weight: 216.6    Lbs. BMI: 40             Pounds Lost since last month: 0              Pounds Gained since last month: 1.6 lbs     Starting Weight: 223 lbs                   Previous Months Weight: 215 lbs  Overall Pounds Lost: 6.4 lbs   Overall Pounds Gained: 0     Other Pertinent Information: Today's appointment was completed in a virtual setting d/t COVID-19. Surgeon recommending 11 lb wt loss before surgery       Smoking Status:  None  Alcohol Intake: 2 drinks per year    I have reviewed with pt the guidelines of the supervised wt loss program.  Pt understands the expectations of some wt loss during the program and that wt gain could delay the process. I have also explained that appointments need to be consecutive and missing an appointment may result in starting over. Pt has received this information in writing. Changes that patient has made since last month include:  none reported. Eating Habits and Behaviors  General healthy eating guidelines were discussed. A nutrition lesson was presented on portion control. Patients were instructed implement portion control now using the balanced plate method (1/2 plate non-starchy vegetables, 1/4 plate lean meat, and 1/4 plate whole grains and to include fruit and/or milk at meals or snack). We discussed measuring meals to 1/2 cup total per meal after surgery and appropriate portion progression long term. Patient's current diet habits include: Pt is eating 1-2 meals per day. Snack choices include yogurt, cereal and milk. Pt is eating refined carbohydrate foods (bread, pasta, rice, potatoes) 2x week.  Pt is not eating sweets/desserts. Pt is using baking, grilling, broiling and frying cooking methods. Pt is eating meals prepared outside of the home rarely. Pt is drinking water, caffeinated beverages. Pt reports sometimes emotionally eating. Physical Activity/Exercise  We talked about the importance of increasing daily physical activity and beginning to develop an exercise regimen/routine. We talked about exercise as being an important part of long term weight loss after surgery. Comments:  During class, I discussed with patient the importance of getting into an exercise routine. Pt is currently using elliptical 4x week for 30-45 min for activity. Pt has been encouraged to continue with current exercise. Behavior Modification     We talked about how to eat more mindfully. Tips and recommendations for how to make these changes were provided. Pt was encouraged to keep a food journal and record what they were taking in daily. Overall Assessment: Nutrition evaluation reveals more lifestyle changes are needed. Goals set and recommendations made. Will continue to assess    Patient-Set Goals:   1. Nutrition - follow portion sizes; focus on protein-have with each meal  2. Exercise - elliptical 4x week for 45 min; add walking- 3000 steps per day  3.  Behavior -sip, not gulp water    Lloyd Page, DAVID  9/1/2022

## 2022-09-07 DIAGNOSIS — E78.5 HYPERLIPIDEMIA LDL GOAL <100: ICD-10-CM

## 2022-09-07 DIAGNOSIS — I10 ESSENTIAL HYPERTENSION: ICD-10-CM

## 2022-09-07 RX ORDER — LOSARTAN POTASSIUM AND HYDROCHLOROTHIAZIDE 25; 100 MG/1; MG/1
TABLET ORAL
Qty: 90 TABLET | Refills: 0 | Status: SHIPPED | OUTPATIENT
Start: 2022-09-07

## 2022-09-08 RX ORDER — ROSUVASTATIN CALCIUM 20 MG/1
TABLET, COATED ORAL
Qty: 90 TABLET | Refills: 1 | Status: SHIPPED | OUTPATIENT
Start: 2022-09-08

## 2022-10-04 ENCOUNTER — OFFICE VISIT (OUTPATIENT)
Dept: SURGERY | Age: 62
End: 2022-10-04

## 2022-10-04 DIAGNOSIS — E66.01 MORBID OBESITY (HCC): Primary | ICD-10-CM

## 2022-10-04 NOTE — PROGRESS NOTES
[facility] at [facility]  Supervised Weight Loss     Date:   10/4/2022    Patient's Name: Acacia Aden  : 1960    Insurance: [place]          Session: 5 of    Surgery: Gastric bypass                  Surgeon:  Dr. Eb Muller     Height: 61.5 inches    Weight: 218.2  Lbs. BMI: 40             Pounds Lost since last month: 0              Pounds Gained since last month: 1.6 lbs     Starting Weight: 223 lbs                   Previous Months Weight: 216.6 lbs  Overall Pounds Lost: 4.8 lbs   Overall Pounds Gained: 0     Other Pertinent Information: Today's appointment was completed in a virtual setting d/t COVID-19. Surgeon recommending 11 lb wt loss before surgery        Smoking Status:  None  Alcohol Intake: 2 drinks per year    I have reviewed with pt the guidelines of the supervised wt loss program.  Pt understands the expectations of some wt loss during the program and that wt gain could delay the process. I have also explained that appointments need to be consecutive and missing an appointment may result in starting over. Pt has received this information in writing. Changes that patient has made since last month include: tried different protein shakes      Eating Habits and Behaviors  General healthy eating guidelines were also discussed. Pts were instructed that their plate should be made up 1/2 plate coming from non-starchy vegetables, 1/4 coming from lean meat, and 1/4 of their plate coming from carbohydrates, including fruits, starches, or milk. We discussed measuring meals to 1/2 cup total per meal after surgery. Drinking only calorie-free, sugar-free and non-carbonated beverages. We discussed the importance of drinking 64 ounces of fluid per day to prevent dehydration post-operatively. Patient's current diet habits include: Pt is eating 2-3 meals per day. Snack choices include chips/salsa, cereal/milk.  Pt is eating refined carbohydrate foods (bread, pasta, rice, potatoes) occasionally. Pt is eating sweets/desserts 1x week. Pt is using healthy cooking methods. Pt is eating meals prepared outside of the home rarely. Pt is drinking water, coffee. Pt reports sometimes emotionally eating. Physical Activity/Exercise  An exercise presentation was provided including information about exercise programs available both before and after surgery. We talked about the importance of increasing daily physical activity and beginning to develop an exercise regimen/routine. We talked about exercise as being an important part of long term weight loss after surgery. Comments:  During class, I discussed with patient the importance of getting into an exercise routine. Pt is currently not exercising due to recent illness with COVID. Pt has been encouraged to restart exercise when able. Behavior Modification       We talked about how to eat more mindfully. Tips and recommendations for how to make these changes were provided. Pt was encouraged to keep a food journal and record what they were taking in daily. Overall Assessment: Nutrition evaluation reveals small lifestyle changes are being made. Goals set and recommendations made. Will continue to assess    Patient-Set Goals:   1. Nutrition - eat less snacks- no more buying ice cream or Doritos  2. Exercise - start back face-timing exercise with cousin; 45 min on elliptical 4x week  3.  Behavior -focus on portion size for protein; check blood sugar 4-5x day    Mo Caraballo RD  10/4/2022 Patent

## 2022-11-01 ENCOUNTER — OFFICE VISIT (OUTPATIENT)
Dept: SURGERY | Age: 62
End: 2022-11-01

## 2022-11-01 DIAGNOSIS — E66.01 MORBID OBESITY (HCC): Primary | ICD-10-CM

## 2022-11-01 NOTE — PROGRESS NOTES
[facility] at [address]  Supervised Weight Loss     Date:   2022    Patient's Name: Alec Winston  : 1960    Insurance: [place]          Session:   6  Surgery: Gastric bypass                  Surgeon:  Dr. Cuba Fisher     Height: 61.5 inches    Weight: 218.2  Lbs. BMI: 40             Pounds Lost since last month: 0              Pounds Gained since last month: 0 lbs     Starting Weight: 223 lbs                   Previous Months Weight: 218.2 lbs  Overall Pounds Lost: 4.8 lbs   Overall Pounds Gained: 0     Other Pertinent Information: Today's appointment was completed in a virtual setting d/t COVID-19. Surgeon recommending 11 lb wt loss before surgery        Smoking Status:  None  Alcohol Intake: 2 drinks per year    I have reviewed with pt the guidelines of the supervised wt loss program.  Pt understands the expectations of some wt loss during the program and that wt gain could delay the process. I have also explained that appointments need to be consecutive and missing an appointment may result in starting over. Pt has received this information in writing. Changes that patient has made since last month include:  tried different protein shakes; sipping 64 oz water daily. Eating Habits and Behaviors  A nutrition lesson was presented on label reading with specific guidelines provided for limiting added sugars. This information will help increase healthy food choices, promote weight loss and prevent dumping syndrome after gastric bypass. We also reviewed the general nutrition guidelines for bariatric surgery. Patient's current diet habits include: Pt is eating 2-3 meals per day. Snack choices include chips/salsa, cereal/milk. Pt is eating refined carbohydrate foods (bread, pasta, rice, potatoes) occasionally when blood sugar low or bored. Pt is eating sweets/desserts 1x week.  Pt is using healthy cooking methods. Pt is eating meals prepared outside of the home rarely. Pt is drinking water, coffee. Pt reports sometimes emotionally eating, but trying to use distraction techniques. Physical Activity/Exercise  We talked about the importance of increasing daily physical activity and beginning to develop an exercise regimen/routine. We talked about exercise as being an important part of long term weight loss after surgery. Comments:  During class, I discussed with patient the importance of getting into an exercise routine. Pt is currently using the elliptical 4x week for 45 min for activity. Pt has been encouraged to continue with current exercise. Behavior Modification       We talked about how to eat more mindfully. Tips and recommendations for how to make these changes were provided. Pt was encouraged to keep a food journal and record what they were taking in daily. Overall Assessment: Pt demonstrates appropriate lifestyle changes evidenced by reported changes and weight loss over the past 6 months. Pt demonstrates understanding of nutrition guidelines for bariatric surgery. Appears to be an appropriate candidate at this time. Patient-Set Goals:   1. Nutrition - eat more protein; add in protein drinks; try unflavored protein powder  2. Exercise - continue current exercise on elliptical; walk more  3.  Behavior -focus on protein and portion sizes- purchase smaller plate    Abbie Jackson, DAVID  11/1/2022

## 2022-11-07 ENCOUNTER — DOCUMENTATION ONLY (OUTPATIENT)
Dept: SURGERY | Age: 62
End: 2022-11-07

## 2022-11-07 ENCOUNTER — OFFICE VISIT (OUTPATIENT)
Dept: SURGERY | Age: 62
End: 2022-11-07
Payer: MEDICAID

## 2022-11-07 VITALS
BODY MASS INDEX: 38.62 KG/M2 | HEART RATE: 98 BPM | OXYGEN SATURATION: 96 % | HEIGHT: 63 IN | DIASTOLIC BLOOD PRESSURE: 64 MMHG | TEMPERATURE: 98.6 F | WEIGHT: 218 LBS | RESPIRATION RATE: 16 BRPM | SYSTOLIC BLOOD PRESSURE: 124 MMHG

## 2022-11-07 DIAGNOSIS — E66.01 MORBID OBESITY (HCC): Primary | ICD-10-CM

## 2022-11-07 PROCEDURE — 3074F SYST BP LT 130 MM HG: CPT | Performed by: SURGERY

## 2022-11-07 PROCEDURE — 99214 OFFICE O/P EST MOD 30 MIN: CPT | Performed by: SURGERY

## 2022-11-07 PROCEDURE — 3078F DIAST BP <80 MM HG: CPT | Performed by: SURGERY

## 2022-11-07 NOTE — PROGRESS NOTES
Bariatric Surgery Progress Note    CC: Obesity   Subjective:     Pt has done well after her ETEP ventral hernia repair. She has lost weight. Cleared by dietary and psych. Looking forward to surgery. Looking forward to cutting down her insulin significantly    Total weight loss to date: 6lbs    Tobacco use: no    EGD / UGI reviewed / issues: UGI mild reflux    Sleep apnea addressed: has sleep apnea uses machine    MBSAQIP risk calculator discussed:  We discussed the 1 to 2% risk of bleeding infection and leak    Previous relevant surgeries: Robotic ETEP ventral hernia repair with mesh, cholecystectomy, ,    Patient Active Problem List    Diagnosis Date Noted    History of incisional hernia repair 2022    H/O bilateral mastectomy 2018    Type 2 diabetes with nephropathy ([address] 2018    Type 2 diabetes mellitus with diabetic neuropathy ([address] 2018    Hyperlipidemia LDL goal <100 2016    Essential hypertension 10/02/2015    Depression 10/02/2015    Osteopenia 2015    History of carcinoma in situ of breast 04/15/2013    Incisional hernia 2012    Morbid obesity ([address] 2011    Obstructive sleep apnea syndrome 2009      Past Medical History:   Diagnosis Date    breast 2008    breast; right DCIS    Depression     Incisional hernia 2012    TO RIGHT AND BELOW NAVAL    Microalbuminuria     Migraine     Osteopenia 2015    Other and unspecified hyperlipidemia 2009    Sleep apnea     c-pap; nightly    Type 2 diabetes mellitus with diabetic neuropathy ([address] 2018    Type 2 diabetes with nephropathy ([address] 2018    Unspecified essential hypertension 2009     Past Surgical History:   Procedure Laterality Date    HX BREAST BIOPSY Bilateral 2008    HX BREAST RECONSTRUCTION  -    HX CATARACT REMOVAL Bilateral     HX  SECTION      X 1    HX CHOLECYSTECTOMY      HX GI      COLONOSCOPY    HX HEENT      cleft palate and lip repair HX HERNIA REPAIR  2022    Robotic extended extraperioneal incisional hernia repair w/mesh Dr. Albaro Harvey     HX MASTECTOMY Bilateral 2008    HX [facility]    right knee     ARTHROSCOPIC    HX ORTHOPAEDIC  14    right RTC repair shoulder    HX ORTHOPAEDIC      NECK FUSION    HX ORTHOPAEDIC      L THUMB    HX WALESKA AND BSO      ND RECONST CLEFT PALATE,SOFT/HARD      ND SINUS SURGERY PROC UNLISTED  X2      Social History     Tobacco Use    Smoking status: Former     Packs/day: 2.00     Years: 20.00     Pack years: 40.00     Types: Cigarettes     Quit date: 1992     Years since quittin.4    Smokeless tobacco: Never   Substance Use Topics    Alcohol use: Yes     Alcohol/week: 0.0 standard drinks     Comment: About 2 per yesr      Family History   Problem Relation Age of Onset    Cancer Mother         Ovarian    Lung Disease Mother         COPD    Diabetes Father     Cancer Father         Chordoma    Heart Disease Father 67        CAD, stint placed    Heart Disease Maternal Grandfather         Black lung    Heart Disease Maternal Aunt         Breast cancer    Migraines Son         ATYPICAL, \"ACTS LIKE NERVE DISEASE\"    Anesth Problems Neg Hx       Prior to Admission medications    Medication Sig Start Date End Date Taking? Authorizing Provider   rosuvastatin (CRESTOR) 20 mg tablet TAKE 1 TABLET BY MOUTH EVERY DAY 22  Yes Chloe COLON NP   losartan-hydroCHLOROthiazide (HYZAAR) 100-25 mg per tablet TAKE 1 TABLET BY MOUTH EVERY DAY 22  Yes Chloe COLON NP   triamcinolone (ARISTOCORT) 0.5 % topical cream Apply  to affected area two (2) times a day. use thin layer as needed 22  Yes Nimisha Rao MD   amLODIPine (NORVASC) 5 mg tablet TAKE 1 TABLET BY MOUTH EVERY DAY 22  Yes Nimisha Rao MD   multivitamin (ONE A DAY) tablet Take 1 Tablet by mouth daily.    Yes Provider, Historical   calcium citrate 200 mg (950 mg) tablet Take 400 mg by mouth two (2) times a day. Yes Provider, Historical   FLUoxetine (PROzac) 20 mg capsule TAKE 3 CAPSULES BY MOUTH EVERY DAY  Patient taking differently: every morning. TAKE 3 CAPSULES BY MOUTH EVERY DAY 4/19/22  Yes Benjamin Velazquez MD   FreeStyle Sarath 2 Sensor kit  4/18/22  Yes Provider, Historical   BD Insulin Syringe U-500 1/2 mL 31 gauge x 15/64\" syrg USE AS DIRECTED 3 TIMES A DAY 2/7/22  Yes Provider, Historical   ketotifen (ZADITOR) 0.025 % (0.035 %) ophthalmic solution Administer 1 Drop to both eyes as needed. Yes Provider, Historical   fluticasone (FLONASE ALLERGY RELIEF) 50 mcg/actuation nasal spray 2 Sprays by Both Nostrils route daily. Patient taking differently: 1 Spray by Both Nostrils route daily. 1/9/19  Yes Benjamin Velazquez MD   metFORMIN ER (GLUCOPHAGE XR) 500 mg tablet Take 2 Tabs by mouth two (2) times a day. 12/3/18  Yes Benjamin Velazquez MD   insulin CONCENTRATED regular (HUMULIN R U-500) 500 unit/mL soln by SubCUTAneous route. 70 units before breastfast and lunch, 20 units before bed   Yes Provider, Historical   cholecalciferol, vitamin D3, 50 mcg (2,000 unit) tab Take  by mouth. Yes Provider, Historical   aspirin 81 mg Tab Take  by mouth.    Yes Provider, Historical     Allergies   Allergen Reactions    Tenormin [Atenolol] Other (comments)     depression    Vicodin [Hydrocodone-Acetaminophen] Nausea and Vomiting        Review of Systems:    Constitutional: No fever or chills  Neurologic: No headache  Eyes: No scleral icterus or irritated eyes  Nose: No nasal pain or drainage  Mouth: No oral lesions or sore throat  Cardiac: No palpations or chest pain  Pulmonary: No cough or shortness of breath  Gastrointestinal: No nausea, emesis, diarrhea, or constipation  Genitourinary: No dysuria  Musculoskeletal: No muscle or joint tenderness  Skin: No rashes or lesions  Psychiatric: No anxiety or depressed mood      Objective:     Physical Exam:  Visit Vitals  /64 (BP 1 Location: Left upper arm, BP Patient Position: Sitting, BP Cuff Size: Large adult)   Pulse 98   Temp 98.6 °F (37 °C) (Oral)   Resp 16   Ht 5' 2.5\" (1.588 m)   Wt 218 lb (98.9 kg)   SpO2 96%   BMI 39.24 kg/m²     General: No acute distress, conversant  Eyes: PERRLA, no scleral icterus  HENT: Normocephalic without oral lesions  Neck: Trachea midline without LAD  Cardiac: Normal pulse rate and rhythm  Pulmonary: Symmetric chest rise with normal effort  GI: Soft, well-healed incisions, obese, NT, ND, no hernia, no splenomegaly  Skin: Warm without rash  Extremities: No edema or joint stiffness  Psych: Appropriate mood and affect    Assessment:     Morbid obesity with comorbidities  Plan:   The patient and I had further discussion after their successful supervised weight loss, medical, dietary, and psychiatric clearance. Preoperative upper GI/EGD reviewed. The patient elects to proceed with robotic gastric bypass. We have discussed the risks of surgery for their individual risk factors. We also discussed if the patient's intra-abdominal scar tissue is too significant whether she would like to have a sleeve performed instead. The patient has elected for the sleeve if she cannot have the bypass performed secondary to intra-abdominal adhesions.     Plan to close all of her trocar sites with Prolene suture to prevent any herniation through her mesh    At this point they are cleared for surgery from my point of view and will be submitted for insurance approval.    Patient will be consented for: Robotic Aster-en-Y gastric bypass, E GJ, possible gastric sleeve    We have discussed the possible complications of bariatric surgery which include but are not limited to failed weight loss, weight regain, malnutrition, leak, bleed, stricture, gastric ulcer, gastric fistula, gastric bleed, esophageal injury, gallstones, new or worsening gastric reflux, nausea, emesis, internal hernia, abdominal wall hernia, gastric perforation, need for revision / conversion / or reversal, pregnancy complications and loss, intestinal ischemia, post operative skin complications, possible thinning of their hair, bowel obstruction, dumping syndrome, wound infection, blood clots (DVT, mesenteric thrombus, pulmonary embolism), increased addictive tendency, risk of anesthesia, MI, stroke, and death. The patient has read through and has signed the comprehensive consent process for bariatric / revisional surgery. This has been signed by me as well. They expressed complete understanding and had no further questions. The patient understands this is a life altering decision and will require compliance to the program for the remainder of their life in order to be monitored to avoid complication and ensure successful, sustained weight control. They will be placed on a lifelong low carbohydrate and low sugar diet, exercise, and vitamin regimen and will require frequent blood draws and office visits to ensure adequate nutrition and program compliance. Visits and follow up will be in compliance with the guidelines set forth by [facility]. I have specifically mentioned the need to avoid all personal and second-hand tobacco exposure, systemic steroids, and NSAIDS after any bariatric surgery to help avoid the above listed complications. The patient has expressed understanding of the above and would like to proceed with surgery.       Signed By: Merlin Arnold, MD  Bariatric and General Surgeon  [facility]    November 7, 2022

## 2022-11-07 NOTE — PROGRESS NOTES
Submitted Authorization to [employer] via [employer] for Robotic gastric bypass with EGD, possible gastric sleeve *EGD* with Dr. Mitch Patel #[id number]  [facility]#[id number]

## 2022-11-07 NOTE — PROGRESS NOTES
Identified pt with two pt identifiers (name and ). Reviewed chart in preparation for visit and have obtained necessary documentation. Albino Jonas is a 57 y.o. female  Chief Complaint   Patient presents with    Morbid Obesity     Completed insurance requirements, review prior to submission. Visit Vitals  BP (!) 143/63 (BP 1 Location: Left upper arm, BP Patient Position: Sitting, BP Cuff Size: Large adult)   Pulse 98   Temp 98.6 °F (37 °C) (Oral)   Resp 16   Ht 5' 2.5\" (1.588 m)   Wt 218 lb (98.9 kg)   LMP 1998   SpO2 96%   BMI 39.24 kg/m²       1. Have you been to the ER, urgent care clinic since your last visit? Hospitalized since your last visit? No    2. Have you seen or consulted any other health care providers outside of the [address] [place] [address] since your last visit? Include any pap smears or colon screening.  No

## 2022-11-10 ENCOUNTER — TELEPHONE (OUTPATIENT)
Dept: SURGERY | Age: 62
End: 2022-11-10

## 2022-11-10 NOTE — TELEPHONE ENCOUNTER
Pt called me back. Spoke to patient,  I offered 1/12/23 for surgery and she excepted. I let her know that I will be scheduling her per op appointments which are Diet and infor class, PAT and H&P. That is any of these appointments are a no show or rescheduled it could push her surgery out. She understood. I also let her know that I will schedule her post op appointment and that once everything is scheduled I will put in all in a letter with dates, times and if they are virtual or in person. This letter will post to [facility] and I will mail it out. I will also call to let you know when it has been posted. I told she should should hear from me by end of day today if not sometime on Monday.       She acknowledged ok and thank you

## 2022-11-10 NOTE — TELEPHONE ENCOUNTER
LM letting pt know her surgery letter has posted to her mychart and will go out in the mail tomorrow. To please review and call me if there is any questions.

## 2022-11-30 DIAGNOSIS — I10 ESSENTIAL HYPERTENSION: ICD-10-CM

## 2022-12-01 ENCOUNTER — OFFICE VISIT (OUTPATIENT)
Dept: SURGERY | Age: 62
End: 2022-12-01

## 2022-12-01 DIAGNOSIS — E66.01 MORBID OBESITY (HCC): Primary | ICD-10-CM

## 2022-12-01 RX ORDER — LOSARTAN POTASSIUM AND HYDROCHLOROTHIAZIDE 25; 100 MG/1; MG/1
TABLET ORAL
Qty: 90 TABLET | Refills: 0 | Status: SHIPPED | OUTPATIENT
Start: 2022-12-01

## 2022-12-02 NOTE — PROGRESS NOTES
[facility] at [facility]  Supervised Weight Loss     Date:   2022    Patient's Name: Sidra Mckeon  : 1960    This patient attended a 1 hour virtual nutrition class as part of the [facility] Bariatric Surgery program at [address] has previously completed required nutrition visits as part of pre-approval process and is now approved and scheduled for surgery. Is attending today's class as an optional session.  We reviewed key nutrition guidelines for bariatric surgery including portion sizes, protein, fluids, vitamins, exercise and behavior modification    Soham Doe, DAVID  2022

## 2022-12-19 ENCOUNTER — HOSPITAL ENCOUNTER (OUTPATIENT)
Dept: PREADMISSION TESTING | Age: 62
Discharge: HOME | End: 2022-12-19
Payer: MEDICAID

## 2022-12-19 VITALS
WEIGHT: 219.6 LBS | OXYGEN SATURATION: 98 % | RESPIRATION RATE: 20 BRPM | SYSTOLIC BLOOD PRESSURE: 121 MMHG | HEIGHT: 62 IN | HEART RATE: 83 BPM | TEMPERATURE: 98.2 F | DIASTOLIC BLOOD PRESSURE: 77 MMHG | BODY MASS INDEX: 40.41 KG/M2

## 2022-12-19 LAB
ALBUMIN SERPL-MCNC: 3.9 G/DL (ref 3.5–5)
ALBUMIN/GLOB SERPL: 1.3 {RATIO} (ref 1.1–2.2)
ALP SERPL-CCNC: 95 U/L (ref 45–117)
ALT SERPL-CCNC: 26 U/L (ref 12–78)
ANION GAP SERPL CALC-SCNC: 4 MMOL/L (ref 5–15)
AST SERPL-CCNC: 16 U/L (ref 15–37)
ATRIAL RATE: 84 BPM
BILIRUB SERPL-MCNC: 0.4 MG/DL (ref 0.2–1)
BUN SERPL-MCNC: 18 MG/DL (ref 6–20)
BUN/CREAT SERPL: 24 (ref 12–20)
CALCIUM SERPL-MCNC: 9.6 MG/DL (ref 8.5–10.1)
CALCULATED P AXIS, ECG09: 3 DEGREES
CALCULATED R AXIS, ECG10: 8 DEGREES
CALCULATED T AXIS, ECG11: 8 DEGREES
CHLORIDE SERPL-SCNC: 101 MMOL/L (ref 97–108)
CO2 SERPL-SCNC: 29 MMOL/L (ref 21–32)
CREAT SERPL-MCNC: 0.76 MG/DL (ref 0.55–1.02)
DIAGNOSIS, 93000: NORMAL
ERYTHROCYTE [DISTWIDTH] IN BLOOD BY AUTOMATED COUNT: 13.3 % (ref 11.5–14.5)
EST. AVERAGE GLUCOSE BLD GHB EST-MCNC: 148 MG/DL
FOLATE SERPL-MCNC: 52.6 NG/ML (ref 5–21)
GLOBULIN SER CALC-MCNC: 3 G/DL (ref 2–4)
GLUCOSE SERPL-MCNC: 152 MG/DL (ref 65–100)
HBA1C MFR BLD: 6.8 % (ref 4–5.6)
HCT VFR BLD AUTO: 36.1 % (ref 35–47)
HGB BLD-MCNC: 12 G/DL (ref 11.5–16)
IRON SATN MFR SERPL: 13 % (ref 20–50)
IRON SERPL-MCNC: 52 UG/DL (ref 35–150)
MCH RBC QN AUTO: 28.6 PG (ref 26–34)
MCHC RBC AUTO-ENTMCNC: 33.2 G/DL (ref 30–36.5)
MCV RBC AUTO: 86 FL (ref 80–99)
NRBC # BLD: 0 K/UL (ref 0–0.01)
NRBC BLD-RTO: 0 PER 100 WBC
P-R INTERVAL, ECG05: 168 MS
PLATELET # BLD AUTO: 396 K/UL (ref 150–400)
PMV BLD AUTO: 9 FL (ref 8.9–12.9)
POTASSIUM SERPL-SCNC: 4.7 MMOL/L (ref 3.5–5.1)
PROT SERPL-MCNC: 6.9 G/DL (ref 6.4–8.2)
Q-T INTERVAL, ECG07: 362 MS
QRS DURATION, ECG06: 82 MS
QTC CALCULATION (BEZET), ECG08: 427 MS
RBC # BLD AUTO: 4.2 M/UL (ref 3.8–5.2)
SODIUM SERPL-SCNC: 134 MMOL/L (ref 136–145)
TIBC SERPL-MCNC: 402 UG/DL (ref 250–450)
TSH SERPL DL<=0.05 MIU/L-ACNC: 3 UIU/ML (ref 0.36–3.74)
VENTRICULAR RATE, ECG03: 84 BPM
VIT B12 SERPL-MCNC: 418 PG/ML (ref 193–986)
WBC # BLD AUTO: 7.8 K/UL (ref 3.6–11)

## 2022-12-19 PROCEDURE — 84425 ASSAY OF VITAMIN B-1: CPT

## 2022-12-19 PROCEDURE — 36415 COLL VENOUS BLD VENIPUNCTURE: CPT

## 2022-12-19 PROCEDURE — 82652 VIT D 1 25-DIHYDROXY: CPT

## 2022-12-19 PROCEDURE — 82607 VITAMIN B-12: CPT

## 2022-12-19 PROCEDURE — 85027 COMPLETE CBC AUTOMATED: CPT

## 2022-12-19 PROCEDURE — 82746 ASSAY OF FOLIC ACID SERUM: CPT

## 2022-12-19 PROCEDURE — 84443 ASSAY THYROID STIM HORMONE: CPT

## 2022-12-19 PROCEDURE — 93005 ELECTROCARDIOGRAM TRACING: CPT

## 2022-12-19 PROCEDURE — 83540 ASSAY OF IRON: CPT

## 2022-12-19 PROCEDURE — 83036 HEMOGLOBIN GLYCOSYLATED A1C: CPT

## 2022-12-19 PROCEDURE — 80053 COMPREHEN METABOLIC PANEL: CPT

## 2022-12-19 RX ORDER — ACETAMINOPHEN 500 MG
1000 TABLET ORAL
COMMUNITY

## 2022-12-19 NOTE — PERIOP NOTES
BARIATRIC PRE-OP AND MEDICATION INSTRUCTIONS PRINTED AND REVIEWED WITH PATIENT. TWO BOTTLES OF CHG SOAP GIVEN. SURGICAL SITE INFECTION SHEET GIVEN. OPPORTUNITY FOR QUESTIONS GIVEN AND ALL QUESTIONS WERE ANSWERED.     CONSENT SIGNED AND ON CHART

## 2022-12-19 NOTE — PERIOP NOTES
[address] [facility]  BARIATRIC PREOPERATIVE INSTRUCTIONS    Surgery Date:   01-    Your surgeon's office or [facility] staff will call you between 4 PM- 8 PM the day before surgery with your arrival time. If your surgery is on a Monday, you will receive a call the preceding Friday. Please report to [facility] Patient Access/Admitting on the 1st floor. Bring your insurance card, photo identification, and any copayment ( if applicable). If you are going home the same day of your surgery, you must have a responsible adult to drive you home. You need to have a responsible adult to stay with you the first 24 hours after surgery and you should not drive a car for 24 hours following your surgery. Continue your liver shrinking diet until the night before surgery. Do NOT eat any solid foods after midnight the night before surgery including candy, mint or gum. You may drink clear liquids from midnight until 1 hour prior to your arrival. You may drink up to 12 ounces at one time every 4 hours. Please note special instructions, if applicable, below for medications. Absolutely NO alcohol of any kind 2 weeks before surgery and continue to avoid after surgery. Alcohol is not safe before or after surgery. Please discuss with your bariatric provider before resuming alcohol use. You must be 100% smoke free (tobacco and marijuana) for at least 3 months prior to surgery. Surgery will be cancelled if you are smoking. If you are being admitted to the hospital, please leave personal belongings/luggage in your car until you have an assigned hospital room number. Please wear comfortable clothes. Wear your glasses instead of contacts. We ask that all money, jewelry and valuables be left at home. Wear no make up, particularly mascara, the day of surgery. All body piercings, rings, and jewelry need to be removed and left at home. Please remove any nail polish or artifical nails from your fingernails.  Please wear your hair loose or down. Please no pony-tails, buns, or any metal hair accessories. If you shower the morning of surgery, please do not apply any lotions or powders afterwards. You may wear deodorant. Do not shave any body area within 24 hours of your surgery. Please follow all instructions to avoid any potential surgical cancellation. Should your physical condition change, (i.e. fever, cold, flu, etc.) please notify your surgeon as soon as possible. It is important to be on time. If a situation occurs where you may be delayed, please call:  [phone] / [phone] on the day of surgery. The Preadmission Testing staff can be reached at [phone]. Special instructions: BRING CPAP TO HOSPITAL ON DAY OF SURGERY      Current Outpatient Medications   Medication Sig    psyllium (METAMUCIL) pack (sugar free) packet Take 1 Packet by mouth as needed. 1-2 teaspoons with 8 ounces of water    acetaminophen (Tylenol Extra Strength) 500 mg tablet Take 1,000 mg by mouth every six (6) hours as needed for Pain. FLUoxetine (PROzac) 20 mg capsule TAKE 3 CAPSULES BY MOUTH EVERY DAY    losartan-hydroCHLOROthiazide (HYZAAR) 100-25 mg per tablet TAKE 1 TABLET BY MOUTH EVERY DAY    rosuvastatin (CRESTOR) 20 mg tablet TAKE 1 TABLET BY MOUTH EVERY DAY (Patient taking differently: nightly.)    amLODIPine (NORVASC) 5 mg tablet TAKE 1 TABLET BY MOUTH EVERY DAY    multivitamin (ONE A DAY) tablet Take 1 Tablet by mouth daily. calcium citrate 200 mg (950 mg) tablet Take 400 mg by mouth two (2) times a day. FreeStyle Sarath 2 Sensor kit     BD Insulin Syringe U-500 1/2 mL 31 gauge x 15/64\" syrg USE AS DIRECTED 3 TIMES A DAY    ketotifen (ZADITOR) 0.025 % (0.035 %) ophthalmic solution Administer 1 Drop to both eyes as needed. fluticasone (FLONASE ALLERGY RELIEF) 50 mcg/actuation nasal spray 2 Sprays by Both Nostrils route daily.  (Patient taking differently: 1 Spray by Both Nostrils route daily.)    metFORMIN ER (GLUCOPHAGE XR) 500 mg tablet Take 2 Tabs by mouth two (2) times a day. insulin CONCENTRATED regular (HUMULIN R U-500) 500 unit/mL soln by SubCUTAneous route. 65 units before breastfast and lunch, 20 units before bed    aspirin 81 mg Tab Take  by mouth.    triamcinolone (ARISTOCORT) 0.5 % topical cream Apply  to affected area two (2) times a day. use thin layer as needed    cholecalciferol, vitamin D3, 50 mcg (2,000 unit) tab Take  by mouth nightly. No current facility-administered medications for this encounter. YOU MUST ONLY TAKE THESE MEDICATIONS THE MORNING OF SURGERY WITH A SIP OF WATER:AMLODIPINE,FLUOXETINE  MEDICATIONS TO TAKE THE MORNING OF SURGERY ONLY IF NEEDED: TYLENOL IF NEEDED MORNING OF SURGERY  HOLD these prescription medications BEFORE Surgery: HOLD METFORMIN 25 HOURS PRIOR TO SCHEDULED SURGERY  Ask your surgeon/prescribing physician about when/if to STOP taking these medications: NONE  Stop all vitamins, herbal medicines, Aspirin containing products and any non-steroidal anti-inflammatory drugs (i.e. Ibuprofen, Naproxen, Advil, Aleve) 7 days prior to surgery. You may take Tylenol. If you are currently taking Plavix, Coumadin, or any other blood-thinning/anticoagulant medication contact your prescribing physician for instructions. Preventing Infections Before and After - Your Surgery    IMPORTANT INSTRUCTIONS     You play an important role in your health and preparation for surgery. To reduce the germs on your skin you will need to shower with CHG soap (Chorhexidine gluconate 4%) two times before surgery. CHG soap (Hibiclens, Hex-A-Clens or store brand)  CHG soap will be provided at your Preadmission Testing (PAT) appointment. If you do not have a PAT appointment before surgery, you may arrange to  CHG soap from our office or purchase CHG soap at a pharmacy, grocery or department store. You need to purchase TWO 4 ounce bottles to use for your 2 showers.     Steps to follow:  Wash your hair with your normal shampoo and your body with regular soap and rinse well to remove shampoo and soap from your skin. Wet a clean washcloth and turn off the shower. Put CHG soap on washcloth and apply to your entire body from the neck down. Do not use on your head, face or private parts(genitals). Do not use CHG soap on open sores, wounds or areas of skin irritation. Wash you body gently for 5 minutes. Do not wash your skin too hard. This soap does not create lather. Pay special attention to your underarms and from your belly button to your feet. Turn the shower back on and rinse well to get CHG soap off your body. Pat your skin dry with a clean, dry towel. Do not apply lotions or moisturizer. Put on clean clothes and sleep on fresh bed sheets and do not allow pets to sleep with you. Shower with CHG soap 2 times before your surgery  The evening before your surgery  The morning of your surgery      Tips to help prevent infections after your surgery:  Protect your surgical wound from germs:  Hand washing is the most important thing you and your caregivers can do to prevent infections. Keep your bandage clean and dry! Do not touch your surgical wound. Use clean, freshly washed towels and washcloths every time you shower; do not share bath linens with others. Until your surgical wound is healed, wear clothing and sleep on bed linens each day that are clean and freshly washed. Do not allow pets to sleep in your bed with you or touch your surgical wound. Do not smoke - smoking delays wound healing. This may be a good time to stop smoking. If you have diabetes, it is important for you to manage your blood sugar levels properly before your surgery as well as after your surgery. Poorly managed blood sugar levels slow down wound healing and prevent you from healing completely.     Eating and Drinking Before Bariatric Surgery    You may eat your liver shrinking diet at the usual time on the day before your surgery. Do NOT eat any solid foods after midnight. You may drink clear liquids only from 12 midnight until 1 hours prior to your arrival time at the hospital on the day of your surgery. Do NOT drink alcohol. Clear liquids include:  Water  Flavored, sugar-free water  Crystal Light, Bud or sugar free generic  Sugar-free Aidan-Aid or generic  Decaffeinated tea or coffee  Vitamin Water Zero  Powerade Zero  Gatorade Zero  Clear Protein drinks  Diet V-8 Splash  Diet Snapple  Diet Lemonade  You may drink up to 12 ounces at one time every 4 hours between the hours of midnight and 1 hour before your arrival time at the hospital. Example- if your arrival time at the hospital is 6am, you may drink 12 ounces of clear liquids no later than 5am.  If you have any questions, please contact your surgeon's office. Patient Information Regarding COVID Restrictions      Day of Procedure    Please park in the parking deck or any designated visitor parking lot. Enter the facility through the Main Entrance of the hospital.  On the day of surgery, please provide the cell phone number of the person who will be waiting for you to the Patient Access representative at the time of registration. Masks are highly recommended in the hospital, but not required. Once your procedure and the immediate recovery period is completed, a nurse in the recovery area will contact your designated visitor to inform them of your room number or to otherwise review other pertinent information regarding your care. Social distancing practices are strongly encouraged in waiting areas and the cafeteria. The patient was contacted in person. She verbalized understanding of all instructions does not need reinforcement.

## 2022-12-20 LAB — 1,25(OH)2D3 SERPL-MCNC: 35 PG/ML (ref 24.8–81.5)

## 2022-12-20 NOTE — PERIOP NOTES
MSG SENT TO Marylu Null LPN FOR DR. CHRISTINA STEINBERG REGARDING ABNORMAL PAT LABS:    A1C: 6.8  FE SATURATION 13  SODIUM 134  GLUCOSE 152  FOLATE 52.6

## 2022-12-21 ENCOUNTER — OFFICE VISIT (OUTPATIENT)
Dept: SURGERY | Age: 62
End: 2022-12-21
Payer: MEDICAID

## 2022-12-21 VITALS — BODY MASS INDEX: 41.23 KG/M2 | WEIGHT: 218.4 LBS | HEIGHT: 61 IN

## 2022-12-21 DIAGNOSIS — G89.18 POSTOPERATIVE PAIN: ICD-10-CM

## 2022-12-21 DIAGNOSIS — E78.00 HYPERCHOLESTEREMIA: ICD-10-CM

## 2022-12-21 DIAGNOSIS — I10 HYPERTENSION, UNSPECIFIED TYPE: ICD-10-CM

## 2022-12-21 DIAGNOSIS — E66.01 MORBID OBESITY (HCC): Primary | ICD-10-CM

## 2022-12-21 DIAGNOSIS — E11.9 CONTROLLED TYPE 2 DIABETES MELLITUS WITHOUT COMPLICATION, UNSPECIFIED WHETHER LONG TERM INSULIN USE (HCC): ICD-10-CM

## 2022-12-21 LAB — VIT B1 BLD-SCNC: 152.2 NMOL/L (ref 66.5–200)

## 2022-12-21 PROCEDURE — 99024 POSTOP FOLLOW-UP VISIT: CPT | Performed by: NURSE PRACTITIONER

## 2022-12-21 RX ORDER — GABAPENTIN 100 MG/1
100-200 CAPSULE ORAL 3 TIMES DAILY
Qty: 30 CAPSULE | Refills: 0 | Status: SHIPPED | OUTPATIENT
Start: 2022-12-21 | End: 2022-12-26

## 2022-12-21 RX ORDER — POLYETHYLENE GLYCOL 3350 17 G/17G
17 POWDER, FOR SOLUTION ORAL DAILY
Qty: 510 G | Refills: 0 | Status: SHIPPED | OUTPATIENT
Start: 2022-12-21 | End: 2023-01-20

## 2022-12-21 RX ORDER — ONDANSETRON 4 MG/1
4 TABLET, ORALLY DISINTEGRATING ORAL
Qty: 30 TABLET | Refills: 0 | Status: SHIPPED | OUTPATIENT
Start: 2022-12-21

## 2022-12-21 RX ORDER — OMEPRAZOLE 40 MG/1
40 CAPSULE, DELAYED RELEASE ORAL DAILY
Qty: 90 CAPSULE | Refills: 0 | Status: SHIPPED | OUTPATIENT
Start: 2022-12-21

## 2022-12-21 NOTE — PATIENT INSTRUCTIONS
You will start the liver shrinking diet 2 weeks before surgery unless otherwise told by physician or NP. Follow your handbooks instructions for Liver shrinking diet. Enhanced Recovery after Surgery (ERAS)  Take 1000mg of Tylenol with lunch and dinner the day before surgery. You may drink clear liquids up to 1 hours before surgery. Do NOT start medications prescribed until after surgery. Your first follow up visit will be a face to face visit. Your second and third follow up will be virtual.     Diet after surgery until your 2 week follow up visit. Bariatric Full Liquids - 2 weeks   What is this diet? Easy to swallow liquids allow your stomach to heal after surgery  Prevents dehydration  Introduction of liquid meals (protein shakes)  When do I begin? The morning after surgery  Use 1 ounce (30 mL) cups  Initial goal is to drink 4 ounces of fluid over 1 hour (3 oz. water + 1 oz. protein shake). The rate at which you drink should be controlled. Take a sip and evaluate how you feel before you continue to drink. Repeat every hour while awake  Discharge Goal:  Consume & tolerate at least 4 ounces per hour for 4 hours  Stay on liquids for 2 weeks at home  What foods can I eat? No sugar added, non-carbonated, non-caffeinated fluids  Meal replacement shakes (2-3 per day), from the approved list  Strained, blenderized soup  Limit juice to 4 ounces per day. Choose only 100% no sugar added fruit juice. Juice can be diluted with water. Key Points  Sip, do not gulp  Use 1 ounce medicine cups to control the rate    Stop when full. If you feel fullness, pain or nausea stop sipping until the feeling passes  Do not drink from a straw  Fluid Goal:  48-64 ounces of liquids every day. 48 ounces per day should be from clear liquids. Sip, sip, sip throughout the day  Main priority is to stay hydrated  Aim for 60 grams of protein every day. Most of your protein will come from shakes.   Add additional protein supplements to meet protein needs  Limit caffeine  Avoid alcohol  Record the ounces of liquids and shakes consumed per day in the log provided   Bring the log to your surgeon's appointments to monitor hydration and  protein intake

## 2022-12-21 NOTE — PROGRESS NOTES
Identified pt with two pt identifiers (name and ). Reviewed chart in preparation for visit and have obtained necessary documentation. Ruth Villalta is a 57 y.o. female  Chief Complaint   Patient presents with    Surg H&P     VOV H&P FOR ROBOTIC GASTRIC BYPASS, POSSIBLE GASTRIC SLEEVE WITH EGD *ERAS*-23 -- [phone]     Visit Vitals  Ht 5' 1\" (1.549 m)   Wt 218 lb 6.4 oz (99.1 kg)   LMP 1998   BMI 41.27 kg/m²       1. Have you been to the ER, urgent care clinic since your last visit? Hospitalized since your last visit? No    2. Have you seen or consulted any other health care providers outside of the [address] [place] [address] since your last visit? Include any pap smears or colon screening.  No

## 2022-12-21 NOTE — PROGRESS NOTES
Visit Vitals   5' 1\" (1.549 m)   Wt 218 lb 6.4 oz (99.1 kg)   LMP 06/01/1998   BMI 41.27 kg/m²       ICD-10-CM ICD-9-CM    1. Morbid obesity ([facility]  E66.01 278.01       2. BMI 40.0-44.9, adult ([facility]  Z68.41 V85.41       3. Hypertension, unspecified type  I10 401.9       4. Controlled type 2 diabetes mellitus without complication, unspecified whether long term insulin use (HCC)  E11.9 250.00       5. Hypercholesteremia  E78.00 272.0       6. Postoperative pain  G89.18 338.18 gabapentin (NEURONTIN) 100 mg capsule           Plan:   1/2 Morbid Obesity- Patient is schedule for Robotic Gastric bypass  with. Dr. Alaina Valladares on 1/12/2023 at [address] patient in regard to post diet restrictions, follow- up office visits, follow- up care. Patient as received educational booklet and vitamin list. Reviewed liver shrinking diet. Start date for Liver shrinking diet 12/29/2022  ERAS protocol reviewed:  Acetaminophen 1000 mg at noon and 8 pm day before surgery and may have clear liquids (no caffeine, no ETOH, no carbonation and calorie free) until 3 hours prior to surgery   Preadmission testing results reviewed with patient   Post operative prescriptions sent to pharmacy on file for AFTER surgery:    Acid suppression  Prilosec 40 mg x 30 days, then reassess need    Antiemetic Zofran 4 mg every 8 hours as needed for nausea #30   Antispasmodic na  Laxative:  Miralax 1 packet every day   DVT prophylaxis:   early ambulation and mechanical compression, non-smoker for at least 90 days   Post op pain management:  Gabapentin 100-200 mg q 8 hours prn pain x 5 days; acetaminophen 1000 mg po q 8 hours prn; abdominal support / splinting; heating pad prn   Reviewed with patient that lifelong vitamin supplementation is recommended by provider as well as risks of non-compliance. 3/4/5. HTN/DM/Hyperhcholesterolemia- follow up with PCP 4 weeks post op.        Pt verbalized understanding and questions were answered to the best of my knowledge and ability. diet educational materials were provided. I was in the office while conducting this encounter. Consent:  She and/or her healthcare decision maker is aware that this patient-initiated Telehealth encounter is a billable service, with coverage as determined by her insurance carrier. She is aware that she may receive a bill and has provided verbal consent to proceed: Yes    This virtual visit was conducted telephone encounter only. -  I affirm this is a Patient Initiated Episode with an Established Patient who has not had a related appointment within my department in the past 7 days or scheduled within the next 24 hours. Note: this encounter is not billable if this call serves to triage the patient into an appointment for the relevant concern. Total Time: minutes: 11-20 minutes.      Signed By: Benjamín Mcguire NP     December 21, 2022

## 2023-01-03 ENCOUNTER — OFFICE VISIT (OUTPATIENT)
Dept: SURGERY | Age: 63
End: 2023-01-03
Payer: MEDICAID

## 2023-01-03 VITALS
TEMPERATURE: 98.5 F | SYSTOLIC BLOOD PRESSURE: 122 MMHG | HEIGHT: 61 IN | HEART RATE: 96 BPM | BODY MASS INDEX: 40.59 KG/M2 | WEIGHT: 215 LBS | OXYGEN SATURATION: 96 % | DIASTOLIC BLOOD PRESSURE: 76 MMHG | RESPIRATION RATE: 18 BRPM

## 2023-01-03 DIAGNOSIS — E66.01 MORBID OBESITY (HCC): Primary | ICD-10-CM

## 2023-01-03 PROCEDURE — 3078F DIAST BP <80 MM HG: CPT | Performed by: SURGERY

## 2023-01-03 PROCEDURE — 99213 OFFICE O/P EST LOW 20 MIN: CPT | Performed by: SURGERY

## 2023-01-03 PROCEDURE — 3074F SYST BP LT 130 MM HG: CPT | Performed by: SURGERY

## 2023-01-03 NOTE — PROGRESS NOTES
Identified pt with two pt identifiers (name and ). Reviewed chart in preparation for visit and have obtained necessary documentation. Kathia Laughlin is a 57 y.o. female  Chief Complaint   Patient presents with    Morbid Obesity     REVIEW-ROBOTIC GASTRIC BYPASS, POSSIBLE GASTRIC SLEEVE WITH EGD *ERAS* 23     Visit Vitals  /76 (BP 1 Location: Left upper arm, BP Patient Position: Sitting, BP Cuff Size: Large adult)   Pulse 96   Temp 98.5 °F (36.9 °C) (Oral)   Resp 18   Ht 5' 1\" (1.549 m)   Wt 215 lb (97.5 kg)   LMP 1998   SpO2 96%   BMI 40.62 kg/m²       1. Have you been to the ER, urgent care clinic since your last visit? Hospitalized since your last visit? No    2. Have you seen or consulted any other health care providers outside of the [address] [place] [address] since your last visit? Include any pap smears or colon screening.  No

## 2023-01-03 NOTE — PROGRESS NOTES
Surgery Progress Note    1/3/2023    CC: Obesity    Subjective:     Patient doing well. Down 3 more pounds from last visit. Has a plan for her insulin. Cleared by our NP preoperatively. Questions about postoperative care and expectations. Constitutional: No fever or chills  Neurologic: No headache  Eyes: No scleral icterus or irritated eyes  Nose: No nasal pain or drainage  Mouth: No oral lesions or sore throat  Cardiac: No palpations or chest pain  Pulmonary: No cough or shortness of breath  Gastrointestinal: No nausea, emesis, diarrhea, or constipation  Genitourinary: No dysuria  Musculoskeletal: No muscle or joint tenderness  Skin: No rashes or lesions  Psychiatric: No anxiety or depressed mood    Objective:   Visit Vitals  /76 (BP 1 Location: Left upper arm, BP Patient Position: Sitting, BP Cuff Size: Large adult)   Pulse 96   Temp 98.5 °F (36.9 °C) (Oral)   Resp 18   Ht 5' 1\" (1.549 m)   Wt 215 lb (97.5 kg)   SpO2 96%   BMI 40.62 kg/m²       General: No acute distress, conversant  Eyes: PERRLA, no scleral icterus  HENT: Normocephalic without oral lesions  Neck: Trachea midline without LAD  Cardiac: Normal pulse rate and rhythm  Pulmonary: Symmetric chest rise with normal effort  GI: Soft, obese, NT, ND, no hernia, no splenomegaly  Skin: Warm without rash  Extremities: No edema or joint stiffness  Psych: Appropriate mood and affect    Assessment:     64-year-old female preparing for gastric bypass versus sleeve next week    Plan:     Questions about surgery answered. Discussed insulin plan. Discussed postoperative care and help at home. We discussed the risk of bleeding, infection, leak. Discussed long-term vitamins and dietary compliance. Patient ready for surgery next week.       Winston Steele MD  Bariatric and General Surgeon  [facility]

## 2023-01-03 NOTE — H&P (VIEW-ONLY)
Surgery Progress Note    1/3/2023    CC: Obesity    Subjective:     Patient doing well. Down 3 more pounds from last visit. Has a plan for her insulin. Cleared by our NP preoperatively. Questions about postoperative care and expectations. Constitutional: No fever or chills  Neurologic: No headache  Eyes: No scleral icterus or irritated eyes  Nose: No nasal pain or drainage  Mouth: No oral lesions or sore throat  Cardiac: No palpations or chest pain  Pulmonary: No cough or shortness of breath  Gastrointestinal: No nausea, emesis, diarrhea, or constipation  Genitourinary: No dysuria  Musculoskeletal: No muscle or joint tenderness  Skin: No rashes or lesions  Psychiatric: No anxiety or depressed mood    Objective:   Visit Vitals  /76 (BP 1 Location: Left upper arm, BP Patient Position: Sitting, BP Cuff Size: Large adult)   Pulse 96   Temp 98.5 °F (36.9 °C) (Oral)   Resp 18   Ht 5' 1\" (1.549 m)   Wt 215 lb (97.5 kg)   SpO2 96%   BMI 40.62 kg/m²       General: No acute distress, conversant  Eyes: PERRLA, no scleral icterus  HENT: Normocephalic without oral lesions  Neck: Trachea midline without LAD  Cardiac: Normal pulse rate and rhythm  Pulmonary: Symmetric chest rise with normal effort  GI: Soft, obese, NT, ND, no hernia, no splenomegaly  Skin: Warm without rash  Extremities: No edema or joint stiffness  Psych: Appropriate mood and affect    Assessment:     58-year-old female preparing for gastric bypass versus sleeve next week    Plan:     Questions about surgery answered. Discussed insulin plan. Discussed postoperative care and help at home. We discussed the risk of bleeding, infection, leak. Discussed long-term vitamins and dietary compliance. Patient ready for surgery next week.       Jerie Halsted, MD  Bariatric and General Surgeon  [facility]

## 2023-01-12 ENCOUNTER — ANESTHESIA EVENT (OUTPATIENT)
Dept: SURGERY | Age: 63
DRG: 403 | End: 2023-01-12
Payer: MEDICAID

## 2023-01-12 ENCOUNTER — ANESTHESIA (OUTPATIENT)
Dept: SURGERY | Age: 63
DRG: 403 | End: 2023-01-12
Payer: MEDICAID

## 2023-01-12 ENCOUNTER — HOSPITAL ENCOUNTER (INPATIENT)
Age: 63
LOS: 1 days | Discharge: HOME | DRG: 403 | End: 2023-01-13
Attending: SURGERY | Admitting: SURGERY
Payer: MEDICAID

## 2023-01-12 DIAGNOSIS — E66.01 MORBID OBESITY (HCC): ICD-10-CM

## 2023-01-12 DIAGNOSIS — E66.01 MORBID OBESITY (HCC): Primary | ICD-10-CM

## 2023-01-12 LAB
GLUCOSE BLD STRIP.AUTO-MCNC: 273 MG/DL (ref 65–117)
GLUCOSE BLD STRIP.AUTO-MCNC: 304 MG/DL (ref 65–117)
GLUCOSE BLD STRIP.AUTO-MCNC: 314 MG/DL (ref 65–117)
GLUCOSE BLD STRIP.AUTO-MCNC: 362 MG/DL (ref 65–117)
SERVICE CMNT-IMP: ABNORMAL

## 2023-01-12 PROCEDURE — 0DNU4ZZ RELEASE OMENTUM, PERCUTANEOUS ENDOSCOPIC APPROACH: ICD-10-PCS | Performed by: SURGERY

## 2023-01-12 PROCEDURE — 77030022704 HC SUT VLOC COVD -B: Performed by: SURGERY

## 2023-01-12 PROCEDURE — 74011636637 HC RX REV CODE- 636/637: Performed by: SURGERY

## 2023-01-12 PROCEDURE — 77030035279 HC SEAL VSL ENDOWR XI INTU -I2: Performed by: SURGERY

## 2023-01-12 PROCEDURE — 74011636637 HC RX REV CODE- 636/637: Performed by: ANESTHESIOLOGY

## 2023-01-12 PROCEDURE — 77030039895 HC SYST SMK EVAC LAP COVD -B: Performed by: SURGERY

## 2023-01-12 PROCEDURE — 76210000016 HC OR PH I REC 1 TO 1.5 HR: Performed by: SURGERY

## 2023-01-12 PROCEDURE — 77030013079 HC BLNKT BAIR HGGR 3M -A: Performed by: ANESTHESIOLOGY

## 2023-01-12 PROCEDURE — 77030035277 HC OBTRTR BLDELSS DISP INTU -B: Performed by: SURGERY

## 2023-01-12 PROCEDURE — 77030034628 HC LIGASURE LAP SEAL DIV MD COVD -F: Performed by: SURGERY

## 2023-01-12 PROCEDURE — 74011250636 HC RX REV CODE- 250/636: Performed by: SURGERY

## 2023-01-12 PROCEDURE — 77030040361 HC SLV COMPR DVT MDII -B: Performed by: SURGERY

## 2023-01-12 PROCEDURE — 76010000877 HC OR TIME 2.5 TO 3HR INTENSV - TIER 2: Performed by: SURGERY

## 2023-01-12 PROCEDURE — 77030011808 HC STPLR ENDOSCOPIC J&J -D: Performed by: SURGERY

## 2023-01-12 PROCEDURE — 77030026438 HC STYL ET INTUB CARD -A: Performed by: ANESTHESIOLOGY

## 2023-01-12 PROCEDURE — 77030008684 HC TU ET CUF COVD -B: Performed by: ANESTHESIOLOGY

## 2023-01-12 PROCEDURE — 77030034208 HC SLV GASTRCTMY 3D CAL SYS DISP BOEH -C: Performed by: SURGERY

## 2023-01-12 PROCEDURE — 74011250636 HC RX REV CODE- 250/636: Performed by: NURSE ANESTHETIST, CERTIFIED REGISTERED

## 2023-01-12 PROCEDURE — 74011250636 HC RX REV CODE- 250/636

## 2023-01-12 PROCEDURE — 77030008756 HC TU IRR SUC STRY -B: Performed by: SURGERY

## 2023-01-12 PROCEDURE — 0DJ08ZZ INSPECTION OF UPPER INTESTINAL TRACT, VIA NATURAL OR ARTIFICIAL OPENING ENDOSCOPIC: ICD-10-PCS | Performed by: SURGERY

## 2023-01-12 PROCEDURE — 77030010507 HC ADH SKN DERMBND J&J -B: Performed by: SURGERY

## 2023-01-12 PROCEDURE — 77030031139 HC SUT VCRL2 J&J -A: Performed by: SURGERY

## 2023-01-12 PROCEDURE — 77030016151 HC PROTCTR LNS DFOG COVD -B: Performed by: SURGERY

## 2023-01-12 PROCEDURE — 77030035278 HC STPLR SEAL ENDOWR INTU -B: Performed by: SURGERY

## 2023-01-12 PROCEDURE — 65270000046 HC RM TELEMETRY

## 2023-01-12 PROCEDURE — 77030003666 HC NDL SPINAL BD -A: Performed by: SURGERY

## 2023-01-12 PROCEDURE — 74011000250 HC RX REV CODE- 250: Performed by: SURGERY

## 2023-01-12 PROCEDURE — 2709999900 HC NON-CHARGEABLE SUPPLY: Performed by: SURGERY

## 2023-01-12 PROCEDURE — 77030034699 HC LIGASURE MRYLND LAP SEAL DIV COVD -F: Performed by: SURGERY

## 2023-01-12 PROCEDURE — 77030008603 HC TRCR ENDOSC EPATH J&J -C: Performed by: SURGERY

## 2023-01-12 PROCEDURE — 77030002933 HC SUT MCRYL J&J -A: Performed by: SURGERY

## 2023-01-12 PROCEDURE — 82962 GLUCOSE BLOOD TEST: CPT

## 2023-01-12 PROCEDURE — 74011250636 HC RX REV CODE- 250/636: Performed by: ANESTHESIOLOGY

## 2023-01-12 PROCEDURE — 77030002986 HC SUT PROL J&J -A: Performed by: SURGERY

## 2023-01-12 PROCEDURE — 74011250637 HC RX REV CODE- 250/637: Performed by: SURGERY

## 2023-01-12 PROCEDURE — 74011000250 HC RX REV CODE- 250: Performed by: NURSE ANESTHETIST, CERTIFIED REGISTERED

## 2023-01-12 PROCEDURE — 0D164ZA BYPASS STOMACH TO JEJUNUM, PERCUTANEOUS ENDOSCOPIC APPROACH: ICD-10-PCS | Performed by: SURGERY

## 2023-01-12 PROCEDURE — 77030040259 HC STPLR DEV SUREFORM DVNCI INTU -F: Performed by: SURGERY

## 2023-01-12 PROCEDURE — 77030002966 HC SUT PDS J&J -A: Performed by: SURGERY

## 2023-01-12 PROCEDURE — 77030020703 HC SEAL CANN DISP INTU -B: Performed by: SURGERY

## 2023-01-12 PROCEDURE — 76060000036 HC ANESTHESIA 2.5 TO 3 HR: Performed by: SURGERY

## 2023-01-12 PROCEDURE — 77030040260 HC STPLR RELD SUREFORM DVNCI INTU -C: Performed by: SURGERY

## 2023-01-12 PROCEDURE — 8E0W4CZ ROBOTIC ASSISTED PROCEDURE OF TRUNK REGION, PERCUTANEOUS ENDOSCOPIC APPROACH: ICD-10-PCS | Performed by: SURGERY

## 2023-01-12 RX ORDER — AMLODIPINE BESYLATE 5 MG/1
5 TABLET ORAL DAILY
Status: DISCONTINUED | OUTPATIENT
Start: 2023-01-13 | End: 2023-01-13 | Stop reason: HOSPADM

## 2023-01-12 RX ORDER — OXYCODONE AND ACETAMINOPHEN 5; 325 MG/1; MG/1
1 TABLET ORAL AS NEEDED
Status: DISCONTINUED | OUTPATIENT
Start: 2023-01-12 | End: 2023-01-12 | Stop reason: HOSPADM

## 2023-01-12 RX ORDER — GABAPENTIN 250 MG/5ML
500 SOLUTION ORAL ONCE
Status: COMPLETED | OUTPATIENT
Start: 2023-01-12 | End: 2023-01-12

## 2023-01-12 RX ORDER — SCOLOPAMINE TRANSDERMAL SYSTEM 1 MG/1
1 PATCH, EXTENDED RELEASE TRANSDERMAL ONCE
Status: DISCONTINUED | OUTPATIENT
Start: 2023-01-12 | End: 2023-01-13 | Stop reason: HOSPADM

## 2023-01-12 RX ORDER — PROPOFOL 10 MG/ML
INJECTION, EMULSION INTRAVENOUS AS NEEDED
Status: DISCONTINUED | OUTPATIENT
Start: 2023-01-12 | End: 2023-01-12 | Stop reason: HOSPADM

## 2023-01-12 RX ORDER — SODIUM CHLORIDE 0.9 % (FLUSH) 0.9 %
5-40 SYRINGE (ML) INJECTION AS NEEDED
Status: DISCONTINUED | OUTPATIENT
Start: 2023-01-12 | End: 2023-01-13 | Stop reason: HOSPADM

## 2023-01-12 RX ORDER — MIDAZOLAM HYDROCHLORIDE 1 MG/ML
0.5 INJECTION, SOLUTION INTRAMUSCULAR; INTRAVENOUS
Status: DISCONTINUED | OUTPATIENT
Start: 2023-01-12 | End: 2023-01-12 | Stop reason: HOSPADM

## 2023-01-12 RX ORDER — FLUOXETINE HYDROCHLORIDE 20 MG/1
20 CAPSULE ORAL DAILY
Status: DISCONTINUED | OUTPATIENT
Start: 2023-01-13 | End: 2023-01-13 | Stop reason: HOSPADM

## 2023-01-12 RX ORDER — ONDANSETRON 2 MG/ML
INJECTION INTRAMUSCULAR; INTRAVENOUS AS NEEDED
Status: DISCONTINUED | OUTPATIENT
Start: 2023-01-12 | End: 2023-01-12 | Stop reason: HOSPADM

## 2023-01-12 RX ORDER — SODIUM CHLORIDE 0.9 % (FLUSH) 0.9 %
5-40 SYRINGE (ML) INJECTION EVERY 8 HOURS
Status: DISCONTINUED | OUTPATIENT
Start: 2023-01-12 | End: 2023-01-12 | Stop reason: HOSPADM

## 2023-01-12 RX ORDER — FENTANYL CITRATE 50 UG/ML
INJECTION, SOLUTION INTRAMUSCULAR; INTRAVENOUS AS NEEDED
Status: DISCONTINUED | OUTPATIENT
Start: 2023-01-12 | End: 2023-01-12 | Stop reason: HOSPADM

## 2023-01-12 RX ORDER — HYDROMORPHONE HYDROCHLORIDE 1 MG/ML
1 INJECTION, SOLUTION INTRAMUSCULAR; INTRAVENOUS; SUBCUTANEOUS
Status: DISCONTINUED | OUTPATIENT
Start: 2023-01-12 | End: 2023-01-13 | Stop reason: HOSPADM

## 2023-01-12 RX ORDER — SIMETHICONE 80 MG
160 TABLET,CHEWABLE ORAL
Status: DISCONTINUED | OUTPATIENT
Start: 2023-01-12 | End: 2023-01-13 | Stop reason: HOSPADM

## 2023-01-12 RX ORDER — NEOSTIGMINE METHYLSULFATE 1 MG/ML
INJECTION, SOLUTION INTRAVENOUS AS NEEDED
Status: DISCONTINUED | OUTPATIENT
Start: 2023-01-12 | End: 2023-01-12 | Stop reason: HOSPADM

## 2023-01-12 RX ORDER — ENOXAPARIN SODIUM 100 MG/ML
40 INJECTION SUBCUTANEOUS ONCE
Status: COMPLETED | OUTPATIENT
Start: 2023-01-12 | End: 2023-01-12

## 2023-01-12 RX ORDER — ONDANSETRON 2 MG/ML
4 INJECTION INTRAMUSCULAR; INTRAVENOUS AS NEEDED
Status: DISCONTINUED | OUTPATIENT
Start: 2023-01-12 | End: 2023-01-12 | Stop reason: HOSPADM

## 2023-01-12 RX ORDER — HYDRALAZINE HYDROCHLORIDE 20 MG/ML
20 INJECTION INTRAMUSCULAR; INTRAVENOUS
Status: DISCONTINUED | OUTPATIENT
Start: 2023-01-12 | End: 2023-01-13 | Stop reason: HOSPADM

## 2023-01-12 RX ORDER — SODIUM CHLORIDE, SODIUM LACTATE, POTASSIUM CHLORIDE, CALCIUM CHLORIDE 600; 310; 30; 20 MG/100ML; MG/100ML; MG/100ML; MG/100ML
125 INJECTION, SOLUTION INTRAVENOUS CONTINUOUS
Status: DISCONTINUED | OUTPATIENT
Start: 2023-01-12 | End: 2023-01-12 | Stop reason: HOSPADM

## 2023-01-12 RX ORDER — SODIUM CHLORIDE AND POTASSIUM CHLORIDE 150; 450 MG/100ML; MG/100ML
150 INJECTION, SOLUTION INTRAVENOUS CONTINUOUS
Status: DISCONTINUED | OUTPATIENT
Start: 2023-01-12 | End: 2023-01-13

## 2023-01-12 RX ORDER — ONDANSETRON 2 MG/ML
4 INJECTION INTRAMUSCULAR; INTRAVENOUS
Status: DISCONTINUED | OUTPATIENT
Start: 2023-01-12 | End: 2023-01-13 | Stop reason: HOSPADM

## 2023-01-12 RX ORDER — NALOXONE HYDROCHLORIDE 0.4 MG/ML
0.4 INJECTION, SOLUTION INTRAMUSCULAR; INTRAVENOUS; SUBCUTANEOUS AS NEEDED
Status: DISCONTINUED | OUTPATIENT
Start: 2023-01-12 | End: 2023-01-13 | Stop reason: HOSPADM

## 2023-01-12 RX ORDER — FENTANYL CITRATE 50 UG/ML
INJECTION, SOLUTION INTRAMUSCULAR; INTRAVENOUS
Status: COMPLETED
Start: 2023-01-12 | End: 2023-01-12

## 2023-01-12 RX ORDER — CYCLOBENZAPRINE HCL 10 MG
10 TABLET ORAL
Status: DISCONTINUED | OUTPATIENT
Start: 2023-01-12 | End: 2023-01-13 | Stop reason: HOSPADM

## 2023-01-12 RX ORDER — BUPIVACAINE HYDROCHLORIDE AND EPINEPHRINE 5; 5 MG/ML; UG/ML
INJECTION, SOLUTION EPIDURAL; INTRACAUDAL; PERINEURAL AS NEEDED
Status: DISCONTINUED | OUTPATIENT
Start: 2023-01-12 | End: 2023-01-12 | Stop reason: HOSPADM

## 2023-01-12 RX ORDER — HYDROMORPHONE HYDROCHLORIDE 2 MG/1
2-4 TABLET ORAL
Status: DISCONTINUED | OUTPATIENT
Start: 2023-01-12 | End: 2023-01-13 | Stop reason: HOSPADM

## 2023-01-12 RX ORDER — MIDAZOLAM HYDROCHLORIDE 1 MG/ML
INJECTION, SOLUTION INTRAMUSCULAR; INTRAVENOUS AS NEEDED
Status: DISCONTINUED | OUTPATIENT
Start: 2023-01-12 | End: 2023-01-12 | Stop reason: HOSPADM

## 2023-01-12 RX ORDER — CYANOCOBALAMIN 1000 UG/ML
1000 INJECTION, SOLUTION INTRAMUSCULAR; SUBCUTANEOUS ONCE
Status: COMPLETED | OUTPATIENT
Start: 2023-01-13 | End: 2023-01-13

## 2023-01-12 RX ORDER — DIPHENHYDRAMINE HYDROCHLORIDE 50 MG/ML
12.5 INJECTION, SOLUTION INTRAMUSCULAR; INTRAVENOUS
Status: DISCONTINUED | OUTPATIENT
Start: 2023-01-12 | End: 2023-01-13 | Stop reason: HOSPADM

## 2023-01-12 RX ORDER — HYDROMORPHONE HYDROCHLORIDE 1 MG/ML
0.2 INJECTION, SOLUTION INTRAMUSCULAR; INTRAVENOUS; SUBCUTANEOUS
Status: DISCONTINUED | OUTPATIENT
Start: 2023-01-12 | End: 2023-01-12 | Stop reason: HOSPADM

## 2023-01-12 RX ORDER — FENTANYL CITRATE 50 UG/ML
25 INJECTION, SOLUTION INTRAMUSCULAR; INTRAVENOUS
Status: DISCONTINUED | OUTPATIENT
Start: 2023-01-12 | End: 2023-01-12 | Stop reason: HOSPADM

## 2023-01-12 RX ORDER — IBUPROFEN 200 MG
4 TABLET ORAL AS NEEDED
Status: DISCONTINUED | OUTPATIENT
Start: 2023-01-12 | End: 2023-01-13 | Stop reason: HOSPADM

## 2023-01-12 RX ORDER — DEXAMETHASONE SODIUM PHOSPHATE 4 MG/ML
INJECTION, SOLUTION INTRA-ARTICULAR; INTRALESIONAL; INTRAMUSCULAR; INTRAVENOUS; SOFT TISSUE AS NEEDED
Status: DISCONTINUED | OUTPATIENT
Start: 2023-01-12 | End: 2023-01-12 | Stop reason: HOSPADM

## 2023-01-12 RX ORDER — INSULIN GLARGINE 100 [IU]/ML
20 INJECTION, SOLUTION SUBCUTANEOUS 2 TIMES DAILY
Status: DISCONTINUED | OUTPATIENT
Start: 2023-01-12 | End: 2023-01-13

## 2023-01-12 RX ORDER — GLYCOPYRROLATE 0.2 MG/ML
INJECTION INTRAMUSCULAR; INTRAVENOUS AS NEEDED
Status: DISCONTINUED | OUTPATIENT
Start: 2023-01-12 | End: 2023-01-12 | Stop reason: HOSPADM

## 2023-01-12 RX ORDER — GABAPENTIN 100 MG/1
200 CAPSULE ORAL 2 TIMES DAILY
Status: DISCONTINUED | OUTPATIENT
Start: 2023-01-12 | End: 2023-01-13 | Stop reason: HOSPADM

## 2023-01-12 RX ORDER — PROCHLORPERAZINE EDISYLATE 5 MG/ML
10 INJECTION INTRAMUSCULAR; INTRAVENOUS
Status: DISCONTINUED | OUTPATIENT
Start: 2023-01-12 | End: 2023-01-13 | Stop reason: HOSPADM

## 2023-01-12 RX ORDER — ENOXAPARIN SODIUM 100 MG/ML
40 INJECTION SUBCUTANEOUS EVERY 12 HOURS
Status: DISCONTINUED | OUTPATIENT
Start: 2023-01-13 | End: 2023-01-13 | Stop reason: HOSPADM

## 2023-01-12 RX ORDER — LIDOCAINE HYDROCHLORIDE ANHYDROUS AND DEXTROSE MONOHYDRATE .8; 5 G/100ML; G/100ML
INJECTION, SOLUTION INTRAVENOUS
Status: DISCONTINUED | OUTPATIENT
Start: 2023-01-12 | End: 2023-01-12 | Stop reason: HOSPADM

## 2023-01-12 RX ORDER — ACETAMINOPHEN 325 MG/1
650 TABLET ORAL ONCE
Status: DISCONTINUED | OUTPATIENT
Start: 2023-01-12 | End: 2023-01-12 | Stop reason: HOSPADM

## 2023-01-12 RX ORDER — SODIUM CHLORIDE 0.9 % (FLUSH) 0.9 %
5-40 SYRINGE (ML) INJECTION AS NEEDED
Status: DISCONTINUED | OUTPATIENT
Start: 2023-01-12 | End: 2023-01-12 | Stop reason: HOSPADM

## 2023-01-12 RX ORDER — LIDOCAINE HYDROCHLORIDE 20 MG/ML
INJECTION, SOLUTION EPIDURAL; INFILTRATION; INTRACAUDAL; PERINEURAL AS NEEDED
Status: DISCONTINUED | OUTPATIENT
Start: 2023-01-12 | End: 2023-01-12 | Stop reason: HOSPADM

## 2023-01-12 RX ORDER — SODIUM CHLORIDE 0.9 % (FLUSH) 0.9 %
5-40 SYRINGE (ML) INJECTION EVERY 8 HOURS
Status: DISCONTINUED | OUTPATIENT
Start: 2023-01-12 | End: 2023-01-13 | Stop reason: HOSPADM

## 2023-01-12 RX ORDER — HYDROMORPHONE HYDROCHLORIDE 2 MG/ML
INJECTION, SOLUTION INTRAMUSCULAR; INTRAVENOUS; SUBCUTANEOUS AS NEEDED
Status: DISCONTINUED | OUTPATIENT
Start: 2023-01-12 | End: 2023-01-12 | Stop reason: HOSPADM

## 2023-01-12 RX ORDER — LIDOCAINE HYDROCHLORIDE 10 MG/ML
0.1 INJECTION, SOLUTION EPIDURAL; INFILTRATION; INTRACAUDAL; PERINEURAL AS NEEDED
Status: DISCONTINUED | OUTPATIENT
Start: 2023-01-12 | End: 2023-01-12 | Stop reason: HOSPADM

## 2023-01-12 RX ORDER — MAGNESIUM SULFATE HEPTAHYDRATE 40 MG/ML
INJECTION, SOLUTION INTRAVENOUS AS NEEDED
Status: DISCONTINUED | OUTPATIENT
Start: 2023-01-12 | End: 2023-01-12 | Stop reason: HOSPADM

## 2023-01-12 RX ORDER — FLUTICASONE PROPIONATE 50 MCG
1 SPRAY, SUSPENSION (ML) NASAL DAILY
Status: DISCONTINUED | OUTPATIENT
Start: 2023-01-13 | End: 2023-01-13 | Stop reason: HOSPADM

## 2023-01-12 RX ORDER — SODIUM CHLORIDE 9 MG/ML
25 INJECTION, SOLUTION INTRAVENOUS CONTINUOUS
Status: DISCONTINUED | OUTPATIENT
Start: 2023-01-12 | End: 2023-01-12 | Stop reason: HOSPADM

## 2023-01-12 RX ORDER — MORPHINE SULFATE 2 MG/ML
2 INJECTION, SOLUTION INTRAMUSCULAR; INTRAVENOUS
Status: DISCONTINUED | OUTPATIENT
Start: 2023-01-12 | End: 2023-01-12 | Stop reason: HOSPADM

## 2023-01-12 RX ORDER — HYOSCYAMINE SULFATE 0.12 MG/1
0.12 TABLET SUBLINGUAL
Status: DISCONTINUED | OUTPATIENT
Start: 2023-01-12 | End: 2023-01-13 | Stop reason: HOSPADM

## 2023-01-12 RX ORDER — ASPIRIN 81 MG/1
81 TABLET ORAL DAILY
Status: DISCONTINUED | OUTPATIENT
Start: 2023-01-13 | End: 2023-01-13 | Stop reason: HOSPADM

## 2023-01-12 RX ORDER — FENTANYL CITRATE 50 UG/ML
50 INJECTION, SOLUTION INTRAMUSCULAR; INTRAVENOUS AS NEEDED
Status: DISCONTINUED | OUTPATIENT
Start: 2023-01-12 | End: 2023-01-12 | Stop reason: HOSPADM

## 2023-01-12 RX ORDER — DIPHENHYDRAMINE HYDROCHLORIDE 50 MG/ML
12.5 INJECTION, SOLUTION INTRAMUSCULAR; INTRAVENOUS AS NEEDED
Status: DISCONTINUED | OUTPATIENT
Start: 2023-01-12 | End: 2023-01-12 | Stop reason: HOSPADM

## 2023-01-12 RX ORDER — ROCURONIUM BROMIDE 10 MG/ML
INJECTION, SOLUTION INTRAVENOUS AS NEEDED
Status: DISCONTINUED | OUTPATIENT
Start: 2023-01-12 | End: 2023-01-12 | Stop reason: HOSPADM

## 2023-01-12 RX ORDER — KETAMINE HCL IN 0.9 % NACL 50 MG/5 ML
SYRINGE (ML) INTRAVENOUS AS NEEDED
Status: DISCONTINUED | OUTPATIENT
Start: 2023-01-12 | End: 2023-01-12 | Stop reason: HOSPADM

## 2023-01-12 RX ORDER — LORAZEPAM 1 MG/1
1 TABLET ORAL
Status: DISCONTINUED | OUTPATIENT
Start: 2023-01-12 | End: 2023-01-13 | Stop reason: HOSPADM

## 2023-01-12 RX ORDER — SODIUM CHLORIDE, SODIUM LACTATE, POTASSIUM CHLORIDE, CALCIUM CHLORIDE 600; 310; 30; 20 MG/100ML; MG/100ML; MG/100ML; MG/100ML
INJECTION, SOLUTION INTRAVENOUS
Status: DISCONTINUED | OUTPATIENT
Start: 2023-01-12 | End: 2023-01-12 | Stop reason: HOSPADM

## 2023-01-12 RX ORDER — DEXMEDETOMIDINE HYDROCHLORIDE 100 UG/ML
INJECTION, SOLUTION INTRAVENOUS AS NEEDED
Status: DISCONTINUED | OUTPATIENT
Start: 2023-01-12 | End: 2023-01-12 | Stop reason: HOSPADM

## 2023-01-12 RX ORDER — MIDAZOLAM HYDROCHLORIDE 1 MG/ML
1 INJECTION, SOLUTION INTRAMUSCULAR; INTRAVENOUS AS NEEDED
Status: DISCONTINUED | OUTPATIENT
Start: 2023-01-12 | End: 2023-01-12 | Stop reason: HOSPADM

## 2023-01-12 RX ORDER — SODIUM CHLORIDE, SODIUM LACTATE, POTASSIUM CHLORIDE, CALCIUM CHLORIDE 600; 310; 30; 20 MG/100ML; MG/100ML; MG/100ML; MG/100ML
500 INJECTION, SOLUTION INTRAVENOUS CONTINUOUS
Status: DISCONTINUED | OUTPATIENT
Start: 2023-01-12 | End: 2023-01-12

## 2023-01-12 RX ORDER — ACETAMINOPHEN 500 MG
1000 TABLET ORAL EVERY 6 HOURS
Status: DISCONTINUED | OUTPATIENT
Start: 2023-01-12 | End: 2023-01-13 | Stop reason: HOSPADM

## 2023-01-12 RX ADMIN — HYDROMORPHONE HYDROCHLORIDE 0.4 MG: 2 INJECTION, SOLUTION INTRAMUSCULAR; INTRAVENOUS; SUBCUTANEOUS at 09:51

## 2023-01-12 RX ADMIN — PROPOFOL 50 MG: 10 INJECTION, EMULSION INTRAVENOUS at 09:26

## 2023-01-12 RX ADMIN — Medication 30 MG: at 07:52

## 2023-01-12 RX ADMIN — DEXAMETHASONE SODIUM PHOSPHATE 8 MG: 4 INJECTION, SOLUTION INTRAMUSCULAR; INTRAVENOUS at 07:52

## 2023-01-12 RX ADMIN — DEXMEDETOMIDINE HYDROCHLORIDE 8 MCG: 100 INJECTION, SOLUTION, CONCENTRATE INTRAVENOUS at 08:10

## 2023-01-12 RX ADMIN — ENOXAPARIN SODIUM 40 MG: 100 INJECTION SUBCUTANEOUS at 06:24

## 2023-01-12 RX ADMIN — Medication 5 UNITS: at 07:20

## 2023-01-12 RX ADMIN — PROPOFOL 50 MG: 10 INJECTION, EMULSION INTRAVENOUS at 09:48

## 2023-01-12 RX ADMIN — DEXMEDETOMIDINE HYDROCHLORIDE 8 MCG: 100 INJECTION, SOLUTION, CONCENTRATE INTRAVENOUS at 08:42

## 2023-01-12 RX ADMIN — HYDROMORPHONE HYDROCHLORIDE 2 MG: 2 TABLET ORAL at 18:51

## 2023-01-12 RX ADMIN — Medication 10 UNITS: at 10:28

## 2023-01-12 RX ADMIN — NEOSTIGMINE METHYLSULFATE 2 MG: 1 INJECTION, SOLUTION INTRAVENOUS at 09:46

## 2023-01-12 RX ADMIN — PHENYLEPHRINE HYDROCHLORIDE 40 MCG/MIN: 10 INJECTION INTRAVENOUS at 07:45

## 2023-01-12 RX ADMIN — PROPOFOL 50 MG: 10 INJECTION, EMULSION INTRAVENOUS at 09:51

## 2023-01-12 RX ADMIN — FENTANYL CITRATE 25 MCG: 50 INJECTION, SOLUTION INTRAMUSCULAR; INTRAVENOUS at 11:22

## 2023-01-12 RX ADMIN — CEFOXITIN SODIUM 2 G: 2 POWDER, FOR SOLUTION INTRAVENOUS at 07:52

## 2023-01-12 RX ADMIN — SODIUM CHLORIDE, POTASSIUM CHLORIDE, SODIUM LACTATE AND CALCIUM CHLORIDE: 600; 310; 30; 20 INJECTION, SOLUTION INTRAVENOUS at 07:26

## 2023-01-12 RX ADMIN — MAGNESIUM SULFATE 2 G: 2 INJECTION INTRAVENOUS at 07:50

## 2023-01-12 RX ADMIN — ONDANSETRON HYDROCHLORIDE 4 MG: 2 INJECTION, SOLUTION INTRAMUSCULAR; INTRAVENOUS at 09:35

## 2023-01-12 RX ADMIN — PROPOFOL 150 MG: 10 INJECTION, EMULSION INTRAVENOUS at 07:38

## 2023-01-12 RX ADMIN — Medication 10 UNITS: at 17:27

## 2023-01-12 RX ADMIN — SODIUM CHLORIDE, POTASSIUM CHLORIDE, SODIUM LACTATE AND CALCIUM CHLORIDE 500 ML/HR: 600; 310; 30; 20 INJECTION, SOLUTION INTRAVENOUS at 06:42

## 2023-01-12 RX ADMIN — LIDOCAINE HYDROCHLORIDE 40 MG: 20 INJECTION, SOLUTION EPIDURAL; INFILTRATION; INTRACAUDAL; PERINEURAL at 07:38

## 2023-01-12 RX ADMIN — INSULIN GLARGINE 20 UNITS: 100 INJECTION, SOLUTION SUBCUTANEOUS at 17:26

## 2023-01-12 RX ADMIN — FENTANYL CITRATE 25 MCG: 50 INJECTION, SOLUTION INTRAMUSCULAR; INTRAVENOUS at 10:46

## 2023-01-12 RX ADMIN — FENTANYL CITRATE 50 MCG: 50 INJECTION, SOLUTION INTRAMUSCULAR; INTRAVENOUS at 07:52

## 2023-01-12 RX ADMIN — GABAPENTIN 200 MG: 100 CAPSULE ORAL at 17:25

## 2023-01-12 RX ADMIN — SODIUM CHLORIDE, POTASSIUM CHLORIDE, SODIUM LACTATE AND CALCIUM CHLORIDE: 600; 310; 30; 20 INJECTION, SOLUTION INTRAVENOUS at 09:48

## 2023-01-12 RX ADMIN — ACETAMINOPHEN 1000 MG: 500 TABLET, FILM COATED ORAL at 17:25

## 2023-01-12 RX ADMIN — SODIUM CHLORIDE, PRESERVATIVE FREE 10 ML: 5 INJECTION INTRAVENOUS at 21:56

## 2023-01-12 RX ADMIN — GLYCOPYRROLATE 0.2 MG: 0.2 INJECTION INTRAMUSCULAR; INTRAVENOUS at 08:22

## 2023-01-12 RX ADMIN — ROCURONIUM BROMIDE 50 MG: 10 SOLUTION INTRAVENOUS at 07:38

## 2023-01-12 RX ADMIN — FENTANYL CITRATE 50 MCG: 50 INJECTION, SOLUTION INTRAMUSCULAR; INTRAVENOUS at 07:38

## 2023-01-12 RX ADMIN — Medication 500 MG: at 06:23

## 2023-01-12 RX ADMIN — GLYCOPYRROLATE 0.4 MG: 0.2 INJECTION INTRAMUSCULAR; INTRAVENOUS at 09:46

## 2023-01-12 RX ADMIN — LIDOCAINE HYDROCHLORIDE 1.5 MG/KG/HR: 8 INJECTION, SOLUTION INTRAVENOUS at 07:45

## 2023-01-12 RX ADMIN — PROPOFOL 50 MG: 10 INJECTION, EMULSION INTRAVENOUS at 07:43

## 2023-01-12 RX ADMIN — MIDAZOLAM 2 MG: 1 INJECTION INTRAMUSCULAR; INTRAVENOUS at 07:26

## 2023-01-12 RX ADMIN — DEXMEDETOMIDINE HYDROCHLORIDE 8 MCG: 100 INJECTION, SOLUTION, CONCENTRATE INTRAVENOUS at 08:32

## 2023-01-12 RX ADMIN — DEXMEDETOMIDINE HYDROCHLORIDE 8 MCG: 100 INJECTION, SOLUTION, CONCENTRATE INTRAVENOUS at 08:02

## 2023-01-12 RX ADMIN — ROCURONIUM BROMIDE 20 MG: 10 SOLUTION INTRAVENOUS at 08:29

## 2023-01-12 RX ADMIN — Medication 10 UNITS: at 21:55

## 2023-01-12 RX ADMIN — DEXMEDETOMIDINE HYDROCHLORIDE 8 MCG: 100 INJECTION, SOLUTION, CONCENTRATE INTRAVENOUS at 08:20

## 2023-01-12 RX ADMIN — HYDROMORPHONE HYDROCHLORIDE 2 MG: 2 TABLET ORAL at 14:30

## 2023-01-12 RX ADMIN — FENTANYL CITRATE 25 MCG: 50 INJECTION, SOLUTION INTRAMUSCULAR; INTRAVENOUS at 10:37

## 2023-01-12 RX ADMIN — POTASSIUM CHLORIDE AND SODIUM CHLORIDE 150 ML/HR: 450; 150 INJECTION, SOLUTION INTRAVENOUS at 13:14

## 2023-01-12 NOTE — INTERVAL H&P NOTE
Update History & Physical    The Patient's History and Physical of 1/3/23 was reviewed with the patient and I examined the patient. There was no change. The surgical site was confirmed by the patient and me. Plan:  The risk, benefits, expected outcome, and alternative to the recommended procedure have been discussed with the patient. Patient understands and wants to proceed with the procedure.     Electronically signed by Nick Lew MD on 1/12/2023 at 6:38 AM

## 2023-01-12 NOTE — PROGRESS NOTES
01/12/23 0925   Family Communication   Family Update Message Procedure started   Delivery Origin Surgeon    Relationship to Patient Other relative    Phone Number Amadousin-Anat Josse Sons   Family/Significant Other Update Called

## 2023-01-12 NOTE — ANESTHESIA PREPROCEDURE EVALUATION
Relevant Problems   RESPIRATORY SYSTEM   (+) Obstructive sleep apnea syndrome      NEUROLOGY   (+) Depression      CARDIOVASCULAR   (+) Essential hypertension      ENDOCRINE   (+) Morbid obesity (HCC)   (+) Type 2 diabetes mellitus with diabetic neuropathy (HCC)   (+) Type 2 diabetes with nephropathy (HCC)       Anesthetic History   No history of anesthetic complications            Review of Systems / Medical History  Patient summary reviewed, nursing notes reviewed and pertinent labs reviewed    Pulmonary  Within defined limits      Sleep apnea           Neuro/Psych   Within defined limits           Cardiovascular  Within defined limits  Hypertension              Exercise tolerance: >4 METS     GI/Hepatic/Renal     GERD           Endo/Other  Within defined limits  Diabetes    Morbid obesity     Other Findings              Physical Exam    Airway  Mallampati: II  TM Distance: > 6 cm  Neck ROM: normal range of motion   Mouth opening: Normal     Cardiovascular  Regular rate and rhythm,  S1 and S2 normal,  no murmur, click, rub, or gallop             Dental  No notable dental hx       Pulmonary  Breath sounds clear to auscultation               Abdominal  GI exam deferred       Other Findings            Anesthetic Plan    ASA: 3  Anesthesia type: general          Induction: Intravenous  Anesthetic plan and risks discussed with: Patient

## 2023-01-12 NOTE — OP NOTES
OPERATIVE NOTE    Date of Procedure: 1/12/2023     Preoperative Diagnosis: MORBID OBESITY  Postoperative Diagnosis: Adhesions, MORBID OBESITY      Procedure: Procedure(s):  ROBOTIC GASTRIC BYPASS, LYSIS OF ADHESIONS, EGD (E R A S)    Surgeon: Alissa Pryor MD  Assistant(s): AURORA Cary-lysis of adhesions, entry into the abdomen, exposure for the bypass, exchange of instruments and suture, and closure of the patient. Surgical Staff: Circ-1: Makeda Abernathy RN  Circ-Intern: Katie Esparza RN  Physician Assistant: AURORA Pak  Scrub Tech-1: Bibiana Glez    Anesthesia: General   Indications: 20-year-old female with obesity and uncontrolled diabetes on multiple medications also with a history of a large ventral hernia repair and many prior abdominal surgeries presents for gastric bypass  Findings: Amount of omental adhesions to the abdominal wall. Mesh in place, lysis of adhesions for 45 minutes    Description of Operation: Alona Lozada was identified in the pre-operative holding area. Informed consent was obtained after a complete discussion of risks, benefits and alternatives to surgery were had with the patient. The patient was brought back to the operating room and placed under general endotracheal anesthesia in the supine position on the operating room table. The patient was then prepped and draped in the usual sterile fashion. A timeout was performed. Given her prior abdominal surgeries I was fearful of a midline entry. I elected to use the right lateral abdomen. Using local I anesthetized the skin. I then used a 5 mm Optiview trocar to safely enter the abdomen. We noted severe omental adhesions from the prior open cholecystectomy along with adhesions to the majority of abdominal wall from the previous hernia repair. I will place an additional 5 mm trocar in the right lower abdomen and used a laparoscopic LigaSure to take down adhesions.   This was done with a combination of energy, sharply, and bluntly. This took about 30+ minutes to perform safely. Were able to get enough entry to place our 12 mm trocar in the right mid abdomen, 8 mm trocar above the umbilicus, and 2 more 8 mm trochars in the left lateral abdomen. We then inserted the Marcela retractor in the epigastrium. We then docked the robot. Given her prior surgery wanted to ensure there was no significant adhesions between the small bowel. We elevated the drain was: Cephalad. I then performed an additional 10 minutes of adhesiolysis between the omentum, small bowel loops, sigmoid colon, and transverse colon. As able to free up the omentum for an adequate window to bring up the bowel. We ran the bowel and did not find any significant adhesions. We elected to proceed with the procedure. Initial inspection did not demonstrate a hiatal hernia. We began our dissection by taking down the Angle of His using the vessel sealer. We then counted 6 cm down from the GE junction and created a perigastric window bluntly. We then used a 60 mm blue Sureform stapler load with reinforcement to transect the stomach. Next, we used multiple reinforced blue loads to transect the lateral margin of this gastric pouch. This was done using a 40 [employer] Vaishali VISIGI to size the pouch. There was good hemostasis. There was a small doing to the left lobe of the liver inferiorly. We applied pressure with the [facility]. This appeared to make it hemostatic and was confirmed upon later removal of the [facility]. Next, we used the vessel sealer to divide the omentum down to the transverse colon. Next we identified the ligament of Treitz. While ensuring proper orientation we counted 50 centimeters. We then brought this portion up to the gastric pouch. I used an interlocked 3-0 V loc absorbable suture to line up the loop to the gastric pouch. I then once again confirmed proper orientation of the BP and Aster limb.  Next I used the robotic scissors with energy to create an enterotomy and gastrotomy. I then used two 3-0 V loc absorbable suture linked together to perform the inner layered closure of the gastroenterotomy. This was done over a VISIGI to ensure no stricture. The second arm of the linked posterior 3-0 V loc absorbable suture was then run anteriorly to complete the double layer closure. Once this was done we removed the VISIGI. We used a 60 mm reinforced white load to transect the BP limb from the loop. We then counted down on the Aster limb 125 cm. At this point we created an enterotomy with the scissors. We then created an enterotomy on the BP limb. We used a white stapler load 60 mm to anastomose the common channel to the BP limb in a side to side fashion. The common enterotomy was closed with 3-0 V loc absorbable suture in a double layered fashion. I then used a 2-0 permanent V loc to close the JJ mesenteric defect. We then placed the patient in the supine position. I moved the Aster limb medially and elevated the transverse colon to expose the Petersons defect. This was closed with a running 2-0 permanent V loc suture. Next, we inserted an EGD and performed intraoperative leak test. There was no leak. There was good hemostasis of the pouch, and were able to cannulate the Aster limb. We removed the irrigation. We ensured hemostasis and inspected all staple lines. Satisfied, we undocked the robot. I then closed the 12 mm trocar site with a 0 PDS suture passed transfascial. We removed the Marcela retractor. We watched all the trochars be removed and assured hemostasis. We released pneumoperitoneum. We tied down the PDS suture. We injected further local anesthetic. The dermis was closed with 4-0 Monocryl followed by Dermabond for the skin. At the end of the procedure all instrument, needle, and sponge counts were correct. I was present and scrubbed throughout the entirety of the case.  The patient awoke from anesthesia and was extubated without complication and sent to PACU in stable condition.       Estimated Blood Loss: 10 mL    Specimens: * No specimens in log *     Complications: None    Implants: Ethicon stapler reinforcement      Nuria De La Vega MD  Bariatric and General Surgeon  [facility]  1/12/2023

## 2023-01-12 NOTE — PERIOP NOTES
TRANSFER - OUT REPORT:    Verbal report given to Vanita(name) on Reunion  being transferred to  [id number] (unit) for routine post - op       Report consisted of patients Situation, Background, Assessment and   Recommendations(SBAR). Time Pre op antibiotic [id number]  Anesthesia Stop time: 6683    FDECAONZOMO from the following report(s) SBAR, OR Summary, Intake/Output, MAR, Accordion, and Recent Results was reviewed with the receiving nurse. Opportunity for questions and clarification was provided. Is the patient on 02? NO       L/Min        Other     Is the patient on a monitor? NO    Is the nurse transporting with the patient? NO    Surgical Waiting Area notified of patient's transfer from PACU? NO      The following personal items collected during your admission accompanied patient upon transfer:   Dental Appliance: Dental Appliances: None  Vision:    Hearing Aid:    Jewelry: Jewelry: None  Clothing: Clothing: Other (comment) (clothes and shoes to pacu)  Other Valuables:  Other Valuables: None  Valuables sent to safe:    Clothes to floor with pt

## 2023-01-13 VITALS
WEIGHT: 218.26 LBS | SYSTOLIC BLOOD PRESSURE: 122 MMHG | BODY MASS INDEX: 41.24 KG/M2 | RESPIRATION RATE: 18 BRPM | TEMPERATURE: 98.3 F | DIASTOLIC BLOOD PRESSURE: 72 MMHG | HEART RATE: 87 BPM | OXYGEN SATURATION: 96 %

## 2023-01-13 LAB
ANION GAP SERPL CALC-SCNC: 5 MMOL/L (ref 5–15)
BUN SERPL-MCNC: 21 MG/DL (ref 6–20)
BUN/CREAT SERPL: 21 (ref 12–20)
CALCIUM SERPL-MCNC: 8.9 MG/DL (ref 8.5–10.1)
CHLORIDE SERPL-SCNC: 102 MMOL/L (ref 97–108)
CO2 SERPL-SCNC: 25 MMOL/L (ref 21–32)
CREAT SERPL-MCNC: 1 MG/DL (ref 0.55–1.02)
ERYTHROCYTE [DISTWIDTH] IN BLOOD BY AUTOMATED COUNT: 13.8 % (ref 11.5–14.5)
GLUCOSE BLD STRIP.AUTO-MCNC: 241 MG/DL (ref 65–117)
GLUCOSE BLD STRIP.AUTO-MCNC: 277 MG/DL (ref 65–117)
GLUCOSE SERPL-MCNC: 310 MG/DL (ref 65–100)
HCT VFR BLD AUTO: 31.4 % (ref 35–47)
HGB BLD-MCNC: 10.1 G/DL (ref 11.5–16)
MCH RBC QN AUTO: 28.1 PG (ref 26–34)
MCHC RBC AUTO-ENTMCNC: 32.2 G/DL (ref 30–36.5)
MCV RBC AUTO: 87.2 FL (ref 80–99)
NRBC # BLD: 0 K/UL (ref 0–0.01)
NRBC BLD-RTO: 0 PER 100 WBC
PLATELET # BLD AUTO: 329 K/UL (ref 150–400)
PMV BLD AUTO: 8.7 FL (ref 8.9–12.9)
POTASSIUM SERPL-SCNC: 5 MMOL/L (ref 3.5–5.1)
RBC # BLD AUTO: 3.6 M/UL (ref 3.8–5.2)
SERVICE CMNT-IMP: ABNORMAL
SERVICE CMNT-IMP: ABNORMAL
SODIUM SERPL-SCNC: 132 MMOL/L (ref 136–145)
WBC # BLD AUTO: 10 K/UL (ref 3.6–11)

## 2023-01-13 PROCEDURE — C9113 INJ PANTOPRAZOLE SODIUM, VIA: HCPCS | Performed by: SURGERY

## 2023-01-13 PROCEDURE — 74011636637 HC RX REV CODE- 636/637: Performed by: SURGERY

## 2023-01-13 PROCEDURE — 74011250636 HC RX REV CODE- 250/636: Performed by: SURGERY

## 2023-01-13 PROCEDURE — 74011250637 HC RX REV CODE- 250/637: Performed by: SURGERY

## 2023-01-13 PROCEDURE — 74011000250 HC RX REV CODE- 250: Performed by: SURGERY

## 2023-01-13 PROCEDURE — 80048 BASIC METABOLIC PNL TOTAL CA: CPT

## 2023-01-13 PROCEDURE — 36415 COLL VENOUS BLD VENIPUNCTURE: CPT

## 2023-01-13 PROCEDURE — 74011000258 HC RX REV CODE- 258: Performed by: SURGERY

## 2023-01-13 PROCEDURE — 85027 COMPLETE CBC AUTOMATED: CPT

## 2023-01-13 PROCEDURE — 82962 GLUCOSE BLOOD TEST: CPT

## 2023-01-13 RX ORDER — INSULIN GLARGINE 100 [IU]/ML
45 INJECTION, SOLUTION SUBCUTANEOUS 2 TIMES DAILY
Status: DISCONTINUED | OUTPATIENT
Start: 2023-01-13 | End: 2023-01-13 | Stop reason: HOSPADM

## 2023-01-13 RX ORDER — LOSARTAN POTASSIUM 100 MG/1
100 TABLET ORAL DAILY
Qty: 30 TABLET | Refills: 2 | Status: SHIPPED | OUTPATIENT
Start: 2023-01-13 | End: 2023-02-12

## 2023-01-13 RX ORDER — HYDROMORPHONE HYDROCHLORIDE 2 MG/1
2 TABLET ORAL
Qty: 18 TABLET | Refills: 0 | Status: SHIPPED | OUTPATIENT
Start: 2023-01-13 | End: 2023-01-18

## 2023-01-13 RX ORDER — SODIUM CHLORIDE 9 MG/ML
150 INJECTION, SOLUTION INTRAVENOUS CONTINUOUS
Status: DISCONTINUED | OUTPATIENT
Start: 2023-01-13 | End: 2023-01-13 | Stop reason: HOSPADM

## 2023-01-13 RX ORDER — METFORMIN HYDROCHLORIDE 500 MG/1
500 TABLET ORAL 2 TIMES DAILY WITH MEALS
Status: DISCONTINUED | OUTPATIENT
Start: 2023-01-13 | End: 2023-01-13 | Stop reason: HOSPADM

## 2023-01-13 RX ADMIN — SODIUM CHLORIDE, PRESERVATIVE FREE 40 MG: 5 INJECTION INTRAVENOUS at 08:38

## 2023-01-13 RX ADMIN — Medication 7 UNITS: at 07:11

## 2023-01-13 RX ADMIN — AMLODIPINE BESYLATE 5 MG: 5 TABLET ORAL at 08:35

## 2023-01-13 RX ADMIN — ENOXAPARIN SODIUM 40 MG: 100 INJECTION SUBCUTANEOUS at 08:38

## 2023-01-13 RX ADMIN — ACETAMINOPHEN 1000 MG: 500 TABLET, FILM COATED ORAL at 06:08

## 2023-01-13 RX ADMIN — ACETAMINOPHEN 1000 MG: 500 TABLET, FILM COATED ORAL at 00:00

## 2023-01-13 RX ADMIN — ASPIRIN 81 MG: 81 TABLET, COATED ORAL at 08:36

## 2023-01-13 RX ADMIN — THIAMINE HYDROCHLORIDE 100 MG: 100 INJECTION, SOLUTION INTRAMUSCULAR; INTRAVENOUS at 08:37

## 2023-01-13 RX ADMIN — SODIUM CHLORIDE 150 ML/HR: 9 INJECTION, SOLUTION INTRAVENOUS at 06:09

## 2023-01-13 RX ADMIN — CYANOCOBALAMIN 1000 MCG: 1000 INJECTION, SOLUTION INTRAMUSCULAR; SUBCUTANEOUS at 08:37

## 2023-01-13 RX ADMIN — FLUOXETINE HYDROCHLORIDE 20 MG: 20 CAPSULE ORAL at 08:35

## 2023-01-13 RX ADMIN — INSULIN GLARGINE 45 UNITS: 100 INJECTION, SOLUTION SUBCUTANEOUS at 08:36

## 2023-01-13 RX ADMIN — FLUTICASONE PROPIONATE 1 SPRAY: 50 SPRAY, METERED NASAL at 08:37

## 2023-01-13 RX ADMIN — Medication 4 UNITS: at 12:38

## 2023-01-13 RX ADMIN — GABAPENTIN 200 MG: 100 CAPSULE ORAL at 08:36

## 2023-01-13 RX ADMIN — METFORMIN HYDROCHLORIDE 500 MG: 500 TABLET ORAL at 07:11

## 2023-01-13 RX ADMIN — HYDROMORPHONE HYDROCHLORIDE 2 MG: 2 TABLET ORAL at 07:12

## 2023-01-13 RX ADMIN — ACETAMINOPHEN 1000 MG: 500 TABLET, FILM COATED ORAL at 12:38

## 2023-01-13 NOTE — PROGRESS NOTES
Surgery Progress Note    1/13/2023    Admit Date: 1/12/2023    CC: Abd pain    POD: 1 bypass    Subjective:     Drinking well. Pain controlled. No nausea. Constitutional: No fever or chills  Neurologic: No headache  Eyes: No scleral icterus or irritated eyes  Nose: No nasal pain or drainage  Mouth: No oral lesions or sore throat  Cardiac: No palpations or chest pain  Pulmonary: No cough or shortness of breath  Gastrointestinal: Abd pain, No nausea, emesis, diarrhea, or constipation  Genitourinary: No dysuria  Musculoskeletal: No muscle or joint tenderness  Skin: No rashes or lesions  Psychiatric: No anxiety or depressed mood    Objective:   Visit Vitals  /79   Pulse 91   Temp 98.4 °F (36.9 °C)   Resp 18   Wt 218 lb 4.1 oz (99 kg)   SpO2 94%   BMI 41.24 kg/m²       General: No acute distress, conversant  Eyes: PERRLA, no scleral icterus  HENT: Normocephalic without oral lesions  Neck: Trachea midline without LAD  Cardiac: Normal pulse rate and rhythm  Pulmonary: Symmetric chest rise with normal effort  GI: Soft, ATTP, wounds cdi  Skin: Warm without rash  Extremities: No edema or joint stiffness  Psych: Appropriate mood and affect    Labs, vital signs, and I/O reviewed. Assessment:     58 y/o F doing well after bypass    Plan:     PRN pain control  IVF  Oli fulls.  PPI  Increase glucose control  Lovenox  Ambulate  Home later today likely    Rajat Dutton MD  Bariatric and General Surgeon  [facility]

## 2023-01-13 NOTE — ANESTHESIA POSTPROCEDURE EVALUATION
Procedure(s):  ROBOTIC GASTRIC BYPASS, LYSIS OF ADHESIONS, EGD (E R A S). general    Anesthesia Post Evaluation        Patient participation: complete - patient participated  Level of consciousness: awake  Pain management: adequate  Airway patency: patent  Anesthetic complications: no  Cardiovascular status: hemodynamically stable  Respiratory status: acceptable  Hydration status: acceptable  Comments: The patient is ready for PACU discharge. Valeri Jones DO                   Post anesthesia nausea and vomiting:  controlled      INITIAL Post-op Vital signs:   Vitals Value Taken Time   /66 01/12/23 1315   Temp 36.8 °C (98.2 °F) 01/12/23 1115   Pulse 89 01/12/23 1316   Resp 16 01/12/23 1316   SpO2 93 % 01/12/23 1316   Vitals shown include unvalidated device data.

## 2023-01-13 NOTE — CONSULTS
Nutrition Education    Educated on Bariatric post-op diet. Learners: Patient  Readiness: Eager  Method: Explanation  Response: Verbalizes Understanding  Contact name and number provided.     Meghan Carty RD  Contact via [employer]

## 2023-01-13 NOTE — DISCHARGE INSTRUCTIONS
[employer] [facility] at [facility]  Bariatric Surgery Discharge Instructions     Procedure bypass    Future Appointments   Date Time Provider Gary Kingstoni   1/26/2023  8:00 AM ANTONI Vu BS AMB   2/9/2023  8:00 AM ANTONI Vu BS AMB   2/16/2023  2:30 PM MD CAROLANN Peralta AMB   3/1/2023  1:00 PM Yamilka Gallardo MD [facility] BS AMB         Contact Information:    [employer] [facility] at [facility], [address], [address]  [phone]    After Hours and Weekends  [phone] On Call Surgeon    Non Emergent Medical Needs  Call during office hours or send a message via My Chart   (messages returned during business hours)    DIET    Please remember that you are on Quadra Quadra [phone] for the first 2 weeks after surgery. Do not advance to the next phase until advised by your surgeon or Nurse Practitioner. Refer to the Bariatric Handbook for detailed information. TO PREVENT DEHYDRATION:  consume 64 ounces of liquids daily. At least 64 ounces of that should come from water, Crystal Light, sugar free popsicles, sugar free gelatin or other calorie-free, sugar-free, caffeine free and noncarbonated beverages. Do not drink with a straw. Sip, sip, sip throughout the day  Main priority is to stay hydrated  Aim for 60 grams of protein every day. Most of your protein will come from shakes. Refer to the Bariatric Handbook for detailed information.   Add additional protein supplements to meet protein needs (protein powder, clear protein such as protein water, non-fat dry milk powder, NO protein bars at this at this stage)     MEDICATIONS & VITAMINS    Pre-surgery medications should be reviewed with your Bariatric provider and taken as prescribed   Take no more than 2 pills at a time and wait 15-20 minutes between pills       Pain Medication  The first few days home, you may require narcotic pain medication to manage your pain. Take this medication only as prescribed. If your pain is mild to moderate, try taking Acetaminophen (Tylenol) 500 mg 1-2 tablets every 8 hours or as directed by your provider. Avoid taking antiinflammatory medications (NSAID'S) such as Ibuprofen (Motrin, Advil) or Naproxen (Aleve). These medications can be harmful to your stomach and cause bleeding and ulcers. There is a complete list of NSAID medications to AVOID in your handbook. Abdominal support (Spanx or body shaper) and heat (heating pad on low setting) are very helpful in managing pain after surgery. Acid Reducing (\"heartburn/reflux\") Medication   Acid reducing medicine should have been prescribed at your pre-surgery visit. It is recommended you take this medication every day even if you have no symptoms of reflux or heartburn. If you were previously on a medication for reflux/heartburn you should continue the medication daily. *It is common to experience reflux or heartburn after bariatric surgery. These symptoms can usually be managed with medication, diet and behavior changes. In most cases symptoms improve or resolve after a few weeks to a couple of months. Nausea Medication  You should have been prescribed medication for nausea at your pre-surgery visit. If you are experiencing nausea, please take the medication as prescribed to try and get relief. If the nausea medication is not effective, please call your surgeon's office. Constipation   Constipation can be caused by pain medication and reduced food and water intake. Drink at least 64 oz. fluid. OK to use OTC medications such as Milk of Magnesia or Miralax      Vitamins      Okay to start immediately when returning home. Calcium Citrate with Vitamin D-3 - Take 1200--1500 mg  each day. Divide doses throughout the day. Do not take more than 600 mg at one time.    Take at least 2 hours before or after your multivitamin and/or iron supplement. Multivitamin containing Iron - 2 multivitamins with 100% Daily Value of Iron, Folic Acid and Thiamine   Vitamin D-3 - Take 3000 IU  per day  Vitamin B-12 - Oral or Sublingual: 350-500 mcg/day OR 1000 mcg Monthly intramuscular shot        ACTIVITY    Be active. Sit up as much as possible. Walk often. Walking and/or foot exercises will help prevent blood clots. Continue to sip liquids throughout the day  Continue to use your incentive spirometer 4 to 5 times per day  Continue using your CPAP if previous prescribed. Keep your incisions clean and dry to prevent infection. Showering is ok. No submersion in water for 2 weeks (No tubs, pools, etc.)  Weight lifting restrictions:  10 lbs. for the first 2 weeks, 20 lbs. for the next 4 to 6 weeks    TOP REASONS TO CONTACT YOUR SURGEONS'S OFFICE    You have severe pain or discomfort unrelieved by pain medication. You have been vomiting for more than 24 hours. Call sooner if you are unable to drink any fluids. Temperature rises above 101.5 degrees. You have persistent nausea and/or vomiting. You are unable to swallow liquids   Increased swelling, redness, or drainage from your incision sites.

## 2023-01-16 ENCOUNTER — TELEPHONE (OUTPATIENT)
Dept: SURGERY | Age: 63
End: 2023-01-16

## 2023-01-16 NOTE — TELEPHONE ENCOUNTER
Bariatric Post Op Call 48 hour    Hydration: Less than 32 ounces of water daily is fair to poor (Goal is 64 ounces per day)  Poor _____ Fair _____ Good __X___ Maru Honey _____    Amount: 48 ounces    Comment: I did express the importance of consuming 64 ounces daily to avoid dehydration. Ambulation:( walking throughout the day, at least every 2 hours while awake. Patient should be up and out of bed most of the day.)   Poor _____ Fair _____ Good _____ Maru Honey __X___    Comment:    Urine Color: Question of any odor and color (should be clay, pale, and clear)   Dark ____ [employer] _____ Pale __X___ Clear _____     Comment:  No odor    Diet: Question any nausea and/or vomiting. Protein intake (ultimately goal is 60 grams of protein daily, but at 2 days post op they should be working towards this and may not be at goal yet)  Poor _____ Fair _____ Good _X____ Maru Honey _____    Comment: No nausea and or vomiting. Patient reported 40 grams so far. Bowel movements: Question of any constipation- haven't had any bowel movements for more than 3 days. This could be related to protein intake and/or narcotic pain medication usage. Comment: No BM, to start Miralax today. I recommended she take it twice a day starting tomorrow and if no BM by Wednesday you can add a dose of MOM. Use of incentive spirometer:  Yes __X__ No _____    Comment: Reminded her to use it once every hour. Incision: (No redness, pain, swelling or fever)     Healing Well __x___ Redness ___No__ Pain_No___ Drainage __No___ Swelling _No___     Comment: No fever    Pain: Right sided incisional (usually the largest incision with deep stitch) abdominal pain is normal (should be less than 3). Pain 0 - 10: 3    Comment (are they taking pain medication and is it helping? Abdominal support / splinting/ ice or heat?):     I did let her know she can do heat or ice with the pain. I told her she can take tylenol in between the pain medication.  I also told her to splint the abdomen with a pillow when changing positions. Referred to provider: No     Next appointment: 1/26/23      Red Flags = prompt referral to a bariatric team provider for follow up    Fever > 101  Vomiting and not tolerating liquids   Weak and dizzy / lightheaded   Dark urine   Abdominal pain despite medication, splinting, ice or heat   SOB  Calf swelling and or redness   Chest pain   Additional Comments: ____________________________________________________________    If more than one parameter is not met or considered poor, nurse needs to discuss with provider recommend for patient to be seen in the office as soon as possible or refer to the provider for follow-up. Reinforce to patient to use bariatric educational booklet as guide. It is appropriate to refer patient to the nutritionist to discuss more in detail of diet and nutrition.

## 2023-01-26 ENCOUNTER — OFFICE VISIT (OUTPATIENT)
Dept: SURGERY | Age: 63
End: 2023-01-26
Payer: MEDICAID

## 2023-01-26 VITALS
DIASTOLIC BLOOD PRESSURE: 74 MMHG | WEIGHT: 202.5 LBS | HEIGHT: 61 IN | RESPIRATION RATE: 20 BRPM | OXYGEN SATURATION: 96 % | BODY MASS INDEX: 38.23 KG/M2 | HEART RATE: 86 BPM | SYSTOLIC BLOOD PRESSURE: 113 MMHG | TEMPERATURE: 98.3 F

## 2023-01-26 DIAGNOSIS — E66.01 MORBID OBESITY (HCC): Primary | ICD-10-CM

## 2023-01-26 DIAGNOSIS — Z09 SURGICAL FOLLOWUP: ICD-10-CM

## 2023-01-26 PROCEDURE — 99024 POSTOP FOLLOW-UP VISIT: CPT | Performed by: NURSE PRACTITIONER

## 2023-01-26 RX ORDER — INSULIN GLARGINE 300 U/ML
INJECTION, SOLUTION SUBCUTANEOUS
COMMUNITY
Start: 2023-01-19

## 2023-01-26 RX ORDER — MULTIVIT-MIN/IRON/FOLIC ACID/K 45-800-120
CAPSULE ORAL
COMMUNITY

## 2023-01-26 NOTE — PATIENT INSTRUCTIONS
What you need to know:  1. Advance your diet to soft foods. Follow the handout that you were given today in the office. 2.  Take the recommended vitamins daily  3. No lifting greater than 20 lbs. 4.  You can do light jogging and walking. 5  Follow up in 2 weeks. 6.  You may go into a pool. 7.  If you are not able to tolerate liquids or soft foods. Please call our office. [phone]  8. If you have vomiting and persistent epigastric pain or chest pain. You should call our office, the doctor on-call or go to the emergency room. Constipation  Benefiber, Miralax & similar (once or twice daily)  Milk of Magnesia (daily as needed)  Dulcolax suppository  Fleets Enema    Soft and Mushy   What is this diet? Introduces soft, easy to digest foods  Low fat, no sugar added    When do I begin? Once instructed by your surgeon or NP. Usually 2 -3 weeks after surgery      You will stay on this diet until instructed to start the next phase. What foods can I eat? Moist, mushy foods (see approved list of foods)       Key Points  Continue to drink 48-64 ounces of low calorie, non-carbonated, sugar free beverages between meals. Eat 3 meals per day  Measure each meal to ?cup per meal  Aim for 60 grams of protein every day. Try food sources of protein first.   Continue to supplement with protein shakes/powder to meet protein goals. Take small bites. Try eating with smaller utensils (baby spoon, cocktail fork). Chew food thoroughly  Allow about 30 minutes to eat a meal.  Eating too fast may cause nausea or vomiting. Stop eating as soon as you feel full. Overeating may stretch your stomach's capacity and prevent desired weight loss. Do not drink liquids during meals and 30 minutes after meals. Drinking with meals may cause nausea or vomiting.   Add one new food at a time  Take vitamins daily                     Shopping Lists    Soft and Mushy  In addition to everything on the Bariatric Liquid diet, you may add these foods to your diet. Protein - include with every meal  Egg or egg substitute    Low fat or fat-free cottage cheese    Low fat or fat-free yogurt   Low fat Greek yogurt   Fat-free, 1% milk, or Lactaid milk   Low-fat or vegetarian refried beans   Well-cooked beans and lentils  Fat-free or 2% reduced-fat cheese   Hummus   Low fat soup     Snacks/Other Options:  Whole wheat crackers  Sugar free fudgsicles   Sugar free cocoa   No sugar added pudding         Fruits and Vegetables  Applesauce (no sugar added)  Canned fruit (no sugar added)  Fresh soft peeled fruits (melons, banana, avocado, berries)  Any soft cooked vegetables   Mashed potatoes, Sweet potatoes, baked potatoes (no skin)  Condiments  Fat free non-stick spray  Herbs and spices  Lite butter, margarine, canola oil, olive oil  Reduced-fat or fat-free holt  Reduced-fat or fat-free salad dressing  Reduced-fat or fat-free cream cheese  Reduced-fat or fat-free sour cream  Lemon juice  Salt, pepper, mustard, ketchup, salsa    Prepare food to the appropriate texture. Sample Meal Plan:  Soft and Mushy    Breakfast ½ cup plain oatmeal with protein powder. Add cinnamon, nutmeg, Splenda brown sugar as desired for flavor 20-25 grams protein   Snack (optional) High protein gelatin (recipe on www.unjury. com) 10 grams protein   Lunch ½ cup low fat cottage cheese or [place] yogurt with soft fruit 10 - 15 grams protein    Snack (optional) High protein pudding or high protein popsicle (recipe on www.unjury. com) 10 grams protein   Dinner ¼ cup low-fat well cooked beans with low-fat cheese sprinkled on top  ¼ cup no sugar added applesauce (can sprinkle protein powder) 5-8 grams protein  5-10  grams protein

## 2023-01-26 NOTE — PROGRESS NOTES
2 weeks status post gastric bypass. Pt reports doing well on liquids . No complaints of pain. Pt reports no nausea and no vomiting  Sheis drinking approximately 56 oz of water daily  + BM, using mirlax, having diarrhea. She is drinking and eating 60 grams of protein daily. She is on Toujau to adjust her blood sugars. Running 199-205. Seeing Endo. She is taking bariatric vitamins without issue. Total weight loss since surgery 12.5lbs  Weight loss since last visit 12.5lbs  Visit Vitals  /74 (BP 1 Location: Left upper arm, BP Patient Position: Sitting, BP Cuff Size: Large adult)   Pulse 86   Temp 98.3 °F (36.8 °C)   Resp 20   Ht 5' 1\" (1.549 m)   Wt 202 lb 8 oz (91.9 kg)   LMP 06/01/1998   SpO2 96%   BMI 38.26 kg/m²              Ms. Angelique Davila has a reminder for a \"due or due soon\" health maintenance. I have asked that she contact her primary care provider for follow-up on this health maintenance. Physical Examination: General appearance - alert, well appearing, and in no distress,  Chest - clear to auscultation bilaterally  Heart - normal rate, regular rhythm, normal S1, S2, no murmurs, rubs, clicks or gallops  Abdomen - soft, nontender, nondistended  scars from previous incisions healing without erythema or induration    A/P    Doing well 2 weeks status post laparoscopic Gastric Bypass  Diet advanced to soft foods. Focus on 50-60 grams of protein daily. Encouraged water intake to 64 oz of non-carbonated/no calorie beverages daily. Supplement with unflavored protein powder daily. Continue PPI  No lifting greater than 20 lbs. Follow up in 2 weeks. Pt verbalized understanding and questions were answered to the best of my knowledge and ability. diet educational materials were provided.       Patricia Jeter NP

## 2023-01-26 NOTE — PROGRESS NOTES
Identified pt with two pt identifiers (name and ). Reviewed chart in preparation for visit and have obtained necessary documentation. Libra Carrillo is a 57 y.o. female  Chief Complaint   Patient presents with    Surgical Follow-up     2 weeks s/p lap gastric bypass down 12.5 pounds      Visit Vitals  /74 (BP 1 Location: Left upper arm, BP Patient Position: Sitting, BP Cuff Size: Large adult)   Pulse 86   Temp 98.3 °F (36.8 °C)   Resp 20   Ht 5' 1\" (1.549 m)   Wt 202 lb 8 oz (91.9 kg)   LMP 1998   SpO2 96%   BMI 38.26 kg/m²       1. Have you been to the ER, urgent care clinic since your last visit? Hospitalized since your last visit? No    2. Have you seen or consulted any other health care providers outside of the [address] [place] [address] since your last visit? Include any pap smears or colon screening.  No

## 2023-02-03 ENCOUNTER — TELEPHONE (OUTPATIENT)
Dept: SURGERY | Age: 63
End: 2023-02-03

## 2023-02-03 NOTE — TELEPHONE ENCOUNTER
Bariatric Post Op Call: Week 3     Hydration: Less than 32 ounces of water daily is fair to poor (Goal is 64 ounces per day)  Poor _____ Fair _____ Good _____ Raudel Muzzy __X___    Amount: 64 ounces    Comment:    Ambulation: walking at least 3x/ week for 30 minutes   Poor _____ Pheobe Lapidus _____ Chetna Dell _____ Raudel Muzzy __X___    Comment:    Urine Color: Question of any odor and color (should be clay, pale, and clear)   Dark ____ Mayme Cellar _____ Pale _____ Clear __X___     Comment: No odor. Diet: Question any nausea and/or vomiting. Protein intake (ultimately goal is 60 grams of protein daily and at this stage they should be meeting goal). Poor _____ Fair _____ Good _____ Great _X____    Comment: Consuming anywhere from 50 to 55 grams daily. No nausea and or vomiting. Bowel movements: Question of any constipation- haven't had any bowel movements for more than 3 days. This could be related to protein, fluid intake, medications and activity. Comment: Regular BM's noted    Incision: (No redness, pain, swelling or fever)     Healing Well _X____ Redness _None____ Pain__No___ Drainage __None___ Swelling _No____     Comment: No Fever    Pain: Right sided incisional (usually the largest incision with deep stitch) abdominal pain is normal (should be less than 3). This should be better by 3 weeks post op. Pain 0 - 10: 0    Comment (are they taking pain medication and is it helping? Abdominal support / splinting/ ice or heat?): I did remind her she can continue to splint the abdomen and she can take tylenol as needed for pain.       Referred to provider: No  Next Appointment:  2/9/23   Support Group: 2nd Thursday every month from 6-7 pm on Zoom     Red Flags = prompt referral to a bariatric team provider for follow up    Fever > 101  Vomiting and not tolerating liquids   Weak and dizzy / lightheaded   Dark urine   Abdominal pain despite medication, splinting, ice or heat   SOB  Calf swelling and or redness   Chest pain ____________________________________________________________    If more than one parameter is not met or considered poor, nurse needs to discuss with provider recommend for patient to be seen in the office as soon as possible or refer to the provider for follow-up. Reinforce to patient to use bariatric educational booklet as guide. It is appropriate to refer patient to the nutritionist to discuss more in detail of diet and nutrition.

## 2023-02-09 ENCOUNTER — VIRTUAL VISIT (OUTPATIENT)
Dept: SURGERY | Age: 63
End: 2023-02-09
Payer: MEDICAID

## 2023-02-09 VITALS — HEIGHT: 61 IN | WEIGHT: 194.4 LBS | BODY MASS INDEX: 36.7 KG/M2

## 2023-02-09 DIAGNOSIS — Z09 SURGICAL FOLLOWUP: ICD-10-CM

## 2023-02-09 DIAGNOSIS — E66.01 MORBID OBESITY (HCC): Primary | ICD-10-CM

## 2023-02-09 PROCEDURE — 99024 POSTOP FOLLOW-UP VISIT: CPT | Performed by: NURSE PRACTITIONER

## 2023-02-09 RX ORDER — DOCUSATE SODIUM 100 MG/1
100 CAPSULE, LIQUID FILLED ORAL EVERY OTHER DAY
COMMUNITY

## 2023-02-09 NOTE — PROGRESS NOTES
Identified pt with two pt identifiers (name and ). Reviewed chart in preparation for visit and have obtained necessary documentation. Errol Machado is a 57 y.o. female  Chief Complaint   Patient presents with    Surgical Follow-up     4 wks s/p robotic gastric bypass with lysis of adhesions     Visit Vitals  Ht 5' 1\" (1.549 m)   Wt 194 lb 6.4 oz (88.2 kg)   LMP 1998   BMI 36.73 kg/m²     Current wt 194.4 lbs  Previous wt 202 lbs  Wt loss of 7.6 and TWL 20.1 since surgery    1. Have you been to the ER, urgent care clinic since your last visit? Hospitalized since your last visit? No    2. Have you seen or consulted any other health care providers outside of the [address] [place] [address] since your last visit? Include any pap smears or colon screening.  No

## 2023-02-09 NOTE — PROGRESS NOTES
4 weeks status post gastric bypass. Pt reports doing well on liquids and soft foods. .    Patient no complaints of pain. Pt reports no nausea and no vomiting. She vomited once when she ate too fast.   Sheis drinking approximately 64 oz of water daily    She is drinking and eating 50-60 grams of protein daily. She is taking bariatric vitamins without issue.   +Bm, taking colace every other day. Still constipated. Blood sugars running somewhat normal. She is still on tuojeo. Total weight loss since surgery 20.1lbs  Weight loss since last visit 7.6lbs  Visit Vitals  Ht 5' 1\" (1.549 m)   Wt 194 lb 6.4 oz (88.2 kg)   LMP 06/01/1998   BMI 36.73 kg/m²              Ms. Allan Gill has a reminder for a \"due or due soon\" health maintenance. I have asked that she contact her primary care provider for follow-up on this health maintenance. Physical Examination: General appearance - alert, well appearing, and in no distress,  Chest - no respiratory distress. Abdomen -incisions healing    A/P    Doing well 4 weeks status post laparoscopic Gastric Bypass  Diet advanced to soft meats. Focus on 50-60 grams of protein daily. Encouraged water intake to 64 oz of non-carbonated/no calorie beverages daily. Supplement with unflavored protein powder daily. Continue PPI  No lifting greater than 40 lbs. Follow up in 2 weeks. Pt verbalized understanding and questions were answered to the best of my knowledge and ability. Hans Figueroa, was evaluated through a synchronous (real-time) audio-video encounter. The patient (or guardian if applicable) is aware that this is a billable service, which includes applicable co-pays. This Virtual Visit was conducted with patient's (and/or legal guardian's) consent.  The visit was conducted pursuant to the emergency declaration under the [facility], 1135 waiver authority and the [employer] and [employer] Appropriations Act. Patient identification was verified, and a caregiver was present when appropriate. The patient was located at: Home: [address] [place] [id number]  The provider was located at: Facility (Appt Department): Aneudy Rajan RD  [facility] [address] [address]      --Rachel Coppola NP on 2/9/2023 at 9:19 AM    An electronic signature was used to authenticate this note.    Rachel Coppola NP

## 2023-02-12 DIAGNOSIS — I10 ESSENTIAL HYPERTENSION: ICD-10-CM

## 2023-02-14 RX ORDER — AMLODIPINE BESYLATE 5 MG/1
TABLET ORAL
Qty: 90 TABLET | Refills: 0 | Status: SHIPPED | OUTPATIENT
Start: 2023-02-14

## 2023-02-14 RX ORDER — FLUOXETINE HYDROCHLORIDE 20 MG/1
CAPSULE ORAL
Qty: 90 CAPSULE | Refills: 1 | Status: SHIPPED | OUTPATIENT
Start: 2023-02-14

## 2023-02-16 ENCOUNTER — OFFICE VISIT (OUTPATIENT)
Dept: INTERNAL MEDICINE CLINIC | Age: 63
End: 2023-02-16
Payer: MEDICAID

## 2023-02-16 VITALS
WEIGHT: 194.8 LBS | OXYGEN SATURATION: 96 % | HEIGHT: 61 IN | BODY MASS INDEX: 36.78 KG/M2 | RESPIRATION RATE: 16 BRPM | DIASTOLIC BLOOD PRESSURE: 66 MMHG | SYSTOLIC BLOOD PRESSURE: 128 MMHG | HEART RATE: 89 BPM | TEMPERATURE: 98.1 F

## 2023-02-16 DIAGNOSIS — I10 ESSENTIAL HYPERTENSION: ICD-10-CM

## 2023-02-16 DIAGNOSIS — E11.29 TYPE 2 DIABETES MELLITUS WITH MICROALBUMINURIA, WITH LONG-TERM CURRENT USE OF INSULIN (HCC): Primary | ICD-10-CM

## 2023-02-16 DIAGNOSIS — E78.5 HYPERLIPIDEMIA LDL GOAL <100: ICD-10-CM

## 2023-02-16 DIAGNOSIS — F32.5 MAJOR DEPRESSIVE DISORDER WITH SINGLE EPISODE, IN FULL REMISSION (HCC): ICD-10-CM

## 2023-02-16 DIAGNOSIS — Z79.4 TYPE 2 DIABETES MELLITUS WITH MICROALBUMINURIA, WITH LONG-TERM CURRENT USE OF INSULIN (HCC): Primary | ICD-10-CM

## 2023-02-16 DIAGNOSIS — D64.9 ANEMIA, UNSPECIFIED TYPE: ICD-10-CM

## 2023-02-16 DIAGNOSIS — G47.33 OBSTRUCTIVE SLEEP APNEA SYNDROME: ICD-10-CM

## 2023-02-16 DIAGNOSIS — E66.01 CLASS 2 SEVERE OBESITY DUE TO EXCESS CALORIES WITH SERIOUS COMORBIDITY AND BODY MASS INDEX (BMI) OF 36.0 TO 36.9 IN ADULT (HCC): ICD-10-CM

## 2023-02-16 DIAGNOSIS — Z09 HOSPITAL DISCHARGE FOLLOW-UP: ICD-10-CM

## 2023-02-16 DIAGNOSIS — R80.9 TYPE 2 DIABETES MELLITUS WITH MICROALBUMINURIA, WITH LONG-TERM CURRENT USE OF INSULIN (HCC): Primary | ICD-10-CM

## 2023-02-16 PROBLEM — Z87.19 HISTORY OF INCISIONAL HERNIA REPAIR: Status: RESOLVED | Noted: 2022-05-18 | Resolved: 2023-02-16

## 2023-02-16 PROBLEM — E11.21 TYPE 2 DIABETES WITH NEPHROPATHY (HCC): Status: RESOLVED | Noted: 2018-04-27 | Resolved: 2023-02-16

## 2023-02-16 PROBLEM — Z98.890 HISTORY OF INCISIONAL HERNIA REPAIR: Status: RESOLVED | Noted: 2022-05-18 | Resolved: 2023-02-16

## 2023-02-16 PROBLEM — E11.40 TYPE 2 DIABETES MELLITUS WITH DIABETIC NEUROPATHY (HCC): Status: RESOLVED | Noted: 2018-04-27 | Resolved: 2023-02-16

## 2023-02-16 PROCEDURE — 99214 OFFICE O/P EST MOD 30 MIN: CPT | Performed by: INTERNAL MEDICINE

## 2023-02-16 PROCEDURE — 1111F DSCHRG MED/CURRENT MED MERGE: CPT | Performed by: INTERNAL MEDICINE

## 2023-02-16 RX ORDER — ROSUVASTATIN CALCIUM 20 MG/1
TABLET, COATED ORAL
Qty: 90 TABLET | Refills: 1 | Status: SHIPPED | OUTPATIENT
Start: 2023-02-16

## 2023-02-16 RX ORDER — LOSARTAN POTASSIUM 100 MG/1
100 TABLET ORAL DAILY
COMMUNITY
Start: 2023-02-12

## 2023-02-16 NOTE — PROGRESS NOTES
Gato Rangel is a 57 y.o. female who was seen today for a follow-up visit. Assessment & Plan:   1. Type 2 diabetes mellitus with microalbuminuria, with long-term current use of insulin (formerly [facility])  Assessment & Plan:   reviewed last a1c at goal. continue insulin as per endocrine and will need to monitor insulin requirements as she is continuing to lose wt. Orders:  -     METABOLIC PANEL, COMPREHENSIVE; Future  2. Essential hypertension  Assessment & Plan:   well controlled, continue current medications  3. Hyperlipidemia LDL goal <100  Assessment & Plan:   well controlled, continue current medications  4. Major depressive disorder with single episode, in full remission [facility])  Assessment & Plan:   well controlled, continue current medications  5. Class 2 severe obesity due to excess calories with serious comorbidity and body mass index (BMI) of 36.0 to 36.9 in adult [facility])  Assessment & Plan:   S/p gastric bypass with no complications and has lost 24 pounds. 6. Anemia, unspecified type  -     CBC WITH AUTOMATED DIFF; Future  7. Hospital discharge follow-up  Comments:  s/p gastric bypass and doing well with wt loss. Orders:  -     IN DISCHRG MEDS RECONCILED W/CURRENT MED LIST  8. Obstructive sleep apnea syndrome  Assessment & Plan:   monitored by specialist.  Recommend follow up to reevaluate after she looses more wt. Continue with CPAP. follow up 6 months. Advised her to call back or return to office if symptoms worsen/change/persist.       Subjective: Gato Rangel \"Aj\" was seen for follow up chronic medical problems and medication management. Taking medication with no side effects. Exercise: walking Diet low sugar low carb diet. BS running 200 off of insulin so just restarting tujeo. Gastric bypass 5 weeks ago and has lost 24 pounds already. She has done very well post op.   Patient Active Problem List   Diagnosis Code    Obstructive sleep apnea syndrome G47.33    Class 2 severe obesity due to excess calories with serious comorbidity and body mass index (BMI) of 36.0 to 36.9 in adult ([facility]) E66.01, Z68.36    History of carcinoma in situ of breast Z86.000    Osteopenia M85.80    Essential hypertension I10    Depression F32. A    Hyperlipidemia LDL goal <100 E78.5    Type 2 diabetes mellitus with microalbuminuria, with long-term current use of insulin ([facility]) E11.29, R80.9, Z79.4    H/O bilateral mastectomy Z90.13       Review of Systems   Respiratory:  Negative for shortness of breath. Cardiovascular:  Negative for chest pain and leg swelling. Gastrointestinal:  Negative for abdominal pain and heartburn. Psychiatric/Behavioral:  Negative for depression.   - per HPI    Physical Exam  Vitals and nursing note reviewed. Constitutional:       General: She is not in acute distress. Appearance: She is well-developed. HENT:      Head: Normocephalic and atraumatic. Cardiovascular:      Rate and Rhythm: Normal rate and regular rhythm. Pulmonary:      Effort: Pulmonary effort is normal.      Breath sounds: Normal breath sounds. No wheezing. Abdominal:      General: Bowel sounds are normal. There is no distension. Palpations: Abdomen is soft. There is no mass. Tenderness: There is no abdominal tenderness. Musculoskeletal:      Right lower leg: No edema. Left lower leg: No edema. Psychiatric:         Mood and Affect: Mood normal.   Foot exam: Monofilament intact bilaterally. Pulses 2+ bilaterally. No open wounds. Visit Vitals  /83   Pulse 89   Temp 98.1 °F (36.7 °C) (Temporal)   Resp 16   Ht 5' 1\" (1.549 m)   Wt 194 lb 12.8 oz (88.4 kg)   LMP 06/01/1998   SpO2 96%   BMI 36.81 kg/m²      Aspects of this note may have been generated using voice recognition software. Despite editing, there may be some syntax errors   I have discussed the diagnosis with the patient and the intended plan as seen in the above orders.   The patient has received an after-visit summary and questions were answered concerning future plans. I have discussed any recommended medication side effects and warnings with the patient as well.   She has expressed understanding of the diagnosis and plan    Hong Tafoya MD

## 2023-02-17 LAB
ALBUMIN SERPL-MCNC: 4.5 G/DL (ref 3.8–4.8)
ALBUMIN/GLOB SERPL: 2.3 {RATIO} (ref 1.2–2.2)
ALP SERPL-CCNC: 123 IU/L (ref 44–121)
ALT SERPL-CCNC: 21 IU/L (ref 0–32)
AST SERPL-CCNC: 20 IU/L (ref 0–40)
BASOPHILS # BLD AUTO: 0.1 X10E3/UL (ref 0–0.2)
BASOPHILS NFR BLD AUTO: 1 %
BILIRUB SERPL-MCNC: 0.3 MG/DL (ref 0–1.2)
BUN SERPL-MCNC: 17 MG/DL (ref 8–27)
BUN/CREAT SERPL: 24 (ref 12–28)
CALCIUM SERPL-MCNC: 10.2 MG/DL (ref 8.7–10.3)
CHLORIDE SERPL-SCNC: 97 MMOL/L (ref 96–106)
CO2 SERPL-SCNC: 23 MMOL/L (ref 20–29)
CREAT SERPL-MCNC: 0.7 MG/DL (ref 0.57–1)
EGFRCR SERPLBLD CKD-EPI 2021: 97 ML/MIN/1.73
EOSINOPHIL # BLD AUTO: 0.2 X10E3/UL (ref 0–0.4)
EOSINOPHIL NFR BLD AUTO: 2 %
ERYTHROCYTE [DISTWIDTH] IN BLOOD BY AUTOMATED COUNT: 14.1 % (ref 11.7–15.4)
GLOBULIN SER CALC-MCNC: 2 G/DL (ref 1.5–4.5)
GLUCOSE SERPL-MCNC: 195 MG/DL (ref 70–99)
HCT VFR BLD AUTO: 36.7 % (ref 34–46.6)
HGB BLD-MCNC: 11.9 G/DL (ref 11.1–15.9)
IMM GRANULOCYTES # BLD AUTO: 0 X10E3/UL (ref 0–0.1)
IMM GRANULOCYTES NFR BLD AUTO: 0 %
LYMPHOCYTES # BLD AUTO: 2.7 X10E3/UL (ref 0.7–3.1)
LYMPHOCYTES NFR BLD AUTO: 34 %
MCH RBC QN AUTO: 27.9 PG (ref 26.6–33)
MCHC RBC AUTO-ENTMCNC: 32.4 G/DL (ref 31.5–35.7)
MCV RBC AUTO: 86 FL (ref 79–97)
MONOCYTES # BLD AUTO: 0.6 X10E3/UL (ref 0.1–0.9)
MONOCYTES NFR BLD AUTO: 7 %
NEUTROPHILS # BLD AUTO: 4.6 X10E3/UL (ref 1.4–7)
NEUTROPHILS NFR BLD AUTO: 56 %
PLATELET # BLD AUTO: 339 X10E3/UL (ref 150–450)
POTASSIUM SERPL-SCNC: 5.1 MMOL/L (ref 3.5–5.2)
PROT SERPL-MCNC: 6.5 G/DL (ref 6–8.5)
RBC # BLD AUTO: 4.26 X10E6/UL (ref 3.77–5.28)
SODIUM SERPL-SCNC: 139 MMOL/L (ref 134–144)
WBC # BLD AUTO: 8.1 X10E3/UL (ref 3.4–10.8)

## 2023-02-17 NOTE — ASSESSMENT & PLAN NOTE
monitored by specialist.  Recommend follow up to reevaluate after she looses more wt. Continue with CPAP.

## 2023-02-17 NOTE — ASSESSMENT & PLAN NOTE
reviewed last a1c at goal. continue insulin as per endocrine and will need to monitor insulin requirements as she is continuing to lose wt.

## 2023-03-01 ENCOUNTER — VIRTUAL VISIT (OUTPATIENT)
Dept: SURGERY | Age: 63
End: 2023-03-01
Payer: MEDICAID

## 2023-03-01 VITALS — WEIGHT: 183.8 LBS | BODY MASS INDEX: 34.7 KG/M2 | HEART RATE: 72 BPM | HEIGHT: 61 IN

## 2023-03-01 DIAGNOSIS — E66.01 MORBID OBESITY (HCC): Primary | ICD-10-CM

## 2023-03-01 PROCEDURE — 99024 POSTOP FOLLOW-UP VISIT: CPT | Performed by: SURGERY

## 2023-03-01 NOTE — PROGRESS NOTES
Surgery Progress Note    3/1/2023    CC: Post op    Subjective:     Doing well after bypass. Some constipation. Tolerating a diet. Offer U-500 insulin. Very excited about that. Over 30 pounds down. Consent:  The patient and/or their healthcare decision maker is aware that this patient-initiated Telehealth encounter is a billable service, with coverage as determined by the patient's insurance carrier. They are aware that they may receive a bill and has provided verbal consent to proceed: Yes     This virtual visit was conducted via Set[employer]fm. Pursuant to the emergency declaration under the [facility] waiver authority and the [employer] and [employer] Act, this Virtual  Visit was conducted to reduce the patient's risk of exposure to COVID-19 and provide continuity of care for an established patient. Services were provided through a video synchronous discussion virtually to substitute for in-person clinic visit. Due to this being a TeleHealth evaluation, many elements of the physical examination are unable to be assessed. Constitutional: No fever or chills  Neurologic: No headache  Eyes: No scleral icterus or irritated eyes  Nose: No nasal pain or drainage  Mouth: No oral lesions or sore throat  Cardiac: No palpations or chest pain  Pulmonary: No cough or shortness of breath  Gastrointestinal: No nausea, emesis, diarrhea, or constipation  Genitourinary: No dysuria  Musculoskeletal: No muscle or joint tenderness  Skin: No rashes or lesions  Psychiatric: No anxiety or depressed mood    Objective:   Visit Vitals  Pulse 72   Ht 5' 1\" (1.549 m)   Wt 183 lb 12.8 oz (83.4 kg)   BMI 34.73 kg/m²       General: No acute distress, conversant      Assessment:   49-year-old female doing well after gastric bypass    Plan:     Continue to wean insulin as able. Recommend 2-3 times a day MiraLAX until regular and then MiraLAX daily.   Stop Colace. Okay for all texture food. Keep carbs low. Increase exercise. We will see her back in 4 months.       Komal Durant MD  Bariatric and General Surgeon  [facility]

## 2023-03-01 NOTE — PROGRESS NOTES
Identified pt with two pt identifiers (name and ). Reviewed chart in preparation for visit and have obtained necessary documentation. Juan Colon is a 57 y.o. female  Chief Complaint   Patient presents with    Post OP Follow Up     PO 6WK ROBOTIC GASTRIC BYPASS, POSSIBLE GASTRIC SLEEVE WITH EGD *ERAS* 23, [phone] (Mobile)     Visit Vitals  Pulse 72   Ht 5' 1\" (1.549 m)   Wt 183 lb 12.8 oz (83.4 kg)   LMP 1998   BMI 34.73 kg/m²       1. Have you been to the ER, urgent care clinic since your last visit? Hospitalized since your last visit? No    2. Have you seen or consulted any other health care providers outside of the [address] [place] [address] since your last visit? Include any pap smears or colon screening.  No

## 2023-03-30 ENCOUNTER — PATIENT MESSAGE (OUTPATIENT)
Dept: INTERNAL MEDICINE CLINIC | Age: 63
End: 2023-03-30

## 2023-03-30 RX ORDER — LOSARTAN POTASSIUM 100 MG/1
100 TABLET ORAL DAILY
Qty: 90 TABLET | Refills: 0 | Status: SHIPPED | OUTPATIENT
Start: 2023-03-30

## 2023-03-30 NOTE — TELEPHONE ENCOUNTER
From: Rishi Arango  To: Marylene Rho, NP  Sent: 3/30/2023 1:24 PM EDT  Subject: Losartan Potassium 100 MG    Im running out. Should I keep taking? If so, need refill called in - [facility] on [address]. Thank you!

## 2023-03-31 RX ORDER — NICOTINE POLACRILEX 2 MG
LOZENGE BUCCAL
Qty: 510 G | Refills: 0 | Status: SHIPPED | OUTPATIENT
Start: 2023-03-31

## 2023-04-18 RX ORDER — FLUOXETINE HYDROCHLORIDE 20 MG/1
CAPSULE ORAL
Qty: 90 CAPSULE | Refills: 1 | Status: SHIPPED | OUTPATIENT
Start: 2023-04-18

## 2023-05-16 ENCOUNTER — TELEPHONE (OUTPATIENT)
Age: 63
End: 2023-05-16

## 2023-05-16 ENCOUNTER — TELEMEDICINE (OUTPATIENT)
Age: 63
End: 2023-05-16
Payer: MEDICAID

## 2023-05-16 VITALS
HEART RATE: 70 BPM | HEIGHT: 61 IN | TEMPERATURE: 97 F | WEIGHT: 161 LBS | BODY MASS INDEX: 30.4 KG/M2 | DIASTOLIC BLOOD PRESSURE: 80 MMHG | SYSTOLIC BLOOD PRESSURE: 125 MMHG

## 2023-05-16 DIAGNOSIS — E53.8 VITAMIN B12 DEFICIENCY: ICD-10-CM

## 2023-05-16 DIAGNOSIS — K91.2 POSTSURGICAL NONABSORPTION: ICD-10-CM

## 2023-05-16 DIAGNOSIS — D64.9 ANEMIA, UNSPECIFIED TYPE: ICD-10-CM

## 2023-05-16 DIAGNOSIS — Z98.84 S/P GASTRIC BYPASS: ICD-10-CM

## 2023-05-16 DIAGNOSIS — E55.9 VITAMIN D DEFICIENCY: ICD-10-CM

## 2023-05-16 DIAGNOSIS — E66.01 MORBID OBESITY (HCC): ICD-10-CM

## 2023-05-16 DIAGNOSIS — F32.5 MAJOR DEPRESSIVE DISORDER, SINGLE EPISODE, IN FULL REMISSION (HCC): Primary | ICD-10-CM

## 2023-05-16 DIAGNOSIS — E66.01 MORBID OBESITY (HCC): Primary | ICD-10-CM

## 2023-05-16 PROCEDURE — 99212 OFFICE O/P EST SF 10 MIN: CPT | Performed by: NURSE PRACTITIONER

## 2023-05-16 PROCEDURE — 3079F DIAST BP 80-89 MM HG: CPT | Performed by: NURSE PRACTITIONER

## 2023-05-16 PROCEDURE — 3074F SYST BP LT 130 MM HG: CPT | Performed by: NURSE PRACTITIONER

## 2023-05-16 RX ORDER — FLUOXETINE HYDROCHLORIDE 20 MG/1
CAPSULE ORAL
Qty: 90 CAPSULE | Refills: 1 | Status: SHIPPED | OUTPATIENT
Start: 2023-05-16

## 2023-05-16 ASSESSMENT — PATIENT HEALTH QUESTIONNAIRE - PHQ9
2. FEELING DOWN, DEPRESSED OR HOPELESS: 0
SUM OF ALL RESPONSES TO PHQ QUESTIONS 1-9: 0
1. LITTLE INTEREST OR PLEASURE IN DOING THINGS: 0
SUM OF ALL RESPONSES TO PHQ QUESTIONS 1-9: 0
SUM OF ALL RESPONSES TO PHQ9 QUESTIONS 1 & 2: 0
SUM OF ALL RESPONSES TO PHQ QUESTIONS 1-9: 0
SUM OF ALL RESPONSES TO PHQ QUESTIONS 1-9: 0

## 2023-05-16 ASSESSMENT — ENCOUNTER SYMPTOMS
SHORTNESS OF BREATH: 0
NAUSEA: 0
VOMITING: 0

## 2023-05-16 NOTE — TELEPHONE ENCOUNTER
Medication Refill Request    Shelby Laila is requesting a refill of the following medication(s):   Fluoxetine HCL 20mg capsule  Please send refill to:     [facility]/pharmacy #[id number]- [place] - [address] [place] [address] -  [phone]  [address]  Phone: [phone] Fax: [phone]

## 2023-05-16 NOTE — PROGRESS NOTES
Identified patient with two patient identifiers (name and ). Reviewed chart in preparation for visit and have obtained necessary documentation. Hayden Mcleod is a 57 y.o. female  Chief Complaint   Patient presents with    Obesity    Post-Op Check     4.5 months post status lap gastric bypass, possible sleeve w/ EGD (E R A S) on 23, Down -57lb, Loss -22lb     /80   Pulse 70   Temp 97 °F (36.1 °C)   Ht 5' 1\" (1.549 m)   Wt 161 lb (73 kg)   BMI 30.42 kg/m²     1. Have you been to the ER, urgent care clinic since your last visit? Hospitalized since your last visit?no    2. Have you seen or consulted any other health care providers outside of the [address] [place] [address] since your last visit? Include any pap smears or colon screening.  yes - Endo

## 2023-05-16 NOTE — PROGRESS NOTES
Nay Wei (:  1960) is a 57 y.o. female,Established patient, here for evaluation of the following chief complaint(s):  Obesity and Post-Op Check (4.5 months post status lap gastric bypass, possible sleeve w/ EGD (E R A S) on 23, Down -57lb, Loss -22lb)        SUBJECTIVE/OBJECTIVE:    HPI:  Nay Wei is a 57 y.o. female with previous Malabsorptive Gastric bypass surgery on 4 months ago. . She has lost a total of 57 pounds since surgery. She  has lost 22 lbs since the last ov. Body mass index is 30.42 kg/m². Heather Danes no nausea and no vomiting. She has had some nausea or vomiting when she eats too fast or too much. . Denies Acid reflux/heartburn. . Drinking  64 ounces of water daily. 50-60 protein intake daily. + BM's., hx of constipation. Pt is getting on the elliptical for exercise. Dietary recall -    Breakfast- protein shake, yogurt  Lunch-skip, shakes  Dinner-tuna packet, chickfila grilled nuggets. She is  snacking between meals; unjury protein bar. Vitamins:  MVI : yes  Calcium : yes  B-Vit 12: yes  Vit D: Yes          Ms. Rimma Araiza has a reminder for a \"due or due soon\" health maintenance. I have asked that she contact her primary care provider for follow-up on this health maintenance.         COMORBIDITY     SLEEP APNEA                 yes      GERD  (req.meds)            NO  HYPERLIPIDEMIA            YES  HYPERTENSION              YES         DIABETES                         NO           Current Outpatient Medications:     acetaminophen (TYLENOL) 500 MG tablet, Take 2 tablets by mouth every 6 hours as needed, Disp: , Rfl:     calcium citrate (CALCITRATE) 950 (200 Ca) MG tablet, Take 2 tablets by mouth 2 times daily, Disp: , Rfl:     Cholecalciferol 50 MCG ( UT) TABS, Take by mouth, Disp: , Rfl:     FLUoxetine (PROZAC) 20 MG capsule, TAKE 3 CAPSULES BY MOUTH EVERY DAY, Disp: , Rfl:     fluticasone (FLONASE) 50 MCG/ACT nasal spray, 2 sprays by Nasal route daily, Disp: , Rfl:     ketotifen

## 2023-05-17 ENCOUNTER — TELEPHONE (OUTPATIENT)
Age: 63
End: 2023-05-17

## 2023-05-17 NOTE — TELEPHONE ENCOUNTER
Reason for call:  Patient returned Rosa's call.   I told her the prescription for Fluoxetine had been sent to her pharmacy, so if there is nothing else, no need to call her back    Is this a new problem: No    Date of last appointment:  2/16/2023     Can we respond via DirectRM: No    Best call back number: [phone]

## 2023-06-15 LAB — HBA1C MFR BLD HPLC: 7.3 %

## 2023-08-01 DIAGNOSIS — I10 ESSENTIAL (PRIMARY) HYPERTENSION: ICD-10-CM

## 2023-08-01 RX ORDER — AMLODIPINE BESYLATE 5 MG/1
TABLET ORAL
Qty: 90 TABLET | Refills: 0 | Status: SHIPPED | OUTPATIENT
Start: 2023-08-01 | End: 2023-08-17

## 2023-08-02 DIAGNOSIS — E78.5 HYPERLIPIDEMIA, UNSPECIFIED: ICD-10-CM

## 2023-08-02 RX ORDER — ROSUVASTATIN CALCIUM 20 MG/1
TABLET, COATED ORAL
Qty: 90 TABLET | Refills: 0 | Status: SHIPPED | OUTPATIENT
Start: 2023-08-02

## 2023-08-02 NOTE — TELEPHONE ENCOUNTER
Chief Complaint   Patient presents with    Medication Refill     Last Appointment with Dr. Roger Donis 2/16/23    Future Appointments   Date Time Provider [address]   8/4/2023  9:00 AM Matilda Spain RD [facility] BS AMB   8/15/2023  1:20 PM Lorenzo Ramirez APRN -  [address] BS AMB   8/17/2023  9:00 AM MD BEATRIZ Hope BS AMB   [facility]

## 2023-08-04 ENCOUNTER — OFFICE VISIT (OUTPATIENT)
Age: 63
End: 2023-08-04

## 2023-08-04 DIAGNOSIS — E66.01 MORBID OBESITY (HCC): Primary | ICD-10-CM

## 2023-08-04 NOTE — PROGRESS NOTES
Post-Operative Bariatric Nutrition Counseling - Phone Latrice Warner M.D. / Issa Vora N.P.   Name: Hannah Patterson  : 1960        Reason for visit: Follow up nutrition education and counseling post gastric bypass       ASSESSMENT:  Date of surgery:6 months    Medications/Supplements:   Prior to Admission medications    Medication Sig Start Date End Date Taking?  Authorizing Provider   rosuvastatin (CRESTOR) 20 MG tablet TAKE 1 TABLET BY MOUTH EVERY DAY 23   ALVINA Lucio NP   amLODIPine (NORVASC) 5 MG tablet TAKE 1 TABLET BY MOUTH EVERY DAY 23   ALVINA Lucio NP   losartan (COZAAR) 100 MG tablet TAKE 1 TABLET BY MOUTH EVERY DAY 23   Nancy Ibrahim MD   FLUoxetine (PROZAC) 20 MG capsule TAKE 3 CAPSULES BY MOUTH EVERY DAY 23   Nancy Ibrahim MD   acetaminophen (TYLENOL) 500 MG tablet Take 2 tablets by mouth every 6 hours as needed    Ar Automatic Reconciliation   calcium citrate (CALCITRATE) 950 (200 Ca) MG tablet Take 2 tablets by mouth 2 times daily    Ar Automatic Reconciliation   Cholecalciferol 50 MCG (2000 UT) TABS Take by mouth    Ar Automatic Reconciliation   docusate (COLACE, DULCOLAX) 100 MG CAPS Take 100 mg by mouth every other day    Ar Automatic Reconciliation   fluticasone (FLONASE) 50 MCG/ACT nasal spray 2 sprays by Nasal route daily 19   Ar Automatic Reconciliation   Insulin Glargine, 2 Unit Dial, (TOUJEO MAX SOLOSTAR) 300 UNIT/ML SOPN Inject 16 Units into the skin every morning 23   Ar Automatic Reconciliation   ketotifen (ZADITOR) 0.025 % ophthalmic solution Apply 1 drop to eye as needed    Ar Automatic Reconciliation   omeprazole (PRILOSEC) 40 MG delayed release capsule Take 40 mg by mouth daily 22   Ar Automatic Reconciliation   ondansetron (ZOFRAN-ODT) 4 MG disintegrating tablet Take 4 mg by mouth every 8 hours as needed 22   Ar Automatic Reconciliation   triamcinolone (ARISTOCORT) 0.5 % cream

## 2023-08-15 ENCOUNTER — TELEMEDICINE (OUTPATIENT)
Age: 63
End: 2023-08-15
Payer: MEDICAID

## 2023-08-15 VITALS — HEIGHT: 61 IN | BODY MASS INDEX: 26.92 KG/M2 | WEIGHT: 142.6 LBS

## 2023-08-15 DIAGNOSIS — E66.01 MORBID OBESITY (HCC): Primary | ICD-10-CM

## 2023-08-15 DIAGNOSIS — Z98.84 S/P GASTRIC BYPASS: ICD-10-CM

## 2023-08-15 PROCEDURE — 99212 OFFICE O/P EST SF 10 MIN: CPT | Performed by: NURSE PRACTITIONER

## 2023-08-15 RX ORDER — DICLOFENAC EPOLAMINE 0.01 G/1
1 SYSTEM TOPICAL EVERY 12 HOURS
COMMUNITY

## 2023-08-15 RX ORDER — METHOCARBAMOL 500 MG/1
750 TABLET, FILM COATED ORAL 2 TIMES DAILY
COMMUNITY

## 2023-08-15 RX ORDER — CELECOXIB 200 MG/1
200 CAPSULE ORAL 2 TIMES DAILY PRN
COMMUNITY
End: 2023-08-17

## 2023-08-15 ASSESSMENT — PATIENT HEALTH QUESTIONNAIRE - PHQ9
6. FEELING BAD ABOUT YOURSELF - OR THAT YOU ARE A FAILURE OR HAVE LET YOURSELF OR YOUR FAMILY DOWN: 0
4. FEELING TIRED OR HAVING LITTLE ENERGY: 0
8. MOVING OR SPEAKING SO SLOWLY THAT OTHER PEOPLE COULD HAVE NOTICED. OR THE OPPOSITE, BEING SO FIGETY OR RESTLESS THAT YOU HAVE BEEN MOVING AROUND A LOT MORE THAN USUAL: 0
1. LITTLE INTEREST OR PLEASURE IN DOING THINGS: 0
5. POOR APPETITE OR OVEREATING: 0
SUM OF ALL RESPONSES TO PHQ QUESTIONS 1-9: 0
10. IF YOU CHECKED OFF ANY PROBLEMS, HOW DIFFICULT HAVE THESE PROBLEMS MADE IT FOR YOU TO DO YOUR WORK, TAKE CARE OF THINGS AT HOME, OR GET ALONG WITH OTHER PEOPLE: 0
7. TROUBLE CONCENTRATING ON THINGS, SUCH AS READING THE NEWSPAPER OR WATCHING TELEVISION: 0
3. TROUBLE FALLING OR STAYING ASLEEP: 0
2. FEELING DOWN, DEPRESSED OR HOPELESS: 0
SUM OF ALL RESPONSES TO PHQ QUESTIONS 1-9: 0
SUM OF ALL RESPONSES TO PHQ QUESTIONS 1-9: 0
9. THOUGHTS THAT YOU WOULD BE BETTER OFF DEAD, OR OF HURTING YOURSELF: 0
SUM OF ALL RESPONSES TO PHQ QUESTIONS 1-9: 0
SUM OF ALL RESPONSES TO PHQ9 QUESTIONS 1 & 2: 0

## 2023-08-15 ASSESSMENT — ENCOUNTER SYMPTOMS
NAUSEA: 0
CHEST TIGHTNESS: 0
SHORTNESS OF BREATH: 0
VOMITING: 0
ABDOMINAL PAIN: 0

## 2023-08-17 ENCOUNTER — HOSPITAL ENCOUNTER (OUTPATIENT)
Facility: HOSPITAL | Age: 63
Setting detail: RECURRING SERIES
Discharge: HOME | End: 2023-08-20
Payer: MEDICAID

## 2023-08-17 ENCOUNTER — OFFICE VISIT (OUTPATIENT)
Age: 63
End: 2023-08-17
Payer: MEDICAID

## 2023-08-17 VITALS
DIASTOLIC BLOOD PRESSURE: 81 MMHG | TEMPERATURE: 97.3 F | OXYGEN SATURATION: 99 % | HEIGHT: 61 IN | SYSTOLIC BLOOD PRESSURE: 124 MMHG | RESPIRATION RATE: 16 BRPM | WEIGHT: 145.6 LBS | BODY MASS INDEX: 27.49 KG/M2 | HEART RATE: 82 BPM

## 2023-08-17 DIAGNOSIS — S22.080A CLOSED WEDGE COMPRESSION FRACTURE OF T12 VERTEBRA, INITIAL ENCOUNTER (HCC): ICD-10-CM

## 2023-08-17 DIAGNOSIS — Z98.84 STATUS POST BARIATRIC SURGERY: ICD-10-CM

## 2023-08-17 DIAGNOSIS — Z79.4 TYPE 2 DIABETES MELLITUS WITH MICROALBUMINURIA, WITH LONG-TERM CURRENT USE OF INSULIN (HCC): Primary | ICD-10-CM

## 2023-08-17 DIAGNOSIS — E78.5 HYPERLIPIDEMIA LDL GOAL <100: ICD-10-CM

## 2023-08-17 DIAGNOSIS — G47.33 OBSTRUCTIVE SLEEP APNEA SYNDROME: ICD-10-CM

## 2023-08-17 DIAGNOSIS — E11.29 TYPE 2 DIABETES MELLITUS WITH MICROALBUMINURIA, WITH LONG-TERM CURRENT USE OF INSULIN (HCC): Primary | ICD-10-CM

## 2023-08-17 DIAGNOSIS — I10 ESSENTIAL HYPERTENSION: ICD-10-CM

## 2023-08-17 DIAGNOSIS — K91.2 POSTSURGICAL MALABSORPTION: ICD-10-CM

## 2023-08-17 DIAGNOSIS — R80.9 TYPE 2 DIABETES MELLITUS WITH MICROALBUMINURIA, WITH LONG-TERM CURRENT USE OF INSULIN (HCC): Primary | ICD-10-CM

## 2023-08-17 DIAGNOSIS — F32.5 MAJOR DEPRESSIVE DISORDER, SINGLE EPISODE, IN FULL REMISSION (HCC): ICD-10-CM

## 2023-08-17 PROCEDURE — 3079F DIAST BP 80-89 MM HG: CPT | Performed by: INTERNAL MEDICINE

## 2023-08-17 PROCEDURE — 97161 PT EVAL LOW COMPLEX 20 MIN: CPT

## 2023-08-17 PROCEDURE — 99214 OFFICE O/P EST MOD 30 MIN: CPT | Performed by: INTERNAL MEDICINE

## 2023-08-17 PROCEDURE — 3074F SYST BP LT 130 MM HG: CPT | Performed by: INTERNAL MEDICINE

## 2023-08-17 RX ORDER — METFORMIN HYDROCHLORIDE 500 MG/1
1000 TABLET, EXTENDED RELEASE ORAL 2 TIMES DAILY
COMMUNITY
Start: 2023-07-24

## 2023-08-17 RX ORDER — CETIRIZINE HYDROCHLORIDE 10 MG/1
CAPSULE, LIQUID FILLED ORAL
COMMUNITY

## 2023-08-17 RX ORDER — ACYCLOVIR 400 MG/1
TABLET ORAL
COMMUNITY
Start: 2023-08-02

## 2023-08-17 RX ORDER — ASPIRIN 81 MG/1
TABLET ORAL
COMMUNITY

## 2023-08-17 RX ORDER — POLYETHYLENE GLYCOL 3350
POWDER (GRAM) MISCELLANEOUS
COMMUNITY
Start: 2023-03-31

## 2023-08-17 SDOH — ECONOMIC STABILITY: HOUSING INSECURITY
IN THE LAST 12 MONTHS, WAS THERE A TIME WHEN YOU DID NOT HAVE A STEADY PLACE TO SLEEP OR SLEPT IN A SHELTER (INCLUDING NOW)?: NO

## 2023-08-17 SDOH — ECONOMIC STABILITY: FOOD INSECURITY: WITHIN THE PAST 12 MONTHS, THE FOOD YOU BOUGHT JUST DIDN'T LAST AND YOU DIDN'T HAVE MONEY TO GET MORE.: NEVER TRUE

## 2023-08-17 SDOH — ECONOMIC STABILITY: FOOD INSECURITY: WITHIN THE PAST 12 MONTHS, YOU WORRIED THAT YOUR FOOD WOULD RUN OUT BEFORE YOU GOT MONEY TO BUY MORE.: NEVER TRUE

## 2023-08-17 SDOH — ECONOMIC STABILITY: INCOME INSECURITY: HOW HARD IS IT FOR YOU TO PAY FOR THE VERY BASICS LIKE FOOD, HOUSING, MEDICAL CARE, AND HEATING?: NOT HARD AT ALL

## 2023-08-17 ASSESSMENT — PATIENT HEALTH QUESTIONNAIRE - PHQ9
8. MOVING OR SPEAKING SO SLOWLY THAT OTHER PEOPLE COULD HAVE NOTICED. OR THE OPPOSITE, BEING SO FIGETY OR RESTLESS THAT YOU HAVE BEEN MOVING AROUND A LOT MORE THAN USUAL: 0
SUM OF ALL RESPONSES TO PHQ QUESTIONS 1-9: 1
SUM OF ALL RESPONSES TO PHQ QUESTIONS 1-9: 1
5. POOR APPETITE OR OVEREATING: 0
9. THOUGHTS THAT YOU WOULD BE BETTER OFF DEAD, OR OF HURTING YOURSELF: 0
7. TROUBLE CONCENTRATING ON THINGS, SUCH AS READING THE NEWSPAPER OR WATCHING TELEVISION: 0
10. IF YOU CHECKED OFF ANY PROBLEMS, HOW DIFFICULT HAVE THESE PROBLEMS MADE IT FOR YOU TO DO YOUR WORK, TAKE CARE OF THINGS AT HOME, OR GET ALONG WITH OTHER PEOPLE: 0
4. FEELING TIRED OR HAVING LITTLE ENERGY: 0
3. TROUBLE FALLING OR STAYING ASLEEP: 1
SUM OF ALL RESPONSES TO PHQ9 QUESTIONS 1 & 2: 0
2. FEELING DOWN, DEPRESSED OR HOPELESS: 0
6. FEELING BAD ABOUT YOURSELF - OR THAT YOU ARE A FAILURE OR HAVE LET YOURSELF OR YOUR FAMILY DOWN: 0
SUM OF ALL RESPONSES TO PHQ QUESTIONS 1-9: 1
SUM OF ALL RESPONSES TO PHQ QUESTIONS 1-9: 1
1. LITTLE INTEREST OR PLEASURE IN DOING THINGS: 0

## 2023-08-17 ASSESSMENT — ENCOUNTER SYMPTOMS
ABDOMINAL PAIN: 0
SHORTNESS OF BREATH: 0
CONSTIPATION: 1

## 2023-08-17 NOTE — ASSESSMENT & PLAN NOTE
Check lab work to assess for goal and continue current medication. With 70 pound weight loss from her bariatric surgery may be able to decrease her rosuvastatin.

## 2023-08-17 NOTE — THERAPY EVALUATION
since onset  Pain:   8/10 max 1-2/10 min /10 now     Location of symptoms: lower thoracic area, upper lumbar   Description of symptoms: achy, sharp  Limitations to PLOF/Activity or Recreational Limitations: sitting, sit to stand, lifting, going to bed, getting up in the morning  Eased by: heat, pain patches. Has not tried ice  Any tingling/numbness none  PMHx/Surgical Hx/Comorbidites:  , hysterectomy, lili mastectomy, gastric bypass (has lost 70 lbs since  of this year.), depression, diabetes, HTN  Prior Hospitalization: see [facility]  Occupation: retired-used to  [employer] alone. Has a son. Is going to [employer] in a week or so. No grandchildren. Prior level of function/activity level: Was working out with a  and doing massages regularly. Able to do ADL's without back pain  Pt goals: \" feel better \"  Exercise preferences: has access to ellip at home. Bike at home gym in neighborhood. Motivation: very motivated  Cognition: A & O x 4         Barriers: []pain []Financial []time []transportation [x]Other: none  Substance use: []Alcohol []Tobacco [x]other: not asked      OBJECTIVE    AROM lumbar spine  Flexion: 1 in sup patella, p! Extension: 15% hobbs  SB: 50% hobbs lili p! Rotation: 15 % hobbs, \"stiff\"    Pt stands with excessive anterior hip translation, hanging on ant hip ligaments, minimal core engagement noted. TTP: lower thoracic paraspinals and upper lumbar paraspinals lili     Flexibility: WNL    Joint mobility: NT due to T12 compression fracture    MMT   Hip flexors:4-  Hip Extensors: 3+  Hip Abductors:3+  Hip Adductors:3+    Strength of TrA: poor/absent. Unable to palpate trace contraction here    Objective/Functional Outcome Measure: see FOTO  FOTO Score: see FOTO   FOTO score = an established functional score where 100 = no disability      40 min [x]Eval - untimed           rec back brace for travel. Icing: prone with two pillows.   Fleet

## 2023-08-17 NOTE — PROGRESS NOTES
Depression    H/O bilateral mastectomy    History of carcinoma in situ of breast    Obstructive sleep apnea syndrome    Class 2 severe obesity due to excess calories with serious comorbidity and body mass index (BMI) of 36.0 to 36.9 in adult [facility])    Type 2 diabetes mellitus with microalbuminuria, with long-term current use of insulin ([address])     Taking medication with no side effects. Exercise: regularly Diet following a baratric diet witj decreased sugar and carbs   BP rangin's/70's  BS: 30 day average 122     Since last visit: had bariatric surgery 2023. Has lost 70 pounds. Has been seen by ortho and dx a vertebral compression fx T12 and has been referred to PT. Her pain is improving. Current Outpatient Medications   Medication Sig Dispense Refill    aspirin 81 MG EC tablet 1 tab(s) orally once a day for 30 day(s)      Continuous Blood Gluc Sensor (DEXCOM G7 SENSOR) MISC APPLY 1 SENSOR UNDER THE SKIN EVERY 10 DAYS 90 DAYS      metFORMIN (GLUCOPHAGE-XR) 500 MG extended release tablet Take 2 tablets by mouth 2 times daily      Polyethylene Glycol 3350 POWD TAKE 17 G BY MOUTH DAILY FOR 30 DAYS. Cetirizine HCl (ZYRTEC ALLERGY) 10 MG CAPS 1 tab(s) chewed once a day      Multiple Vitamins-Minerals (BARIATRIC MULTIVITAMINS/IRON PO) Take 1 tablet by mouth daily      methocarbamol (ROBAXIN) 500 MG tablet Take 1.5 tablets by mouth in the morning and 1.5 tablets in the evening. diclofenac (FLECTOR) 1.3 % PTCH patch Place 1 patch onto the skin in the morning and 1 patch in the evening.       rosuvastatin (CRESTOR) 20 MG tablet TAKE 1 TABLET BY MOUTH EVERY DAY 90 tablet 0    losartan (COZAAR) 100 MG tablet TAKE 1 TABLET BY MOUTH EVERY DAY 90 tablet 0    FLUoxetine (PROZAC) 20 MG capsule TAKE 3 CAPSULES BY MOUTH EVERY DAY 90 capsule 1    acetaminophen (TYLENOL) 500 MG tablet Take 2 tablets by mouth every 6 hours as needed      calcium citrate (CALCITRATE) 950 (200 Ca) MG tablet Take 2 tablets by mouth 2

## 2023-08-17 NOTE — ASSESSMENT & PLAN NOTE
Reviewed last bone density and refer to endocrinology to discuss treatment options. She is also being followed by orthopedics.

## 2023-08-21 ENCOUNTER — HOSPITAL ENCOUNTER (OUTPATIENT)
Facility: HOSPITAL | Age: 63
Setting detail: RECURRING SERIES
Discharge: HOME | End: 2023-08-24
Payer: MEDICAID

## 2023-08-21 PROCEDURE — 97110 THERAPEUTIC EXERCISES: CPT

## 2023-08-21 NOTE — PROGRESS NOTES
PHYSICAL THERAPY - MEDICARE DAILY TREATMENT NOTE (updated 3/23)      Date: 2023          Patient Name:  Eufemia Camacho :  1960   Medical   Diagnosis:  Back pain [M54.9] Treatment Diagnosis:  M54.59  OTHER LOWER BACK PAIN and M54.89  OTHER DORSALGIA    Referral Source:  Kelly Hickman MD Insurance:   Payor: Richgerry Fariain / Plan: [employer] / Product Type: *No Product type* /                     Patient  verified yes     Visit #   Current  / Total 2 24   Time   In / Out 100 P 145 P   Total Treatment Time 45   Total Timed Codes 35   1:1 Treatment Time 35      [facility] Totals Reminder:  bill using total billable   min of TIMED therapeutic procedures and modalities. 8-22 min = 1 unit; 23-37 min = 2 units; 38-52 min = 3 units; 53-67 min = 4 units; 68-82 min = 5 units          SUBJECTIVE    Pain Level (0-10 scale): 2    Any medication changes, allergies to medications, adverse drug reactions, diagnosis change, or new procedure performed?: [x] No    [] Yes (see summary sheet for update)  Medications: Verified on Patient Summary List    Subjective functional status/changes:     Less pain. Got new shoes, they feel great. Ordered an ice pack, did not come in yet. Isn't taking as many of her pain meds as before. Has been decreasing her walk times which seems to be helping. OBJECTIVE    Therapeutic Procedures: Tx Min Billable or 1:1 Min (if diff from Tx Min) Procedure, Rationale, Specifics   35  73210 Therapeutic Exercise (timed):  increase ROM, strength, coordination, balance, and proprioception to improve patient's ability to progress to PLOF and address remaining functional goals. (see flow sheet as applicable)     Details if applicable:  HEP printed; bands given. -  [id number] Manual Therapy (timed):  decrease pain and increase tissue extensibility to improve patient's ability to progress to PLOF and address remaining functional goals.   The manual therapy interventions were

## 2023-08-23 ENCOUNTER — HOSPITAL ENCOUNTER (OUTPATIENT)
Facility: HOSPITAL | Age: 63
Setting detail: RECURRING SERIES
Discharge: HOME | End: 2023-08-26
Payer: MEDICAID

## 2023-08-23 PROCEDURE — 97140 MANUAL THERAPY 1/> REGIONS: CPT

## 2023-08-23 PROCEDURE — 97110 THERAPEUTIC EXERCISES: CPT

## 2023-08-23 NOTE — PROGRESS NOTES
PHYSICAL THERAPY - MEDICARE DAILY TREATMENT NOTE (updated 3/23)      Date: 2023          Patient Name:  Olamide Andre :  1960   Medical   Diagnosis:  Back pain [M54.9] Treatment Diagnosis:  M54.59  OTHER LOWER BACK PAIN and M54.89  OTHER DORSALGIA    Referral Source:  Orlin Layne MD Insurance:   Payor: Riley Fail / Plan: Carlos Thomas / Product Type: *No Product type* /                     Patient  verified yes     Visit #   Current  / Total 3 24   Time   In / Out 8:30 AM 9:25 AM   Total Treatment Time 55   Total Timed Codes 45   1:1 Treatment Time 30      [facility] Totals Reminder:  bill using total billable   min of TIMED therapeutic procedures and modalities. 8-22 min = 1 unit; 23-37 min = 2 units; 38-52 min = 3 units; 53-67 min = 4 units; 68-82 min = 5 units          SUBJECTIVE    Pain Level (0-10 scale): 1    Any medication changes, allergies to medications, adverse drug reactions, diagnosis change, or new procedure performed?: [x] No    [] Yes (see summary sheet for update)  Medications: Verified on Patient Summary List    Subjective functional status/changes:     \"I feel like the exercises are really helping. \"     OBJECTIVE    Therapeutic Procedures: Tx Min Billable or 1:1 Min (if diff from Tx Min) Procedure, Rationale, Specifics   35 20 74461 Therapeutic Exercise (timed):  increase ROM, strength, coordination, balance, and proprioception to improve patient's ability to progress to PLOF and address remaining functional goals. (see flow sheet as applicable)     Details if applicable:  HEP printed; bands given. 10 10 74333 Manual Therapy (timed):  decrease pain and increase tissue extensibility to improve patient's ability to progress to PLOF and address remaining functional goals. The manual therapy interventions were performed at a separate and distinct time from the therapeutic activities interventions .  (see flow sheet as applicable)     Details if

## 2023-08-24 LAB
25(OH)D3+25(OH)D2 SERPL-MCNC: 80.5 NG/ML (ref 30–100)
ALBUMIN SERPL-MCNC: 4.4 G/DL (ref 3.9–4.9)
ALBUMIN/CREAT UR: 24 MG/G CREAT (ref 0–29)
ALBUMIN/GLOB SERPL: 2.2 {RATIO} (ref 1.2–2.2)
ALP SERPL-CCNC: 138 IU/L (ref 44–121)
ALT SERPL-CCNC: 20 IU/L (ref 0–32)
AST SERPL-CCNC: 25 IU/L (ref 0–40)
BILIRUB SERPL-MCNC: <0.2 MG/DL (ref 0–1.2)
BUN SERPL-MCNC: 7 MG/DL (ref 8–27)
BUN/CREAT SERPL: 14 (ref 12–28)
CALCIUM SERPL-MCNC: 9.9 MG/DL (ref 8.7–10.3)
CHLORIDE SERPL-SCNC: 93 MMOL/L (ref 96–106)
CHOLEST SERPL-MCNC: 95 MG/DL (ref 100–199)
CO2 SERPL-SCNC: 19 MMOL/L (ref 20–29)
CREAT SERPL-MCNC: 0.49 MG/DL (ref 0.57–1)
CREAT UR-MCNC: 53.9 MG/DL
EGFRCR SERPLBLD CKD-EPI 2021: 105 ML/MIN/1.73
FERRITIN SERPL-MCNC: 42 NG/ML (ref 15–150)
GLOBULIN SER CALC-MCNC: 2 G/DL (ref 1.5–4.5)
GLUCOSE SERPL-MCNC: 104 MG/DL (ref 70–99)
HBA1C MFR BLD: 6.5 % (ref 4.8–5.6)
HDLC SERPL-MCNC: 44 MG/DL
IRON SATN MFR SERPL: 12 % (ref 15–55)
IRON SERPL-MCNC: 39 UG/DL (ref 27–139)
LDLC SERPL CALC-MCNC: 31 MG/DL (ref 0–99)
MICROALBUMIN UR-MCNC: 12.8 UG/ML
POTASSIUM SERPL-SCNC: 4.4 MMOL/L (ref 3.5–5.2)
PROT SERPL-MCNC: 6.4 G/DL (ref 6–8.5)
SODIUM SERPL-SCNC: 133 MMOL/L (ref 134–144)
TIBC SERPL-MCNC: 326 UG/DL (ref 250–450)
TRIGL SERPL-MCNC: 110 MG/DL (ref 0–149)
TSH SERPL DL<=0.005 MIU/L-ACNC: 2.78 UIU/ML (ref 0.45–4.5)
UIBC SERPL-MCNC: 287 UG/DL (ref 118–369)
VIT B12 SERPL-MCNC: 1022 PG/ML (ref 232–1245)
VLDLC SERPL CALC-MCNC: 20 MG/DL (ref 5–40)

## 2023-08-28 ENCOUNTER — TELEPHONE (OUTPATIENT)
Age: 63
End: 2023-08-28

## 2023-08-28 DIAGNOSIS — F32.5 MAJOR DEPRESSIVE DISORDER, SINGLE EPISODE, IN FULL REMISSION (HCC): ICD-10-CM

## 2023-08-28 RX ORDER — FLUOXETINE HYDROCHLORIDE 20 MG/1
CAPSULE ORAL
Qty: 90 CAPSULE | Refills: 5 | Status: SHIPPED | OUTPATIENT
Start: 2023-08-28

## 2023-08-28 RX ORDER — FLUOXETINE HYDROCHLORIDE 20 MG/1
CAPSULE ORAL
Qty: 90 CAPSULE | Refills: 1 | Status: SHIPPED | OUTPATIENT
Start: 2023-08-28 | End: 2023-08-28 | Stop reason: SDUPTHER

## 2023-08-28 NOTE — TELEPHONE ENCOUNTER
Medication Refill Request    Eliseo Pritchard is requesting a refill of the following medication(s):   FLUoxetine (PROZAC) 20 MG capsule                Please send refill to:     [facility]/pharmacy #[id number]- [place] [address] [phone]  [address] [place] [address]   Phone: [phone] Fax: [phone]

## 2023-08-30 RX ORDER — ROSUVASTATIN CALCIUM 5 MG/1
5 TABLET, COATED ORAL DAILY
Qty: 90 TABLET | Refills: 1 | Status: SHIPPED | OUTPATIENT
Start: 2023-08-30

## 2023-09-05 ENCOUNTER — HOSPITAL ENCOUNTER (OUTPATIENT)
Facility: HOSPITAL | Age: 63
Setting detail: RECURRING SERIES
Discharge: HOME | End: 2023-09-08
Payer: MEDICAID

## 2023-09-05 DIAGNOSIS — I10 ESSENTIAL HYPERTENSION: ICD-10-CM

## 2023-09-05 PROCEDURE — 97110 THERAPEUTIC EXERCISES: CPT

## 2023-09-05 RX ORDER — LOSARTAN POTASSIUM 100 MG/1
TABLET ORAL
Qty: 90 TABLET | Refills: 0 | Status: SHIPPED | OUTPATIENT
Start: 2023-09-05

## 2023-09-05 NOTE — PROGRESS NOTES
Details if applicable:  STM B thoracic and lumbar paraspinals in prone with 2 pillows under pelvis   40 30    Total Total     Modalities Rationale:     decrease pain to improve patient's ability to progress to PLOF and address remaining functional goals. 10 min  unbilled [x]  Ice     []  Heat           Position:supine, lili LE on wedge  Location: low back   Skin assessment post-treatment (if applicable):    [x]  intact    []  redness- no adverse reaction                 []redness - adverse reaction:        [x]  Patient Education billed concurrently with other procedures   [x] Review HEP    [] Progressed/Changed HEP, detail:  [] Other detail:    Other Objective/Functional Measures      Pain Level at end of session (0-10 scale): 0      Assessment     Progress toward goals / Updated goals:    Decreased pain levels overall. Strength progressing.       PLAN  Yes  Continue plan of care    Re-Cert Due: 96/93/65  []  Upgrade activities as tolerated  []  Discharge due to :  []  Other:      Ismael Gamez PTA       9/5/2023       10:44 AM

## 2023-09-07 ENCOUNTER — HOSPITAL ENCOUNTER (OUTPATIENT)
Facility: HOSPITAL | Age: 63
Setting detail: RECURRING SERIES
Discharge: HOME | End: 2023-09-10
Payer: MEDICAID

## 2023-09-07 PROCEDURE — 97110 THERAPEUTIC EXERCISES: CPT

## 2023-09-07 NOTE — PROGRESS NOTES
PHYSICAL THERAPY - MEDICARE DAILY TREATMENT NOTE (updated 3/23)      Date: 2023          Patient Name:  Yusuf March :  1960   Medical   Diagnosis:  Back pain [M54.9] Treatment Diagnosis:  M54.59  OTHER LOWER BACK PAIN and M54.89  OTHER DORSALGIA    Referral Source:  Claudean Notice, MD Insurance:   Payor: Ragini Patient / Plan: [employer] / Product Type: *No Product type* /                     Patient  verified yes     Visit #   Current  / Total 5 24   Time   In / Out 10:00 AM 1050 A   Total Treatment Time 50   Total Timed Codes 40   1:1 Treatment Time 40      [facility] Totals Reminder:  bill using total billable   min of TIMED therapeutic procedures and modalities. 8-22 min = 1 unit; 23-37 min = 2 units; 38-52 min = 3 units; 53-67 min = 4 units; 68-82 min = 5 units          SUBJECTIVE    Pain Level (0-10 scale): 0    Any medication changes, allergies to medications, adverse drug reactions, diagnosis change, or new procedure performed?: [x] No    [] Yes (see summary sheet for update)  Medications: Verified on Patient Summary List    Subjective functional status/changes:     Feels great today. Has not been doing her exercises, but has been alternating between heat/ice and has also been walking (careful not to overdo it)    OBJECTIVE    Therapeutic Procedures: Tx Min Billable or 1:1 Min (if diff from Tx Min) Procedure, Rationale, Specifics   40  01865 Therapeutic Exercise (timed):  increase ROM, strength, coordination, balance, and proprioception to improve patient's ability to progress to PLOF and address remaining functional goals. (see flow sheet as applicable)     Details if applicable:  discussed putting exercises accessories in living room out to encourage compliance. 73108 Manual Therapy (timed):  decrease pain and increase tissue extensibility to improve patient's ability to progress to PLOF and address remaining functional goals.   The manual therapy

## 2023-09-12 ENCOUNTER — HOSPITAL ENCOUNTER (OUTPATIENT)
Facility: HOSPITAL | Age: 63
Setting detail: RECURRING SERIES
Discharge: HOME | End: 2023-09-15
Payer: MEDICAID

## 2023-09-12 PROCEDURE — 97110 THERAPEUTIC EXERCISES: CPT

## 2023-09-12 NOTE — PROGRESS NOTES
PHYSICAL THERAPY - MEDICARE DAILY TREATMENT NOTE (updated 3/23)      Date: 2023          Patient Name:  Hannah Patterson :  1960   Medical   Diagnosis:  Back pain [M54.9] Treatment Diagnosis:  M54.59  OTHER LOWER BACK PAIN and M54.89  OTHER DORSALGIA    Referral Source:  Sav Lunsford MD Insurance:   Payor: Karly Herring / Plan: Ricardo Joyce / Product Type: *No Product type* /                     Patient  verified yes     Visit #   Current  / Total 6 24   Time   In / Out 1025 A 1105  A   Total Treatment Time 40   Total Timed Codes 30   1:1 Treatment Time 30      [facility] Totals Reminder:  bill using total billable   min of TIMED therapeutic procedures and modalities. 8-22 min = 1 unit; 23-37 min = 2 units; 38-52 min = 3 units; 53-67 min = 4 units; 68-82 min = 5 units          SUBJECTIVE    Pain Level (0-10 scale): 0    Any medication changes, allergies to medications, adverse drug reactions, diagnosis change, or new procedure performed?: [x] No    [] Yes (see summary sheet for update)  Medications: Verified on Patient Summary List    Subjective functional status/changes:     No pain, no increased soreness, feels great. OBJECTIVE    Therapeutic Procedures: Tx Min Billable or 1:1 Min (if diff from Tx Min) Procedure, Rationale, Specifics   30  05072 Therapeutic Exercise (timed):  increase ROM, strength, coordination, balance, and proprioception to improve patient's ability to progress to PLOF and address remaining functional goals. (see flow sheet as applicable)     Details if applicable:       42469 Manual Therapy (timed):  decrease pain and increase tissue extensibility to improve patient's ability to progress to PLOF and address remaining functional goals. The manual therapy interventions were performed at a separate and distinct time from the therapeutic activities interventions .  (see flow sheet as applicable)     Details if applicable:  STM B thoracic and lumbar

## 2023-09-14 ENCOUNTER — HOSPITAL ENCOUNTER (OUTPATIENT)
Facility: HOSPITAL | Age: 63
Setting detail: RECURRING SERIES
Discharge: HOME | End: 2023-09-17
Payer: MEDICAID

## 2023-09-14 PROCEDURE — 97110 THERAPEUTIC EXERCISES: CPT

## 2023-09-14 NOTE — PROGRESS NOTES
PHYSICAL THERAPY - MEDICARE DAILY TREATMENT NOTE (updated 3/23)      Date: 2023          Patient Name:  Brennen Renteria :  1960         Medical   Diagnosis:  Back pain [M54.9] Treatment Diagnosis:  M54.59  OTHER LOWER BACK PAIN and M54.89  OTHER DORSALGIA    Referral Source:  Tray Atwood MD Insurance:   Payor: Ashley Noble / Plan: [employer] / Product Type: *No Product type* /                     Patient  verified yes     Visit #   Current  / Total 7 24   Time   In / Out 100 P  140 P   Total Treatment Time 40   Total Timed Codes 30   1:1 Treatment Time 30      [facility] Totals Reminder:  bill using total billable   min of TIMED therapeutic procedures and modalities. 8-22 min = 1 unit; 23-37 min = 2 units; 38-52 min = 3 units; 53-67 min = 4 units; 68-82 min = 5 units          SUBJECTIVE    Pain Level (0-10 scale): 0    Any medication changes, allergies to medications, adverse drug reactions, diagnosis change, or new procedure performed?: [x] No    [] Yes (see summary sheet for update)  Medications: Verified on Patient Summary List    Subjective functional status/changes:     Had a little soreness after last time, but it didn't last long. OBJECTIVE    Therapeutic Procedures: Tx Min Billable or 1:1 Min (if diff from Tx Min) Procedure, Rationale, Specifics   30  06213 Therapeutic Exercise (timed):  increase ROM, strength, coordination, balance, and proprioception to improve patient's ability to progress to PLOF and address remaining functional goals. (see flow sheet as applicable)     Details if applicable:       82142 Manual Therapy (timed):  decrease pain and increase tissue extensibility to improve patient's ability to progress to PLOF and address remaining functional goals. The manual therapy interventions were performed at a separate and distinct time from the therapeutic activities interventions .  (see flow sheet as applicable)     Details if applicable:  [facility]

## 2023-09-19 ENCOUNTER — HOSPITAL ENCOUNTER (OUTPATIENT)
Facility: HOSPITAL | Age: 63
Setting detail: RECURRING SERIES
Discharge: HOME | End: 2023-09-22
Payer: MEDICAID

## 2023-09-19 PROCEDURE — 97110 THERAPEUTIC EXERCISES: CPT

## 2023-09-19 NOTE — PROGRESS NOTES
Physical Therapy at [facility],   a part of [address] [place] [address] Edyta Flanagan, Deion S Fareed Dobbins  Phone: [phone]  Fax: [phone]     PHYSICAL THERAPY PROGRESS NOTE  Patient Name:  Hannah Patterson :  1960   Treatment/Medical Diagnosis: Back pain [M54.9]   Referral Source:  Sav Lunsford MD     Date of Initial Visit:  23 Attended Visits:  8 Missed Visits:  0     SUMMARY OF TREATMENT/ASSESSMENT:  Pt with improved ROM, improved strength. Walking and standing endurance gradually increasing. CURRENT STATUS    Short Term Goals: To be accomplished in 5 treatments  -Independent in HEP as evidenced on ability to perform at least 5 exercises from HEP using proper form without verbal cuing. -MET  -Pain less than or equal to 6/10 at worst to allow patient to perform ADL's with greater ease-MET 3/10 after activity, more stiffness than pain  -Demostrate proper posture/TrA engagement in sitting and standing in order to decrease lumbar pain-MET  -Pt will report compliance with icing 1-2x/day in order to decrease inflammation-MET     Long Term Goals: To be accomplished in 12 weeks  -AROM lumbar spine full and pain free all planes to allow pt to do housework without pain-PROGRESSING  -MMT greater than or equal to 4+/5 all planes to allow patient to perform ADL's-PROGRESSING  -Pain 0/10 to allow patient to participate in modified home gym routine with her -PROGRESSING  -Pt will be able to sit for 2 hours without back pain-PROGRESSING, 1.5 hours is the max at this point.   -Pain with sleeping/waking eliminated-MET      RECOMMENDATIONS  Continue 2x/week until next Progress note. Sandra Heller, PT       2023       11:01 AM    If you have any questions/comments please contact us directly at [phone]. Thank you for allowing us to assist in the care of your patient.
functional goals. 10 min  unbilled [x]  Ice     []  Heat           Position:supine, lili LE on wedge  Location: low back   Skin assessment post-treatment (if applicable):    [x]  intact    []  redness- no adverse reaction                 []redness - adverse reaction:        [x]  Patient Education billed concurrently with other procedures   [x] Review HEP    [] Progressed/Changed HEP, detail:  [] Other detail:    Other Objective/Functional Measures  AROM lumbar spine  Flexion: 1 inf patella,  Extension: WNL  SB: 25% hobbs lili p! Rotation: WNL       TTP:none     Joint mobility: NT due to T12 compression fracture     MMT lili  Hip flexors:4+  Hip Extensors: 4-  Hip Abductors:4-  Hip Adductors:4-     Strength of TrA: min to mod strength     Pain Level at end of session (0-10 scale): 0      Assessment     Progress toward goals / Updated goals:      See Progress note dated today.     PLAN  Yes  Continue plan of care    Re-Cert Due: 58/05/52  []  Upgrade activities as tolerated  []  Discharge due to :  [x]  Other:Continue 2x/week for another month until next re-assessment. (pt going to be on trips for a part of month.)      Kailash Pugh, PT       9/19/2023       11:00 AM

## 2023-09-21 ENCOUNTER — HOSPITAL ENCOUNTER (OUTPATIENT)
Facility: HOSPITAL | Age: 63
Setting detail: RECURRING SERIES
Discharge: HOME | End: 2023-09-24
Payer: MEDICAID

## 2023-09-21 PROCEDURE — 97110 THERAPEUTIC EXERCISES: CPT

## 2023-09-21 NOTE — PROGRESS NOTES
PHYSICAL THERAPY - MEDICARE DAILY TREATMENT NOTE (updated 3/23)      Date: 2023          Patient Name:  Rupa Simental :  1960         Medical   Diagnosis:  Back pain [M54.9] Treatment Diagnosis:  M54.59  OTHER LOWER BACK PAIN and M54.89  OTHER DORSALGIA    Referral Source:  Gabrielle Barfield MD Insurance:   Payor: Elizabeth Ramirez / Plan: [email or web address] / Product Type: *No Product type* /                     Patient  verified yes     Visit #   Current  / Total 9 24   Time   In / Out 1025 A 1135 A   Total Treatment Time 70   Total Timed Codes 60   1:1 Treatment Time 60      [facility] Totals Reminder:  bill using total billable   min of TIMED therapeutic procedures and modalities. 8-22 min = 1 unit; 23-37 min = 2 units; 38-52 min = 3 units; 53-67 min = 4 units; 68-82 min = 5 units          SUBJECTIVE    Pain Level (0-10 scale): 0    Any medication changes, allergies to medications, adverse drug reactions, diagnosis change, or new procedure performed?: [x] No    [] Yes (see summary sheet for update)  Medications: Verified on Patient Summary List    Subjective functional status/changes:     No pain, feels as strong as she was when she was 16. OBJECTIVE    Therapeutic Procedures: Tx Min Billable or 1:1 Min (if diff from Tx Min) Procedure, Rationale, Specifics   60  10652 Therapeutic Exercise (timed):  increase ROM, strength, coordination, balance, and proprioception to improve patient's ability to progress to PLOF and address remaining functional goals. (see flow sheet as applicable)     Details if applicable:  advised inc walk time to 20 min OR inc frequency to daily at the 15 min time frame. Advised to bring brace with on travels and wear outside of clothing. Gave blue band for home use for supine clams. 60     Total Total     Modalities Rationale:     decrease pain to improve patient's ability to progress to PLOF and address remaining functional goals.     10 min  unbilled [x]

## 2023-09-25 ENCOUNTER — HOSPITAL ENCOUNTER (OUTPATIENT)
Facility: HOSPITAL | Age: 63
Setting detail: RECURRING SERIES
Discharge: HOME | End: 2023-09-28
Payer: MEDICAID

## 2023-09-25 PROCEDURE — 97110 THERAPEUTIC EXERCISES: CPT

## 2023-09-25 NOTE — PROGRESS NOTES
PHYSICAL THERAPY - MEDICARE DAILY TREATMENT NOTE (updated 3/23)      Date: 2023          Patient Name:  Isaac Hidalgo :  1960         Medical   Diagnosis:  Back pain [M54.9] Treatment Diagnosis:  M54.59  OTHER LOWER BACK PAIN and M54.89  OTHER DORSALGIA    Referral Source:  Pio Sanders MD Insurance:   Payor: [employer] / Plan: [employer] / Product Type: *No Product type* /                     Patient  verified yes     Visit #   Current  / Total 10 24   Time   In / Out 925 A 1025 A   Total Treatment Time 60   Total Timed Codes 50   1:1 Treatment Time 30      [facility] Totals Reminder:  bill using total billable   min of TIMED therapeutic procedures and modalities. 8-22 min = 1 unit; 23-37 min = 2 units; 38-52 min = 3 units; 53-67 min = 4 units; 68-82 min = 5 units          SUBJECTIVE    Pain Level (0-10 scale): 0    Any medication changes, allergies to medications, adverse drug reactions, diagnosis change, or new procedure performed?: [x] No    [] Yes (see summary sheet for update)  Medications: Verified on Patient Summary List    Subjective functional status/changes:     No pain, feels as strong as she was when she was 16. OBJECTIVE    Therapeutic Procedures: Tx Min Billable or 1:1 Min (if diff from Tx Min) Procedure, Rationale, Specifics   60 30 56375 Therapeutic Exercise (timed):  increase ROM, strength, coordination, balance, and proprioception to improve patient's ability to progress to PLOF and address remaining functional goals. (see flow sheet as applicable)     Details if applicable:     60 30    Total Total     Modalities Rationale:     decrease pain to improve patient's ability to progress to PLOF and address remaining functional goals.     10 min  unbilled [x]  Ice     []  Heat           Position:supine, lili LE on wedge  Location: low back   Skin assessment post-treatment (if applicable):    [x]  intact    []  redness- no adverse reaction

## 2023-09-28 ENCOUNTER — OFFICE VISIT (OUTPATIENT)
Age: 63
End: 2023-09-28

## 2023-09-28 DIAGNOSIS — E66.01 MORBID OBESITY (HCC): Primary | ICD-10-CM

## 2023-09-28 NOTE — PROGRESS NOTES
[facility] at [facility]  Bariatric Surgery Program         Patient's Name: Avril Spencer  : 1960    This patient attended a 1 hour virtual Back to Basics nutrition class as part of the [facility] Bariatric Surgery program at [facility]. This class is part of the post-op nutrition program for weight loss surgery patients. We reviewed key nutrition guidelines for bariatric surgery including portion sizes, protein, fluids, vitamins, exercise and behavior modification.       Kristie Ayala RD

## 2023-09-29 ENCOUNTER — HOSPITAL ENCOUNTER (OUTPATIENT)
Facility: HOSPITAL | Age: 63
Setting detail: RECURRING SERIES
End: 2023-09-29
Payer: MEDICAID

## 2023-09-29 PROCEDURE — 97110 THERAPEUTIC EXERCISES: CPT

## 2023-09-29 NOTE — PROGRESS NOTES
PHYSICAL THERAPY - MEDICARE DAILY TREATMENT NOTE (updated 3/23)      Date: 2023          Patient Name:  Mara Pierce :  1960         Medical   Diagnosis:  Back pain [M54.9] Treatment Diagnosis:  M54.59  OTHER LOWER BACK PAIN and M54.89  OTHER DORSALGIA    Referral Source:  Stephanie Felix MD Insurance:   Payor: Alt[employer] Bernice / Plan: Teresa Perera / Product Type: *No Product type* /                     Patient  verified yes     Visit #   Current  / Total 11 24   Time   In / Out 9:30 A 1030 A   Total Treatment Time 60   Total Timed Codes 50   1:1 Treatment Time 40      [facility] Totals Reminder:  bill using total billable   min of TIMED therapeutic procedures and modalities. 8-22 min = 1 unit; 23-37 min = 2 units; 38-52 min = 3 units; 53-67 min = 4 units; 68-82 min = 5 units          SUBJECTIVE    Pain Level (0-10 scale): 0    Any medication changes, allergies to medications, adverse drug reactions, diagnosis change, or new procedure performed?: [x] No    [] Yes (see summary sheet for update)  Medications: Verified on Patient Summary List    Subjective functional status/changes:     Progressing well with standing exercises. No back pain. OBJECTIVE    Therapeutic Procedures: Tx Min Billable or 1:1 Min (if diff from Tx Min) Procedure, Rationale, Specifics   60 40 12756 Therapeutic Exercise (timed):  increase ROM, strength, coordination, balance, and proprioception to improve patient's ability to progress to PLOF and address remaining functional goals. (see flow sheet as applicable)     Details if applicable:     60 40    Total Total     Modalities Rationale:     decrease pain to improve patient's ability to progress to PLOF and address remaining functional goals.     10 min  unbilled [x]  Ice     []  Heat           Position:supine, llii LE on wedge  Location: low back   Skin assessment post-treatment (if applicable):    [x]  intact    []  redness- no adverse reaction []redness - adverse reaction:        [x]  Patient Education billed concurrently with other procedures   [x] Review HEP    [] Progressed/Changed HEP, detail:  [] Other detail:    Other Objective/Functional Measures      Pain Level at end of session (0-10 scale): 0      Assessment     Progress toward goals / Updated goals:    Good tolerance to standing exercises. Minor cues for neutral spine during TB shoulder series with TrA brace otherwise she did very well today.       PLAN  Yes  Continue plan of care    Re-Cert Due: 64/01/32  []  Upgrade activities as tolerated  []  Discharge due to :  []  Other:      Neris Lorenzo PTA       9/29/2023       9:35 AM

## 2023-11-13 ENCOUNTER — TELEPHONE (OUTPATIENT)
Age: 63
End: 2023-11-13

## 2023-11-13 NOTE — TELEPHONE ENCOUNTER
LM for pt to call and schedule annual bariatric exam. Letter sent.  [facility]

## 2023-11-21 DIAGNOSIS — I10 ESSENTIAL HYPERTENSION: ICD-10-CM

## 2023-11-22 RX ORDER — LOSARTAN POTASSIUM 100 MG/1
TABLET ORAL
Qty: 90 TABLET | Refills: 0 | Status: SHIPPED | OUTPATIENT
Start: 2023-11-22

## 2023-11-27 ENCOUNTER — HOSPITAL ENCOUNTER (OUTPATIENT)
Facility: HOSPITAL | Age: 63
Setting detail: RECURRING SERIES
Discharge: HOME | End: 2023-11-30
Payer: MEDICARE

## 2023-11-27 PROCEDURE — 97110 THERAPEUTIC EXERCISES: CPT

## 2023-11-27 PROCEDURE — 97161 PT EVAL LOW COMPLEX 20 MIN: CPT

## 2023-11-27 NOTE — THERAPY EVALUATION
reaction                 []redness - adverse reaction:          [x]  Patient Education billed concurrently with other procedures   [x] Review HEP      Pain Level at end of session (0-10 scale): 0    Plan of Care / Statement of Necessity for Physical Therapy Services     Assessment / key information:  Pt is s/p right shoulder prox humeral fracture  and will benefit from PT to address deficits as noted below in problem list    Evaluation Complexity:  History:  LOW Complexity : Zero comorbidities / personal factors that will impact the outcome / POC; Examination:  LOW Complexity : 1-2 Standardized tests and measures addressing body structure, function, activity limitation and / or participation in recreation  ;Presentation:  LOW Complexity : Stable, uncomplicated  ;Clinical Decision Making:  Other outcome measures clinical judgment  LOW  Overall Complexity Rating: LOW   Problem List: pain affecting function, decrease ROM, decrease strength, decrease ADL/functional abilities, decrease activity tolerance, decrease flexibility/joint mobility, and decrease transfer abilities    Treatment Plan may include any combination of the followin Therapeutic Exercise, 27214 Neuromuscular Re-Education, 54910 Manual Therapy, 88817 Therapeutic Activity, 10565 Self Care/Home Management, 67157 Vasopneumatic Device, and 74254 Gait Training  Patient / Family readiness to learn indicated by: asking questions  Persons(s) to be included in education: patient (P)  Barriers to Learning/Limitations: none  Measures taken if barriers to learning present: n/a  Patient Self Reported Health Status: see pink forms in paper chart  Rehabilitation Potential: excellent    Short Term Goals: To be accomplished in 5 treatments  -Independent in HEP as evidenced on ability to perform at least 5 exercises from HEP using proper form without verbal cuing.   -Pt will be able to reach up into cabinet without pain    Long Term Goals:  To be accomplished in 12

## 2023-11-30 ENCOUNTER — HOSPITAL ENCOUNTER (OUTPATIENT)
Facility: HOSPITAL | Age: 63
Setting detail: RECURRING SERIES
End: 2023-11-30
Payer: MEDICARE

## 2023-11-30 PROCEDURE — 97110 THERAPEUTIC EXERCISES: CPT

## 2023-11-30 PROCEDURE — 97140 MANUAL THERAPY 1/> REGIONS: CPT

## 2023-11-30 NOTE — PROGRESS NOTES
PHYSICAL THERAPY - DAILY TREATMENT NOTE (updated 3/23)      Date: 2023          Patient Name:  Claudean Grave :  1960   Medical   Diagnosis:  Right shoulder pain [M25.511] Treatment Diagnosis:  M25.511  RIGHT SHOULDER PAIN    Referral Source:  Aditya Zavala MD Insurance:   Payor: Roro Varghese / Plan: [employer] HMO / Product Type: Medicare /                     Patient  verified yes     Visit #   Current  / Total 2 24   Time   In / Out 7:20 AM 8:05 AM   Total Treatment Time 45   Total Timed Codes 45         SUBJECTIVE    Pain Level (0-10 scale): 0    Any medication changes, allergies to medications, adverse drug reactions, diagnosis change, or new procedure performed?: [x] No    [] Yes (see summary sheet for update)  Medications: Verified on Patient Summary List    Subjective functional status/changes:     Patient reports she is starting PT for her wrist on today. States her shoulder is overall doing quite well. Minimal reports of pain in her R shoulder however if she does too much with her wrist/hand it will let her know. Patient reports she is leaving for a trip tomorrow on a 8 day cruise to [facility]. OBJECTIVE      Therapeutic Procedures: Tx Min Billable or 1:1 Min (if diff from Tx Min) Procedure, Rationale, Specifics   15  33230 Therapeutic Exercise (timed):  increase ROM, strength, coordination, balance, and proprioception to improve patient's ability to progress to PLOF and address remaining functional goals. (see flow sheet as applicable)     Details if applicable:     30  48804 Manual Therapy (timed):  decrease pain, increase ROM, and increase tissue extensibility to improve patient's ability to progress to PLOF and address remaining functional goals. The manual therapy interventions were performed at a separate and distinct time from the therapeutic activities interventions .  (see flow sheet as applicable)     Details if applicable:  PROM R shoulder all motions to

## 2023-12-11 ENCOUNTER — HOSPITAL ENCOUNTER (OUTPATIENT)
Facility: HOSPITAL | Age: 63
Setting detail: RECURRING SERIES
Discharge: HOME | End: 2023-12-14
Payer: MEDICARE

## 2023-12-11 PROCEDURE — 97110 THERAPEUTIC EXERCISES: CPT

## 2023-12-11 PROCEDURE — 97140 MANUAL THERAPY 1/> REGIONS: CPT

## 2023-12-11 NOTE — PROGRESS NOTES
PHYSICAL THERAPY - DAILY TREATMENT NOTE (updated 3/23)      Date: 2023          Patient Name:  Jeremy Torres :  1960   Medical   Diagnosis:  Right shoulder pain [M25.511] Treatment Diagnosis:  M25.511  RIGHT SHOULDER PAIN    Referral Source:  Corazon Madera MD Insurance:   Payor: Garrett Guard / [facility] [employer] [facility] / Product Type: Medicare /                     Patient  verified yes     Visit #   Current  / Total 3 24   Time   In / Out 1100 A 1125 A   Total Treatment Time 25   Total Timed Codes 25         SUBJECTIVE    Pain Level (0-10 scale): 0    Any medication changes, allergies to medications, adverse drug reactions, diagnosis change, or new procedure performed?: [x] No    [] Yes (see summary sheet for update)  Medications: Verified on Patient Summary List    Subjective functional status/changes:     Patient reports she went to 1 session of wrist therapy but they said she can't continue there until she is done with her shoulder due to insurance restrictions. Wrist bothering her more than her shoulder. OBJECTIVE      Therapeutic Procedures: Tx Min Billable or 1:1 Min (if diff from Tx Min) Procedure, Rationale, Specifics   10  06155 Therapeutic Exercise (timed):  increase ROM, strength, coordination, balance, and proprioception to improve patient's ability to progress to PLOF and address remaining functional goals. (see flow sheet as applicable)     Details if applicable:     15  75890 Manual Therapy (timed):  decrease pain, increase ROM, and increase tissue extensibility to improve patient's ability to progress to PLOF and address remaining functional goals. The manual therapy interventions were performed at a separate and distinct time from the therapeutic activities interventions . (see flow sheet as applicable)     Details if applicable:  PROM R shoulder all motions to tolerance, MFR R biceps , infraspinatus, teres major and teres minor.    25     Total Total

## 2023-12-13 RX ORDER — ROSUVASTATIN CALCIUM 5 MG/1
5 TABLET, COATED ORAL DAILY
Qty: 90 TABLET | Refills: 1 | OUTPATIENT
Start: 2023-12-13

## 2023-12-18 ENCOUNTER — APPOINTMENT (OUTPATIENT)
Facility: HOSPITAL | Age: 63
End: 2023-12-18
Payer: MEDICARE

## 2023-12-22 ENCOUNTER — APPOINTMENT (OUTPATIENT)
Facility: HOSPITAL | Age: 63
End: 2023-12-22
Payer: MEDICARE

## 2023-12-26 ENCOUNTER — APPOINTMENT (OUTPATIENT)
Facility: HOSPITAL | Age: 63
End: 2023-12-26
Payer: MEDICARE

## 2023-12-29 ENCOUNTER — APPOINTMENT (OUTPATIENT)
Facility: HOSPITAL | Age: 63
End: 2023-12-29
Payer: MEDICARE

## 2023-12-29 RX ORDER — ROSUVASTATIN CALCIUM 5 MG/1
5 TABLET, COATED ORAL DAILY
Qty: 90 TABLET | Refills: 1 | OUTPATIENT
Start: 2023-12-29

## 2024-01-02 ENCOUNTER — TELEPHONE (OUTPATIENT)
Age: 64
End: 2024-01-02

## 2024-01-02 NOTE — TELEPHONE ENCOUNTER
Received refill request from [facility] for Fluoxetine 20 MG tablet, Losartan 100 MG tablet, Rosuvastatin 20 MG tablet.  Left voice message for patient to contact the office to confirm pharmacy change.

## 2024-01-03 ENCOUNTER — TELEPHONE (OUTPATIENT)
Age: 64
End: 2024-01-03

## 2024-01-03 DIAGNOSIS — F32.5 MAJOR DEPRESSIVE DISORDER, SINGLE EPISODE, IN FULL REMISSION (HCC): ICD-10-CM

## 2024-01-03 DIAGNOSIS — E78.5 HYPERLIPIDEMIA LDL GOAL <100: Primary | ICD-10-CM

## 2024-01-03 DIAGNOSIS — I10 ESSENTIAL HYPERTENSION: ICD-10-CM

## 2024-01-03 RX ORDER — LOSARTAN POTASSIUM 100 MG/1
100 TABLET ORAL DAILY
Qty: 90 TABLET | Refills: 0 | Status: SHIPPED | OUTPATIENT
Start: 2024-01-03

## 2024-01-03 RX ORDER — FLUOXETINE HYDROCHLORIDE 20 MG/1
CAPSULE ORAL
Qty: 90 CAPSULE | Refills: 0 | Status: SHIPPED | OUTPATIENT
Start: 2024-01-03

## 2024-01-03 RX ORDER — ROSUVASTATIN CALCIUM 5 MG/1
5 TABLET, COATED ORAL DAILY
Qty: 90 TABLET | Refills: 0 | Status: SHIPPED | OUTPATIENT
Start: 2024-01-03

## 2024-01-03 NOTE — TELEPHONE ENCOUNTER
Patient has changed pharmacies.  She is requesting new prescriptions be sent to [facility].      Medication Refill Request    Adrienne Yeung is requesting a refill of the following medication(s):     Fluoxetine (Prozac) 20 MG capsule  Losartan (Cozaar) 100 MG tablet  Rosuvastatin (Crestor) 5 MG tablet    Please send refill to:     [facility] - [facility] [address] - P [phone] - F [phone]

## 2024-01-11 ENCOUNTER — TELEPHONE (OUTPATIENT)
Age: 64
End: 2024-01-11

## 2024-01-25 DIAGNOSIS — F32.5 MAJOR DEPRESSIVE DISORDER, SINGLE EPISODE, IN FULL REMISSION (HCC): ICD-10-CM

## 2024-01-25 RX ORDER — FLUOXETINE HYDROCHLORIDE 20 MG/1
CAPSULE ORAL
Qty: 90 CAPSULE | Refills: 3 | OUTPATIENT
Start: 2024-01-25

## 2024-02-17 DIAGNOSIS — I10 ESSENTIAL HYPERTENSION: ICD-10-CM

## 2024-02-19 RX ORDER — LOSARTAN POTASSIUM 100 MG/1
100 TABLET ORAL DAILY
Qty: 90 TABLET | Refills: 0 | OUTPATIENT
Start: 2024-02-19

## 2024-02-27 ENCOUNTER — OFFICE VISIT (OUTPATIENT)
Age: 64
End: 2024-02-27
Payer: MEDICARE

## 2024-02-27 VITALS
HEIGHT: 61 IN | SYSTOLIC BLOOD PRESSURE: 136 MMHG | BODY MASS INDEX: 23.11 KG/M2 | WEIGHT: 122.4 LBS | OXYGEN SATURATION: 99 % | HEART RATE: 76 BPM | DIASTOLIC BLOOD PRESSURE: 75 MMHG

## 2024-02-27 DIAGNOSIS — G47.33 OSA (OBSTRUCTIVE SLEEP APNEA): Primary | ICD-10-CM

## 2024-02-27 PROCEDURE — 99203 OFFICE O/P NEW LOW 30 MIN: CPT | Performed by: SPECIALIST

## 2024-02-27 PROCEDURE — G8482 FLU IMMUNIZE ORDER/ADMIN: HCPCS | Performed by: SPECIALIST

## 2024-02-27 PROCEDURE — 3075F SYST BP GE 130 - 139MM HG: CPT | Performed by: SPECIALIST

## 2024-02-27 PROCEDURE — 1123F ACP DISCUSS/DSCN MKR DOCD: CPT | Performed by: SPECIALIST

## 2024-02-27 PROCEDURE — 3078F DIAST BP <80 MM HG: CPT | Performed by: SPECIALIST

## 2024-02-27 PROCEDURE — 1090F PRES/ABSN URINE INCON ASSESS: CPT | Performed by: SPECIALIST

## 2024-02-27 PROCEDURE — 1036F TOBACCO NON-USER: CPT | Performed by: SPECIALIST

## 2024-02-27 PROCEDURE — G8427 DOCREV CUR MEDS BY ELIG CLIN: HCPCS | Performed by: SPECIALIST

## 2024-02-27 PROCEDURE — 3017F COLORECTAL CA SCREEN DOC REV: CPT | Performed by: SPECIALIST

## 2024-02-27 PROCEDURE — G8399 PT W/DXA RESULTS DOCUMENT: HCPCS | Performed by: SPECIALIST

## 2024-02-27 PROCEDURE — G8420 CALC BMI NORM PARAMETERS: HCPCS | Performed by: SPECIALIST

## 2024-02-27 ASSESSMENT — SLEEP AND FATIGUE QUESTIONNAIRES
HOW LIKELY ARE YOU TO NOD OFF OR FALL ASLEEP WHEN YOU ARE A PASSENGER IN A CAR FOR AN HOUR WITHOUT A BREAK: 2
HOW LIKELY ARE YOU TO NOD OFF OR FALL ASLEEP IN A CAR, WHILE STOPPED FOR A FEW MINUTES IN TRAFFIC: 0
HOW LIKELY ARE YOU TO NOD OFF OR FALL ASLEEP WHILE WATCHING TV: 0
ESS TOTAL SCORE: 4
HOW LIKELY ARE YOU TO NOD OFF OR FALL ASLEEP WHILE SITTING AND TALKING TO SOMEONE: 0
HOW LIKELY ARE YOU TO NOD OFF OR FALL ASLEEP WHILE SITTING AND READING: 1
NECK CIRCUMFERENCE (INCHES): 13.5
HOW LIKELY ARE YOU TO NOD OFF OR FALL ASLEEP WHILE LYING DOWN TO REST IN THE AFTERNOON WHEN CIRCUMSTANCES PERMIT: 0
HOW LIKELY ARE YOU TO NOD OFF OR FALL ASLEEP WHILE SITTING QUIETLY AFTER LUNCH WITHOUT ALCOHOL: 0
HOW LIKELY ARE YOU TO NOD OFF OR FALL ASLEEP WHILE SITTING INACTIVE IN A PUBLIC PLACE: 1

## 2024-02-27 NOTE — PROGRESS NOTES
Patient has lost about 100lbs within the last year not sure if she still needs PAP device  Sleep pattern has changed  Patient has tried melatonin, THC, benedryl, and white wine but none of that worked. She falls asleep but cannot stay asleep. She will still wake up 2 am and up for the remainder of the day.    
  Neck circumference (Inches) 13.5        Allergies   Allergen Reactions    Atenolol Other (See Comments)     depression    Acetaminophen Nausea And Vomiting    Hydrocodone-Acetaminophen Nausea And Vomiting         Current Outpatient Medications:     FLUoxetine (PROZAC) 20 MG capsule, TAKE 3 CAPSULES BY MOUTH EVERY DAY, Disp: 90 capsule, Rfl: 0    losartan (COZAAR) 100 MG tablet, Take 1 tablet by mouth daily, Disp: 90 tablet, Rfl: 0    rosuvastatin (CRESTOR) 5 MG tablet, Take 1 tablet by mouth daily, Disp: 90 tablet, Rfl: 0    metFORMIN (GLUCOPHAGE-XR) 500 MG extended release tablet, Take 2 tablets by mouth 2 times daily, Disp: 180 tablet, Rfl: 1    aspirin 81 MG EC tablet, 1 tab(s) orally once a day for 30 day(s), Disp: , Rfl:     Cetirizine HCl (ZYRTEC ALLERGY) 10 MG CAPS, 1 tab(s) chewed once a day, Disp: , Rfl:     Multiple Vitamins-Minerals (BARIATRIC MULTIVITAMINS/IRON PO), Take 1 tablet by mouth daily, Disp: , Rfl:     diclofenac (FLECTOR) 1.3 % PTCH patch, Place 1 patch onto the skin in the morning and 1 patch in the evening., Disp: , Rfl:     acetaminophen (TYLENOL) 500 MG tablet, Take 2 tablets by mouth every 6 hours as needed, Disp: , Rfl:     calcium citrate (CALCITRATE) 950 (200 Ca) MG tablet, Take 2 tablets by mouth 2 times daily, Disp: , Rfl:     fluticasone (FLONASE) 50 MCG/ACT nasal spray, 2 sprays by Nasal route daily, Disp: , Rfl:     ketotifen (ZADITOR) 0.025 % ophthalmic solution, Apply 1 drop to eye as needed, Disp: , Rfl:     triamcinolone (ARISTOCORT) 0.5 % cream, Apply topically 2 times daily, Disp: , Rfl:     Continuous Blood Gluc Sensor (DEXCOM G7 SENSOR) MISC, APPLY 1 SENSOR UNDER THE SKIN EVERY 10 DAYS 90 DAYS (Patient not taking: Reported on 2/27/2024), Disp: , Rfl:     Polyethylene Glycol 3350 POWD, TAKE 17 G BY MOUTH DAILY FOR 30 DAYS. (Patient not taking: Reported on 2/27/2024), Disp: , Rfl:     methocarbamol (ROBAXIN) 500 MG tablet, Take 1.5 tablets by mouth in the morning and 1.5

## 2024-02-28 ENCOUNTER — HOSPITAL ENCOUNTER (OUTPATIENT)
Facility: HOSPITAL | Age: 64
Discharge: HOME | End: 2024-03-02
Attending: SPECIALIST
Payer: MEDICARE

## 2024-02-28 ENCOUNTER — OFFICE VISIT (OUTPATIENT)
Age: 64
End: 2024-02-28
Payer: MEDICARE

## 2024-02-28 VITALS
WEIGHT: 120.4 LBS | DIASTOLIC BLOOD PRESSURE: 71 MMHG | RESPIRATION RATE: 16 BRPM | SYSTOLIC BLOOD PRESSURE: 127 MMHG | TEMPERATURE: 97.5 F | OXYGEN SATURATION: 99 % | BODY MASS INDEX: 22.73 KG/M2 | HEIGHT: 61 IN | HEART RATE: 80 BPM

## 2024-02-28 DIAGNOSIS — Z98.84 STATUS POST BARIATRIC SURGERY: ICD-10-CM

## 2024-02-28 DIAGNOSIS — E11.29 TYPE 2 DIABETES MELLITUS WITH MICROALBUMINURIA, WITH LONG-TERM CURRENT USE OF INSULIN (HCC): ICD-10-CM

## 2024-02-28 DIAGNOSIS — E78.5 HYPERLIPIDEMIA LDL GOAL <100: ICD-10-CM

## 2024-02-28 DIAGNOSIS — R80.9 TYPE 2 DIABETES MELLITUS WITH MICROALBUMINURIA, WITH LONG-TERM CURRENT USE OF INSULIN (HCC): ICD-10-CM

## 2024-02-28 DIAGNOSIS — Z00.00 WELCOME TO MEDICARE PREVENTIVE VISIT: Primary | ICD-10-CM

## 2024-02-28 DIAGNOSIS — G47.33 OSA (OBSTRUCTIVE SLEEP APNEA): ICD-10-CM

## 2024-02-28 DIAGNOSIS — I10 ESSENTIAL HYPERTENSION: ICD-10-CM

## 2024-02-28 DIAGNOSIS — Z79.4 TYPE 2 DIABETES MELLITUS WITH MICROALBUMINURIA, WITH LONG-TERM CURRENT USE OF INSULIN (HCC): ICD-10-CM

## 2024-02-28 DIAGNOSIS — R22.1 NECK MASS: ICD-10-CM

## 2024-02-28 DIAGNOSIS — F32.5 MAJOR DEPRESSIVE DISORDER, SINGLE EPISODE, IN FULL REMISSION (HCC): ICD-10-CM

## 2024-02-28 DIAGNOSIS — M81.0 AGE-RELATED OSTEOPOROSIS WITHOUT CURRENT PATHOLOGICAL FRACTURE: ICD-10-CM

## 2024-02-28 PROCEDURE — 3017F COLORECTAL CA SCREEN DOC REV: CPT | Performed by: INTERNAL MEDICINE

## 2024-02-28 PROCEDURE — 2022F DILAT RTA XM EVC RTNOPTHY: CPT | Performed by: INTERNAL MEDICINE

## 2024-02-28 PROCEDURE — G8399 PT W/DXA RESULTS DOCUMENT: HCPCS | Performed by: INTERNAL MEDICINE

## 2024-02-28 PROCEDURE — G8482 FLU IMMUNIZE ORDER/ADMIN: HCPCS | Performed by: INTERNAL MEDICINE

## 2024-02-28 PROCEDURE — 3074F SYST BP LT 130 MM HG: CPT | Performed by: INTERNAL MEDICINE

## 2024-02-28 PROCEDURE — G8427 DOCREV CUR MEDS BY ELIG CLIN: HCPCS | Performed by: INTERNAL MEDICINE

## 2024-02-28 PROCEDURE — 3078F DIAST BP <80 MM HG: CPT | Performed by: INTERNAL MEDICINE

## 2024-02-28 PROCEDURE — 1123F ACP DISCUSS/DSCN MKR DOCD: CPT | Performed by: INTERNAL MEDICINE

## 2024-02-28 PROCEDURE — 93005 ELECTROCARDIOGRAM TRACING: CPT | Performed by: INTERNAL MEDICINE

## 2024-02-28 PROCEDURE — 3046F HEMOGLOBIN A1C LEVEL >9.0%: CPT | Performed by: INTERNAL MEDICINE

## 2024-02-28 PROCEDURE — 93010 ELECTROCARDIOGRAM REPORT: CPT | Performed by: INTERNAL MEDICINE

## 2024-02-28 PROCEDURE — 1090F PRES/ABSN URINE INCON ASSESS: CPT | Performed by: INTERNAL MEDICINE

## 2024-02-28 PROCEDURE — G8420 CALC BMI NORM PARAMETERS: HCPCS | Performed by: INTERNAL MEDICINE

## 2024-02-28 PROCEDURE — 99214 OFFICE O/P EST MOD 30 MIN: CPT | Performed by: INTERNAL MEDICINE

## 2024-02-28 PROCEDURE — 1036F TOBACCO NON-USER: CPT | Performed by: INTERNAL MEDICINE

## 2024-02-28 PROCEDURE — G0402 INITIAL PREVENTIVE EXAM: HCPCS | Performed by: INTERNAL MEDICINE

## 2024-02-28 PROCEDURE — 95811 POLYSOM 6/>YRS CPAP 4/> PARM: CPT | Performed by: SPECIALIST

## 2024-02-28 RX ORDER — FLUOXETINE HYDROCHLORIDE 20 MG/1
CAPSULE ORAL
Qty: 270 CAPSULE | Refills: 1 | Status: SHIPPED | OUTPATIENT
Start: 2024-02-28

## 2024-02-28 RX ORDER — METFORMIN HYDROCHLORIDE 500 MG/1
1000 TABLET, EXTENDED RELEASE ORAL 2 TIMES DAILY
COMMUNITY
Start: 2024-02-28

## 2024-02-28 ASSESSMENT — ENCOUNTER SYMPTOMS
COUGH: 0
BACK PAIN: 0
SHORTNESS OF BREATH: 0
DIARRHEA: 0
NAUSEA: 0
SORE THROAT: 0
BLOOD IN STOOL: 0
CONSTIPATION: 0
ABDOMINAL PAIN: 0

## 2024-02-28 ASSESSMENT — PATIENT HEALTH QUESTIONNAIRE - PHQ9
SUM OF ALL RESPONSES TO PHQ9 QUESTIONS 1 & 2: 1
4. FEELING TIRED OR HAVING LITTLE ENERGY: 0
SUM OF ALL RESPONSES TO PHQ QUESTIONS 1-9: 4
9. THOUGHTS THAT YOU WOULD BE BETTER OFF DEAD, OR OF HURTING YOURSELF: 0
7. TROUBLE CONCENTRATING ON THINGS, SUCH AS READING THE NEWSPAPER OR WATCHING TELEVISION: 0
1. LITTLE INTEREST OR PLEASURE IN DOING THINGS: 0
SUM OF ALL RESPONSES TO PHQ QUESTIONS 1-9: 4
3. TROUBLE FALLING OR STAYING ASLEEP: 3
6. FEELING BAD ABOUT YOURSELF - OR THAT YOU ARE A FAILURE OR HAVE LET YOURSELF OR YOUR FAMILY DOWN: 0
5. POOR APPETITE OR OVEREATING: 0
10. IF YOU CHECKED OFF ANY PROBLEMS, HOW DIFFICULT HAVE THESE PROBLEMS MADE IT FOR YOU TO DO YOUR WORK, TAKE CARE OF THINGS AT HOME, OR GET ALONG WITH OTHER PEOPLE: 0
2. FEELING DOWN, DEPRESSED OR HOPELESS: 1
8. MOVING OR SPEAKING SO SLOWLY THAT OTHER PEOPLE COULD HAVE NOTICED. OR THE OPPOSITE, BEING SO FIGETY OR RESTLESS THAT YOU HAVE BEEN MOVING AROUND A LOT MORE THAN USUAL: 0

## 2024-02-28 ASSESSMENT — LIFESTYLE VARIABLES
HOW MANY STANDARD DRINKS CONTAINING ALCOHOL DO YOU HAVE ON A TYPICAL DAY: 1 OR 2
HOW OFTEN DO YOU HAVE A DRINK CONTAINING ALCOHOL: MONTHLY OR LESS

## 2024-02-28 NOTE — PROGRESS NOTES
Medicare Annual Wellness Visit    Adrienne Yeung is here for Medicare AWV    Assessment & Plan   Welcome to Medicare preventive visit  Health maintenance reviewed and updated with patient today at visit.    -     AMB POC EKG ROUTINE  Essential hypertension  Assessment & Plan:   Well-controlled, continue current medications  Orders:  -     AMB POC EKG ROUTINE  Major depressive disorder, single episode, in full remission (HCC)  Assessment & Plan:   Well-controlled, continue current medications  Orders:  -     FLUoxetine (PROZAC) 20 MG capsule; TAKE 3 CAPSULES BY MOUTH EVERY DAY, Disp-270 capsule, R-1Normal  Type 2 diabetes mellitus with microalbuminuria, with long-term current use of insulin ([facility])  Assessment & Plan:   Monitored by specialist- no acute findings meriting change in the plan  Orders:  -     Lipid Panel; Future  -     Microalbumin / Creatinine Urine Ratio; Future  -     Comprehensive Metabolic Panel; Future  -     Hemoglobin A1C; Future  -     Iron and TIBC; Future  -     Ferritin; Future  Hyperlipidemia LDL goal <100  -     Lipid Panel; Future  -     Comprehensive Metabolic Panel; Future  -     CBC with Auto Differential; Future  Status post bariatric surgery  Assessment & Plan:   Doing well and has had lost an additional 25 pounds since August 2023.  Total weight loss since surgery is 100 pounds.  Orders:  -     Vitamin D 25 Hydroxy; Future  -     Vitamin B12; Future  -     Iron and TIBC; Future  -     Ferritin; Future  Age-related osteoporosis without current pathological fracture  -     Vitamin D 25 Hydroxy; Future  -     DEXA BONE DENSITY AXIAL SKELETON; Future  Neck mass  -     US HEAD NECK SOFT TISSUE THYROID; Future  No extremity weakness she is going to continue to work with her  and I encouraged to increase her protein intake.  Recommendations for Preventive Services Due: see orders and patient instructions/AVS.  Recommended screening schedule for the next 5-10 years is provided

## 2024-02-28 NOTE — ACP (ADVANCE CARE PLANNING)
End of life planning discussed with patient.  Patient states  does have an advance directive and will provide a copy to scan to chart.

## 2024-02-28 NOTE — PATIENT INSTRUCTIONS
and safety. Be sure to make and go to all appointments, and call your doctor if you are having problems. It's also a good idea to know your test results and keep a list of the medicines you take.  How can you care for yourself at home?  Diet    Use less salt when you cook and eat. This helps lower your blood pressure. Taste food before salting. Add only a little salt when you think you need it. With time, your taste buds will adjust to less salt.     Eat fewer snack items, fast foods, canned soups, and other high-salt, high-fat, processed foods.     Read food labels and try to avoid saturated and trans fats. They increase your risk of heart disease by raising cholesterol levels.     Limit the amount of solid fat-butter, margarine, and shortening-you eat. Use olive, peanut, or canola oil when you cook. Bake, broil, and steam foods instead of frying them.     Eat a variety of fruit and vegetables every day. Dark green, deep orange, red, or yellow fruits and vegetables are especially good for you. Examples include spinach, carrots, peaches, and berries.     Foods high in fiber can reduce your cholesterol and provide important vitamins and minerals. High-fiber foods include whole-grain cereals and breads, oatmeal, beans, brown rice, citrus fruits, and apples.     Eat lean proteins. Heart-healthy proteins include seafood, lean meats and poultry, eggs, beans, peas, nuts, seeds, and soy products.     Limit drinks and foods with added sugar. These include candy, desserts, and soda pop.   Lifestyle changes    If your doctor recommends it, get more exercise. Walking is a good choice. Bit by bit, increase the amount you walk every day. Try for at least 30 minutes on most days of the week. You also may want to swim, bike, or do other activities.     Do not smoke. If you need help quitting, talk to your doctor about stop-smoking programs and medicines. These can increase your chances of quitting for good. Quitting smoking may be

## 2024-02-29 ENCOUNTER — HOSPITAL ENCOUNTER (OUTPATIENT)
Age: 64
Discharge: HOME | End: 2024-02-29
Payer: MEDICARE

## 2024-02-29 VITALS
OXYGEN SATURATION: 98 % | SYSTOLIC BLOOD PRESSURE: 120 MMHG | TEMPERATURE: 97.2 F | RESPIRATION RATE: 12 BRPM | HEIGHT: 61 IN | BODY MASS INDEX: 22.66 KG/M2 | WEIGHT: 120 LBS | DIASTOLIC BLOOD PRESSURE: 70 MMHG | HEART RATE: 66 BPM

## 2024-02-29 DIAGNOSIS — R22.1 NECK MASS: ICD-10-CM

## 2024-02-29 PROCEDURE — 76536 US EXAM OF HEAD AND NECK: CPT

## 2024-02-29 NOTE — PROGRESS NOTES
Sleep Study Technical Notes   Disclaimer:  all notes have not been confirmed by interpreting physician      PRE-Test:  Adrienne Yeung (: 1960) arrived in the lobby. The patient was taken to the Sleep Center and taken directly to his/her room.   Procedure explained to the patient and questions were answered. The patient expressed understanding of the procedure. Electrodes were applied without incident. The patient was placed in bed and the study was started.    Acquisition Notes:  Lights off: 9:29pm    Respiratory events:   A__Y    H__Y  C__Y  M__N  ECG:    Significant arrhythmias:   N  Snoring:  Mild snore   Sleep Stages:  REM   Y    Titration type: CPAP / BIPAP TITRATION  PAP titration information in study flow sheet if applicable  Positional therapy with:   Patient slept with positional therapy:  Y used  2 pillows  Patient slept with head of bed elevated:  N  Supine sleep assessed per physician order:  N.     Patient wore an oral appliance:  N  Other comments: PT slept well in supine and left side, Sleep stage 1,2,REM sleep noted, apnea and mild snoring noted, BIPAP 23/19cm  PT had no apnea and no snore, BIPAP 23/19 appear to be best setting.  Patient had caregiver in attendance:  N  Patient watched TV or on phone after lights out for ** hours  Patient to bathroom 2 times  Pediatric Patient:  Parent accompanied patient: N  Parent slept in bed with patient: N      POST Test:  Patient was awakened.  Electrodes were removed.    The patient was discharged after answering the Post Questionnaire.    Equipment and room cleaned per infection control policy.

## 2024-03-01 LAB — HBA1C MFR BLD HPLC: 6 %

## 2024-03-05 ENCOUNTER — PATIENT MESSAGE (OUTPATIENT)
Age: 64
End: 2024-03-05

## 2024-03-05 DIAGNOSIS — R22.1 NECK MASS: Primary | ICD-10-CM

## 2024-03-05 NOTE — TELEPHONE ENCOUNTER
Poly, Processor 3/5/2024 10:51 AM EST    Not sure if I mentioned this to you…. Did a sleep study at [facility] last week. Weird results that indicated CPAP pressure of 23 needed. Didn’t expect that. Am wondering if this neck mass may be causing breathing issues. Thanks, Karsten.

## 2024-03-07 ENCOUNTER — HOSPITAL ENCOUNTER (OUTPATIENT)
Facility: HOSPITAL | Age: 64
Discharge: HOME | End: 2024-03-07
Attending: INTERNAL MEDICINE
Payer: MEDICARE

## 2024-03-07 DIAGNOSIS — R22.1 NECK MASS: ICD-10-CM

## 2024-03-07 PROCEDURE — 70490 CT SOFT TISSUE NECK W/O DYE: CPT

## 2024-03-09 ENCOUNTER — TELEPHONE (OUTPATIENT)
Age: 64
End: 2024-03-09

## 2024-03-09 NOTE — TELEPHONE ENCOUNTER
Sleep study performed for potential sleep disordered breathing.    During initial portion of the study: 172.2 minutes recorded at which 89.5 minutes spent asleep with a sleep efficiency of 52%.  Sleep onset at 20.8 minutes; REM not observed.  99 respiratory events occurred of which 5 hypopnea and 94 apnea (3 central, 91 obstructive).  AHI 66.4/h.  Patient primarily supine.  Minimal SpO2 81%.    During the second portion of the study CPAP and BiPAP employed.  273.8 minutes recorded of which 178.5 minutes of sleep with a sleep efficiency of 65.2%.  Sleep onset at 10.6 minutes; REM onset at 104 minutes with total REM representing 23.8% of sleep time.    100 respiratory events occurred over 3 hypopnea and 97 apnea (4 central, 3 mixed, 90 obstructive.  Overall AHI 33.6/h.Events primarily supine.  Minimal SpO2 90%.    CPAP initiated at 5 cm increased to 15 cm.  BiPAP at 17/13 cm increased to 23/19 cm.  At BiPAP of 23/19 cm: 37.7 minutes recorded which 35.7 minutes of sleep.  REM not observed.  AHI 0.  REM observed at BiPAP of 20/16 cm: 97.1 minutes recorded which 47.2 minutes of sleep and 40 minutes in rem.  AHI 11.4/h.    ResMed AirFit F30 I (S)    Impression: Severe sleep disordered breathing improving with BiPAP increased to 23/19 cm.    Recommend: Auto BiPAP: EPAP 16 cm, pressure support 4 cm, IPAP 24 cm    Sleep technologist: Please advise patient of study results.  Order has been entered for auto BiPAP.  Please schedule compliance appointment.

## 2024-03-13 DIAGNOSIS — R22.1 NECK MASS: Primary | ICD-10-CM

## 2024-03-13 NOTE — PROGRESS NOTES
Reviewed findings of neck ultrasound and CT scan with Dr. Paz radiology.  He did not see any concerning findings at the site of the palpable nodule and feels it is likely a prominent cervical spine process and previous cervical spine surgery.  He does recommend that we consider doing a contrasted CT to look at the deeper tissues.  Contrasted CT ordered and patient notified via [employer].

## 2024-03-17 DIAGNOSIS — I10 ESSENTIAL HYPERTENSION: ICD-10-CM

## 2024-03-18 RX ORDER — LOSARTAN POTASSIUM 100 MG/1
100 TABLET ORAL DAILY
Qty: 90 TABLET | Refills: 0 | Status: SHIPPED | OUTPATIENT
Start: 2024-03-18

## 2024-03-20 ENCOUNTER — HOSPITAL ENCOUNTER (OUTPATIENT)
Age: 64
Discharge: HOME | End: 2024-03-23
Payer: MEDICARE

## 2024-03-20 DIAGNOSIS — R22.1 NECK MASS: ICD-10-CM

## 2024-03-20 PROCEDURE — 6360000004 HC RX CONTRAST MEDICATION: Performed by: RADIOLOGY

## 2024-03-20 PROCEDURE — 70491 CT SOFT TISSUE NECK W/DYE: CPT

## 2024-03-20 RX ADMIN — IOPAMIDOL 100 ML: 755 INJECTION, SOLUTION INTRAVENOUS at 08:09

## 2024-03-28 ENCOUNTER — TELEPHONE (OUTPATIENT)
Age: 64
End: 2024-03-28

## 2024-03-28 NOTE — TELEPHONE ENCOUNTER
Reason for call:  pt states she sent a MCM to [facility] regarding labs and she forgot to say that her iron was low and was put on an iron supplement     Is this a new problem: Yes    Date of last appointment:  2/28/2024     Can we respond via [employer] No    Best call back number:   Adrienne Yeung \"Karsten\" (Self) [phone] (Home)

## 2024-04-02 DIAGNOSIS — F32.5 MAJOR DEPRESSIVE DISORDER, SINGLE EPISODE, IN FULL REMISSION (HCC): ICD-10-CM

## 2024-04-02 RX ORDER — FLUOXETINE HYDROCHLORIDE 20 MG/1
CAPSULE ORAL
Qty: 90 CAPSULE | Refills: 1 | Status: SHIPPED | OUTPATIENT
Start: 2024-04-02

## 2024-04-02 NOTE — TELEPHONE ENCOUNTER
Chief Complaint   Patient presents with    Medication Refill     Last Appointment with Dr. Isabel Tolentino 2/28/24     Future Appointments   Date Time Provider Department Center   8/28/2024 10:45 AM Isabel Tolentino MD [facility]

## 2024-04-03 ENCOUNTER — TELEPHONE (OUTPATIENT)
Age: 64
End: 2024-04-03

## 2024-04-03 ENCOUNTER — CLINICAL DOCUMENTATION (OUTPATIENT)
Age: 64
End: 2024-04-03

## 2024-04-03 DIAGNOSIS — G47.33 OSA (OBSTRUCTIVE SLEEP APNEA): Primary | ICD-10-CM

## 2024-04-03 NOTE — TELEPHONE ENCOUNTER
Auto BiPAP order:     Recommend: Auto BiPAP: EPAP 16 cm, pressure support 4 cm, IPAP 24 cm                         ResMed AirFit F30 I (S)    Compliance visit will be scheduled.

## 2024-05-14 ENCOUNTER — CLINICAL DOCUMENTATION (OUTPATIENT)
Age: 64
End: 2024-05-14

## 2024-05-14 ENCOUNTER — PATIENT MESSAGE (OUTPATIENT)
Age: 64
End: 2024-05-14

## 2024-05-14 NOTE — TELEPHONE ENCOUNTER
From: Adrienne Yeung  To: Dr. Filiberto Workman  Sent: 5/14/2024 8:22 AM EDT  Subject: BiPAP Pressure Reduction    I haven’t been able to adjust to the pressure setting. Have extended ramp up time, increased humidity. Can we reduce the pressure some? Thanks!

## 2024-05-14 NOTE — PROGRESS NOTES
Patient requested reduction in pressure.  EPAP will be reduced to 12 cm; pressure support, max IPAP continue unchanged.    Compliance review in 2 weeks.

## 2024-05-24 DIAGNOSIS — F32.5 MAJOR DEPRESSIVE DISORDER, SINGLE EPISODE, IN FULL REMISSION (HCC): ICD-10-CM

## 2024-05-24 RX ORDER — FLUOXETINE HYDROCHLORIDE 20 MG/1
CAPSULE ORAL
Qty: 270 CAPSULE | Refills: 1 | Status: SHIPPED | OUTPATIENT
Start: 2024-05-24

## 2024-05-30 DIAGNOSIS — I10 ESSENTIAL HYPERTENSION: ICD-10-CM

## 2024-05-30 RX ORDER — LOSARTAN POTASSIUM 100 MG/1
100 TABLET ORAL DAILY
Qty: 90 TABLET | Refills: 0 | Status: SHIPPED | OUTPATIENT
Start: 2024-05-30

## 2024-06-26 ENCOUNTER — TELEMEDICINE (OUTPATIENT)
Age: 64
End: 2024-06-26
Payer: MEDICARE

## 2024-06-26 ENCOUNTER — CLINICAL DOCUMENTATION (OUTPATIENT)
Age: 64
End: 2024-06-26

## 2024-06-26 DIAGNOSIS — G47.33 OSA (OBSTRUCTIVE SLEEP APNEA): Primary | ICD-10-CM

## 2024-06-26 DIAGNOSIS — I10 PRIMARY HYPERTENSION: ICD-10-CM

## 2024-06-26 PROCEDURE — 1036F TOBACCO NON-USER: CPT | Performed by: NURSE PRACTITIONER

## 2024-06-26 PROCEDURE — G8420 CALC BMI NORM PARAMETERS: HCPCS | Performed by: NURSE PRACTITIONER

## 2024-06-26 PROCEDURE — 99213 OFFICE O/P EST LOW 20 MIN: CPT | Performed by: NURSE PRACTITIONER

## 2024-06-26 PROCEDURE — 1123F ACP DISCUSS/DSCN MKR DOCD: CPT | Performed by: NURSE PRACTITIONER

## 2024-06-26 PROCEDURE — 1090F PRES/ABSN URINE INCON ASSESS: CPT | Performed by: NURSE PRACTITIONER

## 2024-06-26 PROCEDURE — 3017F COLORECTAL CA SCREEN DOC REV: CPT | Performed by: NURSE PRACTITIONER

## 2024-06-26 PROCEDURE — G8427 DOCREV CUR MEDS BY ELIG CLIN: HCPCS | Performed by: NURSE PRACTITIONER

## 2024-06-26 PROCEDURE — G8399 PT W/DXA RESULTS DOCUMENT: HCPCS | Performed by: NURSE PRACTITIONER

## 2024-06-26 ASSESSMENT — SLEEP AND FATIGUE QUESTIONNAIRES
HOW LIKELY ARE YOU TO NOD OFF OR FALL ASLEEP WHILE SITTING AND TALKING TO SOMEONE: WOULD NEVER DOZE
HOW LIKELY ARE YOU TO NOD OFF OR FALL ASLEEP WHILE SITTING QUIETLY AFTER LUNCH WITHOUT ALCOHOL: WOULD NEVER DOZE
HOW LIKELY ARE YOU TO NOD OFF OR FALL ASLEEP IN A CAR, WHILE STOPPED FOR A FEW MINUTES IN TRAFFIC: WOULD NEVER DOZE
HOW LIKELY ARE YOU TO NOD OFF OR FALL ASLEEP WHILE SITTING INACTIVE IN A PUBLIC PLACE: SLIGHT CHANCE OF DOZING
DO YOU HAVE DIFFICULTY BEING AS ACTIVE AS YOU WANT TO BE IN THE MORNING BECAUSE YOU ARE SLEEPY OR TIRED: NO
HOW LIKELY ARE YOU TO NOD OFF OR FALL ASLEEP WHILE SITTING INACTIVE IN A PUBLIC PLACE: SLIGHT CHANCE OF DOZING
FOSQ SCORE: 17.5
HOW LIKELY ARE YOU TO NOD OFF OR FALL ASLEEP WHILE SITTING QUIETLY AFTER LUNCH WITHOUT ALCOHOL: WOULD NEVER DOZE
HOW LIKELY ARE YOU TO NOD OFF OR FALL ASLEEP WHEN YOU ARE A PASSENGER IN A CAR FOR AN HOUR WITHOUT A BREAK: SLIGHT CHANCE OF DOZING
HOW LIKELY ARE YOU TO NOD OFF OR FALL ASLEEP IN A CAR, WHILE STOPPED FOR A FEW MINUTES IN TRAFFIC: WOULD NEVER DOZE
HAS YOUR MOOD BEEN AFFECTED BECAUSE YOU ARE SLEEPY OR TIRED: YES, MODERATE
DO YOU HAVE DIFFICULTY VISITING YOUR FAMILY OR FRIENDS IN THEIR HOME BECAUSE YOU BECOME SLEEPY OR TIRED: YES, A LITTLE
HOW LIKELY ARE YOU TO NOD OFF OR FALL ASLEEP WHILE WATCHING TV: MODERATE CHANCE OF DOZING
DO YOU HAVE DIFFICULTY WATCHING A MOVIE OR VIDEO BECAUSE YOU BECOME SLEEPY OR TIRED: NO
DO YOU HAVE DIFFICULTY CONCENTRATING ON THE THINGS YOU DO BECAUSE YOU ARE SLEEPY OR TIRED: YES, A LITTLE
HOW LIKELY ARE YOU TO NOD OFF OR FALL ASLEEP WHILE SITTING AND READING: MODERATE CHANCE OF DOZING
ESS TOTAL SCORE: 9
DO YOU HAVE DIFFICULTY OPERATING A MOTOR VEHICLE FOR LONG DISTANCES (GREATER THAN 100 MILES) BECAUSE YOU BECOME SLEEPY: NO
HOW LIKELY ARE YOU TO NOD OFF OR FALL ASLEEP WHEN YOU ARE A PASSENGER IN A CAR FOR AN HOUR WITHOUT A BREAK: SLIGHT CHANCE OF DOZING
DO YOU HAVE DIFFICULTY BEING AS ACTIVE AS YOU WANT TO BE IN THE EVENING BECAUSE YOU ARE SLEEPY OR TIRED: NO
HOW LIKELY ARE YOU TO NOD OFF OR FALL ASLEEP WHILE LYING DOWN TO REST IN THE AFTERNOON WHEN CIRCUMSTANCES PERMIT: HIGH CHANCE OF DOZING
HOW LIKELY ARE YOU TO NOD OFF OR FALL ASLEEP WHILE LYING DOWN TO REST IN THE AFTERNOON WHEN CIRCUMSTANCES PERMIT: HIGH CHANCE OF DOZING
HOW LIKELY ARE YOU TO NOD OFF OR FALL ASLEEP WHILE WATCHING TV: MODERATE CHANCE OF DOZING
DO YOU GENERALLY HAVE DIFFICULTY REMEMBERING THINGS BECAUSE YOU ARE SLEEPY OR TIRED: YES, A LITTLE
HOW LIKELY ARE YOU TO NOD OFF OR FALL ASLEEP WHILE SITTING AND TALKING TO SOMEONE: WOULD NEVER DOZE
HOW LIKELY ARE YOU TO NOD OFF OR FALL ASLEEP WHILE SITTING AND READING: MODERATE CHANCE OF DOZING
DO YOU HAVE DIFFICULTY OPERATING A MOTOR VEHICLE FOR SHORT DISTANCES (LESS THAN 100 MILES) BECAUSE YOU BECOME SLEEPY: NO
HAS YOUR RELATIONSHIP WITH FAMILY, FRIENDS OR WORK COLLEAGUES BEEN AFFECTED BECAUSE YOU ARE SLEEPY OR TIRED: NO

## 2024-06-26 NOTE — PROGRESS NOTES
[address]   [place] [address]   Tel.  [phone]   Fax. [phone]  [address]   [place] [address]   Tel.  [phone]   Fax. [phone] [address] [place] [address]   Tel.  [phone]   Fax. [phone]     Adrienne Yeung (: 1960) is a 63 y.o. female, established patient, seen for positive airway pressure follow-up, she was last seen by Dr. Workman on 2024, prior notes reviewed in detail. Initial evaluation 2000 at [facility]. Study demonstrated an overall AHI of 9.6/h. More prominent in REM sleep. Minimal SpO2 73%. CPAP was initiated. She had stopped using PAP and was seen in our office for re-evaluation s/p weight loss with gastric bypass.  In lab split sleep test 2024 showed AHI of 66.4/hr with a lowest SpO2 of 81%, events responding to BiLevel therapy after CPAP failure. She is seen today for follow up.     ASSESSMENT/PLAN:   Diagnosis Orders   1. NICHOLE (obstructive sleep apnea)  DME Order for (Specify) as OP      2. Primary hypertension        3. BMI 22.0-22.9, adult          AHI = 66.4 ().  On Bi - Level, Resmed :  Max IPAP 24 cmH2O; Min EPAP 12 cmH2O; PS 4 cmH2O. Set up 2024.    She is adherent with PAP therapy and PAP continues to benefit patient and remains necessary for control of her sleep apnea.     No follow-up provider specified.    Sleep Apnea -  She has lost about 100 lbs in the last year and a half (after gastric bypass).  She had repeated a sleep study () showing continued need for PAP, now Bilevel pressure. She admits to having a hard time acclimating to the higher pressure (she had previously been using CPAP). She notes last night was the first night she slept over 8 hours on the device without waking up. We will continue to monitor her progress on PAP.     Orders Placed This Encounter   Procedures    DME Order for (Specify) as OP     Diagnosis: (G47.33) NICHOLE (obstructive sleep

## 2024-08-11 DIAGNOSIS — I10 ESSENTIAL HYPERTENSION: ICD-10-CM

## 2024-08-12 RX ORDER — LOSARTAN POTASSIUM 100 MG/1
100 TABLET ORAL DAILY
Qty: 90 TABLET | Refills: 1 | Status: SHIPPED | OUTPATIENT
Start: 2024-08-12

## 2024-08-28 ENCOUNTER — OFFICE VISIT (OUTPATIENT)
Age: 64
End: 2024-08-28
Payer: MEDICARE

## 2024-08-28 VITALS
SYSTOLIC BLOOD PRESSURE: 138 MMHG | HEART RATE: 71 BPM | HEIGHT: 61 IN | TEMPERATURE: 97.8 F | DIASTOLIC BLOOD PRESSURE: 77 MMHG | OXYGEN SATURATION: 100 % | BODY MASS INDEX: 22.43 KG/M2 | WEIGHT: 118.8 LBS | RESPIRATION RATE: 16 BRPM

## 2024-08-28 DIAGNOSIS — M81.0 AGE-RELATED OSTEOPOROSIS WITHOUT CURRENT PATHOLOGICAL FRACTURE: ICD-10-CM

## 2024-08-28 DIAGNOSIS — Z98.84 BARIATRIC SURGERY STATUS: ICD-10-CM

## 2024-08-28 DIAGNOSIS — I10 ESSENTIAL HYPERTENSION: ICD-10-CM

## 2024-08-28 DIAGNOSIS — R80.9 TYPE 2 DIABETES MELLITUS WITH MICROALBUMINURIA, WITH LONG-TERM CURRENT USE OF INSULIN (HCC): Primary | ICD-10-CM

## 2024-08-28 DIAGNOSIS — Z98.84 STATUS POST BARIATRIC SURGERY: ICD-10-CM

## 2024-08-28 DIAGNOSIS — G47.33 OBSTRUCTIVE SLEEP APNEA SYNDROME: ICD-10-CM

## 2024-08-28 DIAGNOSIS — M25.552 LEFT HIP PAIN: ICD-10-CM

## 2024-08-28 DIAGNOSIS — F32.5 MAJOR DEPRESSIVE DISORDER, SINGLE EPISODE, IN FULL REMISSION (HCC): ICD-10-CM

## 2024-08-28 DIAGNOSIS — E78.5 HYPERLIPIDEMIA LDL GOAL <100: ICD-10-CM

## 2024-08-28 DIAGNOSIS — Z79.4 TYPE 2 DIABETES MELLITUS WITH MICROALBUMINURIA, WITH LONG-TERM CURRENT USE OF INSULIN (HCC): Primary | ICD-10-CM

## 2024-08-28 DIAGNOSIS — E11.29 TYPE 2 DIABETES MELLITUS WITH MICROALBUMINURIA, WITH LONG-TERM CURRENT USE OF INSULIN (HCC): Primary | ICD-10-CM

## 2024-08-28 PROBLEM — S22.080A CLOSED WEDGE COMPRESSION FRACTURE OF T12 VERTEBRA (HCC): Status: RESOLVED | Noted: 2023-08-17 | Resolved: 2024-08-28

## 2024-08-28 PROCEDURE — 99214 OFFICE O/P EST MOD 30 MIN: CPT | Performed by: INTERNAL MEDICINE

## 2024-08-28 SDOH — ECONOMIC STABILITY: INCOME INSECURITY: HOW HARD IS IT FOR YOU TO PAY FOR THE VERY BASICS LIKE FOOD, HOUSING, MEDICAL CARE, AND HEATING?: NOT HARD AT ALL

## 2024-08-28 SDOH — ECONOMIC STABILITY: FOOD INSECURITY: WITHIN THE PAST 12 MONTHS, THE FOOD YOU BOUGHT JUST DIDN'T LAST AND YOU DIDN'T HAVE MONEY TO GET MORE.: NEVER TRUE

## 2024-08-28 SDOH — ECONOMIC STABILITY: FOOD INSECURITY: WITHIN THE PAST 12 MONTHS, YOU WORRIED THAT YOUR FOOD WOULD RUN OUT BEFORE YOU GOT MONEY TO BUY MORE.: NEVER TRUE

## 2024-08-28 ASSESSMENT — PATIENT HEALTH QUESTIONNAIRE - PHQ9
SUM OF ALL RESPONSES TO PHQ QUESTIONS 1-9: 0
SUM OF ALL RESPONSES TO PHQ9 QUESTIONS 1 & 2: 0
1. LITTLE INTEREST OR PLEASURE IN DOING THINGS: NOT AT ALL
2. FEELING DOWN, DEPRESSED OR HOPELESS: NOT AT ALL

## 2024-08-28 ASSESSMENT — ENCOUNTER SYMPTOMS
ABDOMINAL PAIN: 0
SHORTNESS OF BREATH: 0

## 2024-08-28 NOTE — ASSESSMENT & PLAN NOTE
Borderline controlled, continue current medications and I have asked her to check blood pressures at home and send for review in a couple of weeks.  Continue healthy diet and avoidance of salt.

## 2024-08-28 NOTE — ASSESSMENT & PLAN NOTE
Unclear control, continue with lifestyle modifications.  She is no longer on rosuvastatin since significant weight loss after bariatric surgery.

## 2024-08-28 NOTE — PROGRESS NOTES
Adrienne Yeung is a 63 y.o. female who was seen in clinic today (8/28/2024).      Assessment & Plan:   Below is the assessment and plan developed based on review of pertinent history, physical exam, labs, studies, and medications.  1. Type 2 diabetes mellitus with microalbuminuria, with long-term current use of insulin (HCC)  Assessment & Plan:   Check lab work to assess for goal.  Continue with current medication and diabetic diet.  Orders:  -     Comprehensive Metabolic Panel; Future  -     Hemoglobin A1C; Future  -     Microalbumin / Creatinine Urine Ratio; Future  2. Essential hypertension  Assessment & Plan:   Borderline controlled, continue current medications and I have asked her to check blood pressures at home and send for review in a couple of weeks.  Continue healthy diet and avoidance of salt.  Orders:  -     Comprehensive Metabolic Panel; Future  3. Hyperlipidemia LDL goal <100  Assessment & Plan:   Unclear control, continue with lifestyle modifications.  She is no longer on rosuvastatin since significant weight loss after bariatric surgery.  Orders:  -     Lipid Panel; Future  -     Comprehensive Metabolic Panel; Future  -     CBC with Auto Differential; Future  4. Major depressive disorder, single episode, in full remission ([facility])  Assessment & Plan:   Well-controlled, continue current medications  5. Obstructive sleep apnea syndrome  Assessment & Plan:   Monitored by specialist- no acute findings meriting change in the plan. Now on BIPAP. Feeling rested in am when wears.   6. Status post bariatric surgery  7. Bariatric surgery status  -     Vitamin B12; Future  -     Vitamin D 25 Hydroxy; Future  8. Left hip pain  Comments:  Recommend evaluation by hip orthopedist.  Orders:  -     [facility] - Qasim Guzmán MD, Orthopedic Surgery (hip), [place]  9. Age-related osteoporosis without current pathological fracture  Assessment & Plan:   Monitored by endocrine specialist Dr. Gambino- no acute findings meriting  (ZADITOR) 0.025 % ophthalmic solution Apply 1 drop to eye as needed      aspirin 81 MG EC tablet 1 tab(s) orally once a day for 30 day(s)      triamcinolone (ARISTOCORT) 0.5 % cream Apply topically 2 times daily       No current facility-administered medications for this visit.      Review of Systems   Respiratory:  Negative for shortness of breath.    Cardiovascular:  Negative for chest pain and leg swelling.   Gastrointestinal:  Negative for abdominal pain.          Physical Exam  Vitals and nursing note reviewed.   Constitutional:       General: She is not in acute distress.  Cardiovascular:      Rate and Rhythm: Normal rate and regular rhythm.   Pulmonary:      Effort: Pulmonary effort is normal.      Breath sounds: Normal breath sounds.   Abdominal:      General: Bowel sounds are normal.      Palpations: Abdomen is soft.      Tenderness: There is no abdominal tenderness.   Musculoskeletal:      Left hip: Tenderness present. Decreased range of motion.      Right lower leg: No edema.      Left lower leg: No edema.   Neurological:      Mental Status: She is alert.   Psychiatric:         Mood and Affect: Mood normal.       Vitals:    08/28/24 1019 08/28/24 1156   BP: (!) 152/75 138/77   Pulse: 71    Resp: 16    Temp: 97.8 °F (36.6 °C)    TempSrc: Temporal    SpO2: 100%    Weight: 53.9 kg (118 lb 12.8 oz)    Height: 1.549 m (5' 1\")         I have discussed the diagnosis with the patient and the intended plan as seen in the above orders.  The patient has received an after-visit summary and questions were answered concerning future plans.  I have discussed medication side effects and warnings with the patient as well.  She has expressed understanding of the diagnosis and plan      Aspects of this note may have been generated using voice recognition software. Despite editing, there may be some syntax errors     Isabel Tolentino MD

## 2024-08-28 NOTE — ASSESSMENT & PLAN NOTE
Monitored by specialist- no acute findings meriting change in the plan. Now on BIPAP. Feeling rested in am when wears.

## 2024-08-29 DIAGNOSIS — E11.29 TYPE 2 DIABETES MELLITUS WITH MICROALBUMINURIA, WITH LONG-TERM CURRENT USE OF INSULIN (HCC): ICD-10-CM

## 2024-08-29 DIAGNOSIS — R80.9 TYPE 2 DIABETES MELLITUS WITH MICROALBUMINURIA, WITH LONG-TERM CURRENT USE OF INSULIN (HCC): ICD-10-CM

## 2024-08-29 DIAGNOSIS — Z79.4 TYPE 2 DIABETES MELLITUS WITH MICROALBUMINURIA, WITH LONG-TERM CURRENT USE OF INSULIN (HCC): ICD-10-CM

## 2024-08-29 DIAGNOSIS — E78.5 HYPERLIPIDEMIA LDL GOAL <100: ICD-10-CM

## 2024-08-29 DIAGNOSIS — I10 ESSENTIAL HYPERTENSION: ICD-10-CM

## 2024-08-29 DIAGNOSIS — Z98.84 BARIATRIC SURGERY STATUS: ICD-10-CM

## 2024-08-29 LAB
25(OH)D3 SERPL-MCNC: 73.9 NG/ML (ref 30–100)
ALBUMIN SERPL-MCNC: 4 G/DL (ref 3.5–5)
ALBUMIN/GLOB SERPL: 1.6 (ref 1.1–2.2)
ALP SERPL-CCNC: 88 U/L (ref 45–117)
ALT SERPL-CCNC: 42 U/L (ref 12–78)
ANION GAP SERPL CALC-SCNC: 0 MMOL/L (ref 5–15)
AST SERPL-CCNC: 33 U/L (ref 15–37)
BASOPHILS # BLD: 0.1 K/UL (ref 0–0.1)
BASOPHILS NFR BLD: 1 % (ref 0–1)
BILIRUB SERPL-MCNC: 0.3 MG/DL (ref 0.2–1)
BUN SERPL-MCNC: 11 MG/DL (ref 6–20)
BUN/CREAT SERPL: 21 (ref 12–20)
CALCIUM SERPL-MCNC: 9.2 MG/DL (ref 8.5–10.1)
CHLORIDE SERPL-SCNC: 104 MMOL/L (ref 97–108)
CHOLEST SERPL-MCNC: 132 MG/DL
CO2 SERPL-SCNC: 33 MMOL/L (ref 21–32)
CREAT SERPL-MCNC: 0.52 MG/DL (ref 0.55–1.02)
CREAT UR-MCNC: 18.1 MG/DL
DIFFERENTIAL METHOD BLD: ABNORMAL
EOSINOPHIL # BLD: 0.1 K/UL (ref 0–0.4)
EOSINOPHIL NFR BLD: 2 % (ref 0–7)
ERYTHROCYTE [DISTWIDTH] IN BLOOD BY AUTOMATED COUNT: 13.3 % (ref 11.5–14.5)
EST. AVERAGE GLUCOSE BLD GHB EST-MCNC: 103 MG/DL
GLOBULIN SER CALC-MCNC: 2.5 G/DL (ref 2–4)
GLUCOSE SERPL-MCNC: 90 MG/DL (ref 65–100)
HBA1C MFR BLD: 5.2 % (ref 4–5.6)
HCT VFR BLD AUTO: 37.6 % (ref 35–47)
HDLC SERPL-MCNC: 61 MG/DL
HDLC SERPL: 2.2 (ref 0–5)
HGB BLD-MCNC: 12 G/DL (ref 11.5–16)
IMM GRANULOCYTES # BLD AUTO: 0 K/UL (ref 0–0.04)
IMM GRANULOCYTES NFR BLD AUTO: 0 % (ref 0–0.5)
LDLC SERPL CALC-MCNC: 55 MG/DL (ref 0–100)
LYMPHOCYTES # BLD: 1.9 K/UL (ref 0.8–3.5)
LYMPHOCYTES NFR BLD: 50 % (ref 12–49)
MCH RBC QN AUTO: 29.6 PG (ref 26–34)
MCHC RBC AUTO-ENTMCNC: 31.9 G/DL (ref 30–36.5)
MCV RBC AUTO: 92.8 FL (ref 80–99)
MICROALBUMIN UR-MCNC: 0.79 MG/DL
MICROALBUMIN/CREAT UR-RTO: 44 MG/G (ref 0–30)
MONOCYTES # BLD: 0.4 K/UL (ref 0–1)
MONOCYTES NFR BLD: 9 % (ref 5–13)
NEUTS SEG # BLD: 1.5 K/UL (ref 1.8–8)
NEUTS SEG NFR BLD: 38 % (ref 32–75)
NRBC # BLD: 0 K/UL (ref 0–0.01)
NRBC BLD-RTO: 0 PER 100 WBC
PLATELET # BLD AUTO: 299 K/UL (ref 150–400)
PMV BLD AUTO: 10.1 FL (ref 8.9–12.9)
POTASSIUM SERPL-SCNC: 4.3 MMOL/L (ref 3.5–5.1)
PROT SERPL-MCNC: 6.5 G/DL (ref 6.4–8.2)
RBC # BLD AUTO: 4.05 M/UL (ref 3.8–5.2)
SODIUM SERPL-SCNC: 137 MMOL/L (ref 136–145)
SPECIMEN HOLD: NORMAL
TRIGL SERPL-MCNC: 80 MG/DL
VIT B12 SERPL-MCNC: 894 PG/ML (ref 193–986)
VLDLC SERPL CALC-MCNC: 16 MG/DL
WBC # BLD AUTO: 3.9 K/UL (ref 3.6–11)

## 2024-08-29 RX ORDER — METFORMIN HCL 500 MG
500 TABLET, EXTENDED RELEASE 24 HR ORAL 2 TIMES DAILY
Qty: 180 TABLET | Refills: 1 | Status: SHIPPED | OUTPATIENT
Start: 2024-08-29

## 2024-09-15 ENCOUNTER — PATIENT MESSAGE (OUTPATIENT)
Age: 64
End: 2024-09-15

## 2024-09-19 RX ORDER — AMLODIPINE BESYLATE 5 MG/1
5 TABLET ORAL DAILY
Qty: 90 TABLET | Refills: 0 | Status: SHIPPED | OUTPATIENT
Start: 2024-09-19

## 2024-09-30 RX ORDER — ROSUVASTATIN CALCIUM 5 MG/1
TABLET, COATED ORAL
Qty: 90 TABLET | Refills: 0 | OUTPATIENT
Start: 2024-09-30

## 2024-10-05 ENCOUNTER — PATIENT MESSAGE (OUTPATIENT)
Age: 64
End: 2024-10-05

## 2024-10-17 DIAGNOSIS — F32.5 MAJOR DEPRESSIVE DISORDER, SINGLE EPISODE, IN FULL REMISSION (HCC): ICD-10-CM

## 2024-10-30 ENCOUNTER — PATIENT MESSAGE (OUTPATIENT)
Age: 64
End: 2024-10-30

## 2024-11-07 RX ORDER — AMLODIPINE BESYLATE 5 MG/1
10 TABLET ORAL DAILY
Qty: 90 TABLET | Refills: 0
Start: 2024-11-07

## 2024-12-04 ENCOUNTER — TELEPHONE (OUTPATIENT)
Age: 64
End: 2024-12-04

## 2024-12-04 NOTE — TELEPHONE ENCOUNTER
LM for pt to call and schedule annual bariatric exam.  [employer] message also sent. Letter sent. [facility]

## 2024-12-06 ENCOUNTER — PATIENT MESSAGE (OUTPATIENT)
Age: 64
End: 2024-12-06

## 2024-12-09 RX ORDER — AMLODIPINE BESYLATE 5 MG/1
10 TABLET ORAL DAILY
Qty: 180 TABLET | Refills: 0 | Status: SHIPPED | OUTPATIENT
Start: 2024-12-09

## 2024-12-10 ENCOUNTER — TELEPHONE (OUTPATIENT)
Age: 64
End: 2024-12-10

## 2024-12-10 ENCOUNTER — TELEMEDICINE (OUTPATIENT)
Age: 64
End: 2024-12-10
Payer: MEDICARE

## 2024-12-10 VITALS — WEIGHT: 119 LBS | HEIGHT: 61 IN | BODY MASS INDEX: 22.47 KG/M2

## 2024-12-10 DIAGNOSIS — Z98.84 S/P GASTRIC BYPASS: ICD-10-CM

## 2024-12-10 DIAGNOSIS — E66.01 MORBID OBESITY: Primary | ICD-10-CM

## 2024-12-10 PROCEDURE — 1036F TOBACCO NON-USER: CPT | Performed by: NURSE PRACTITIONER

## 2024-12-10 PROCEDURE — 1090F PRES/ABSN URINE INCON ASSESS: CPT | Performed by: NURSE PRACTITIONER

## 2024-12-10 PROCEDURE — G8427 DOCREV CUR MEDS BY ELIG CLIN: HCPCS | Performed by: NURSE PRACTITIONER

## 2024-12-10 PROCEDURE — G8399 PT W/DXA RESULTS DOCUMENT: HCPCS | Performed by: NURSE PRACTITIONER

## 2024-12-10 PROCEDURE — G8484 FLU IMMUNIZE NO ADMIN: HCPCS | Performed by: NURSE PRACTITIONER

## 2024-12-10 PROCEDURE — 99212 OFFICE O/P EST SF 10 MIN: CPT | Performed by: NURSE PRACTITIONER

## 2024-12-10 PROCEDURE — 1159F MED LIST DOCD IN RCRD: CPT | Performed by: NURSE PRACTITIONER

## 2024-12-10 PROCEDURE — G8420 CALC BMI NORM PARAMETERS: HCPCS | Performed by: NURSE PRACTITIONER

## 2024-12-10 PROCEDURE — 1123F ACP DISCUSS/DSCN MKR DOCD: CPT | Performed by: NURSE PRACTITIONER

## 2024-12-10 PROCEDURE — 3017F COLORECTAL CA SCREEN DOC REV: CPT | Performed by: NURSE PRACTITIONER

## 2024-12-10 ASSESSMENT — ENCOUNTER SYMPTOMS
VOMITING: 0
CHEST TIGHTNESS: 0
NAUSEA: 0
ABDOMINAL PAIN: 0
SHORTNESS OF BREATH: 0

## 2024-12-10 NOTE — TELEPHONE ENCOUNTER
Patient Medicare Supp with [employer] has been active since 1/1/24 with no scheduled term date. Verified on [employer] website.

## 2024-12-10 NOTE — PROGRESS NOTES
Adrienne Yeung (:  1960) is a 64 y.o. female,Established patient, here for evaluation of the following chief complaint(s):  Follow-up (1 YEAR 11 MONTHS S/P ROBOTIC GASTRIC BYPASS, LYSIS OF ADHESIONS, EGD (E R A S))        SUBJECTIVE/OBJECTIVE:    HPI:  Adrienne Yeung is a 64 y.o. female with previous Malabsorpative Gastric bypass surgery on 1 year and 11 months ago. . . She has lost a total of 96 pounds since surgery. She  has lost 23 lbs since the last ov.  Body mass index is 22.48 kg/m².. no nausea and no vomiting . Denies Acid reflux/heartburn.. Drinking  64 ounces of water daily. 60  protein intake daily. + BM's. Pt is working out with a  for exercise.   Dietary recall -    Breakfast- scrambled eggs, wheat toast  Lunch- tuna  Dinner- sometimes she skips dinner, sauteed mushroom, meat    She is not snacking between meals; .      Vitamins:  MVI : yes  Calcium : yes  B-Vit 12: yes  Vit D: Yes          Ms. Yeung has a reminder for a \"due or due soon\" health maintenance. I have asked that she contact her primary care provider for follow-up on this health maintenance.        COMORBIDITY     SLEEP APNEA                 YES  - bipap      GERD  (req.meds)            NO  HYPERLIPIDEMIA            NO  HYPERTENSION              YES         DIABETES                         yes         Current Outpatient Medications:     Ferrous Fumarate 150 MG TABS, 1 tablet Orally twice daily with food for 30 days, Disp: , Rfl:     amLODIPine (NORVASC) 5 MG tablet, Take 2 tablets by mouth daily, Disp: 180 tablet, Rfl: 0    FLUoxetine (PROZAC) 20 MG capsule, TAKE 3 CAPSULES EVERY DAY, Disp: 270 capsule, Rfl: 1    metFORMIN (GLUCOPHAGE-XR) 500 MG extended release tablet, Take 1 tablet by mouth 2 times daily, Disp: 180 tablet, Rfl: 1    losartan (COZAAR) 100 MG tablet, TAKE 1 TABLET EVERY DAY, Disp: 90 tablet, Rfl: 1    Cetirizine HCl (ZYRTEC ALLERGY) 10 MG CAPS, 1 tab(s) chewed once a day, Disp: , Rfl:     Multiple

## 2024-12-10 NOTE — PROGRESS NOTES
Identified patient with two patient identifiers (name and ). Reviewed chart in preparation for visit and have obtained necessary documentation.    Adrienne Yeung is a 64 y.o. female  Chief Complaint   Patient presents with    Follow-up     1 YEAR 11 MONTHS S/P ROBOTIC GASTRIC BYPASS, LYSIS OF ADHESIONS, EGD (E R A S)     Ht 1.549 m (5' 1\")   Wt 54 kg (119 lb)   BMI 22.48 kg/m²     1. Have you been to the ER, urgent care clinic since your last visit?  Hospitalized since your last visit?no    2. Have you seen or consulted any other health care providers outside of the [facility] since your last visit?  Include any pap smears or colon screening. no

## 2025-01-05 DIAGNOSIS — I10 ESSENTIAL HYPERTENSION: ICD-10-CM

## 2025-01-07 RX ORDER — LOSARTAN POTASSIUM 100 MG/1
100 TABLET ORAL DAILY
Qty: 90 TABLET | Refills: 0 | Status: SHIPPED | OUTPATIENT
Start: 2025-01-07

## 2025-02-21 RX ORDER — AMLODIPINE BESYLATE 5 MG/1
10 TABLET ORAL DAILY
Qty: 180 TABLET | Refills: 0 | Status: SHIPPED | OUTPATIENT
Start: 2025-02-21

## 2025-03-13 DIAGNOSIS — F32.5 MAJOR DEPRESSIVE DISORDER, SINGLE EPISODE, IN FULL REMISSION: ICD-10-CM

## 2025-03-21 DIAGNOSIS — I10 ESSENTIAL HYPERTENSION: ICD-10-CM

## 2025-03-21 RX ORDER — LOSARTAN POTASSIUM 100 MG/1
100 TABLET ORAL DAILY
Qty: 90 TABLET | Refills: 3 | OUTPATIENT
Start: 2025-03-21

## 2025-03-24 SDOH — HEALTH STABILITY: PHYSICAL HEALTH: ON AVERAGE, HOW MANY DAYS PER WEEK DO YOU ENGAGE IN MODERATE TO STRENUOUS EXERCISE (LIKE A BRISK WALK)?: 2 DAYS

## 2025-03-24 SDOH — ECONOMIC STABILITY: FOOD INSECURITY: WITHIN THE PAST 12 MONTHS, YOU WORRIED THAT YOUR FOOD WOULD RUN OUT BEFORE YOU GOT MONEY TO BUY MORE.: NEVER TRUE

## 2025-03-24 SDOH — ECONOMIC STABILITY: FOOD INSECURITY: WITHIN THE PAST 12 MONTHS, THE FOOD YOU BOUGHT JUST DIDN'T LAST AND YOU DIDN'T HAVE MONEY TO GET MORE.: NEVER TRUE

## 2025-03-24 SDOH — HEALTH STABILITY: PHYSICAL HEALTH: ON AVERAGE, HOW MANY MINUTES DO YOU ENGAGE IN EXERCISE AT THIS LEVEL?: 50 MIN

## 2025-03-24 SDOH — ECONOMIC STABILITY: INCOME INSECURITY: IN THE LAST 12 MONTHS, WAS THERE A TIME WHEN YOU WERE NOT ABLE TO PAY THE MORTGAGE OR RENT ON TIME?: NO

## 2025-03-24 SDOH — ECONOMIC STABILITY: TRANSPORTATION INSECURITY
IN THE PAST 12 MONTHS, HAS LACK OF TRANSPORTATION KEPT YOU FROM MEETINGS, WORK, OR FROM GETTING THINGS NEEDED FOR DAILY LIVING?: NO

## 2025-03-24 SDOH — ECONOMIC STABILITY: TRANSPORTATION INSECURITY
IN THE PAST 12 MONTHS, HAS THE LACK OF TRANSPORTATION KEPT YOU FROM MEDICAL APPOINTMENTS OR FROM GETTING MEDICATIONS?: NO

## 2025-03-24 ASSESSMENT — PATIENT HEALTH QUESTIONNAIRE - PHQ9
SUM OF ALL RESPONSES TO PHQ QUESTIONS 1-9: 0
2. FEELING DOWN, DEPRESSED OR HOPELESS: NOT AT ALL
1. LITTLE INTEREST OR PLEASURE IN DOING THINGS: NOT AT ALL

## 2025-03-24 ASSESSMENT — LIFESTYLE VARIABLES
HOW OFTEN DO YOU HAVE A DRINK CONTAINING ALCOHOL: 3
HOW OFTEN DO YOU HAVE SIX OR MORE DRINKS ON ONE OCCASION: 1
HOW OFTEN DO YOU HAVE A DRINK CONTAINING ALCOHOL: 2-4 TIMES A MONTH
HOW MANY STANDARD DRINKS CONTAINING ALCOHOL DO YOU HAVE ON A TYPICAL DAY: 98
HOW MANY STANDARD DRINKS CONTAINING ALCOHOL DO YOU HAVE ON A TYPICAL DAY: PATIENT DECLINED

## 2025-03-26 ENCOUNTER — OFFICE VISIT (OUTPATIENT)
Age: 65
End: 2025-03-26
Payer: MEDICARE

## 2025-03-26 VITALS
SYSTOLIC BLOOD PRESSURE: 118 MMHG | HEART RATE: 92 BPM | OXYGEN SATURATION: 99 % | HEIGHT: 61 IN | BODY MASS INDEX: 24.51 KG/M2 | TEMPERATURE: 98.7 F | RESPIRATION RATE: 16 BRPM | WEIGHT: 129.8 LBS | DIASTOLIC BLOOD PRESSURE: 64 MMHG

## 2025-03-26 DIAGNOSIS — Z79.4 TYPE 2 DIABETES MELLITUS WITH MICROALBUMINURIA, WITH LONG-TERM CURRENT USE OF INSULIN (HCC): ICD-10-CM

## 2025-03-26 DIAGNOSIS — R80.9 TYPE 2 DIABETES MELLITUS WITH MICROALBUMINURIA, WITH LONG-TERM CURRENT USE OF INSULIN (HCC): ICD-10-CM

## 2025-03-26 DIAGNOSIS — Z12.11 SCREEN FOR COLON CANCER: ICD-10-CM

## 2025-03-26 DIAGNOSIS — Z00.00 INITIAL MEDICARE ANNUAL WELLNESS VISIT: Primary | ICD-10-CM

## 2025-03-26 DIAGNOSIS — E11.29 TYPE 2 DIABETES MELLITUS WITH MICROALBUMINURIA, WITH LONG-TERM CURRENT USE OF INSULIN (HCC): ICD-10-CM

## 2025-03-26 DIAGNOSIS — E78.5 HYPERLIPIDEMIA LDL GOAL <100: ICD-10-CM

## 2025-03-26 DIAGNOSIS — F32.5 MAJOR DEPRESSIVE DISORDER, SINGLE EPISODE, IN FULL REMISSION: ICD-10-CM

## 2025-03-26 DIAGNOSIS — M81.0 AGE-RELATED OSTEOPOROSIS WITHOUT CURRENT PATHOLOGICAL FRACTURE: ICD-10-CM

## 2025-03-26 DIAGNOSIS — I10 ESSENTIAL HYPERTENSION: ICD-10-CM

## 2025-03-26 PROCEDURE — 99214 OFFICE O/P EST MOD 30 MIN: CPT | Performed by: INTERNAL MEDICINE

## 2025-03-26 RX ORDER — ROSUVASTATIN CALCIUM 20 MG/1
TABLET, COATED ORAL
COMMUNITY

## 2025-03-26 RX ORDER — AMLODIPINE BESYLATE 10 MG/1
10 TABLET ORAL DAILY
Qty: 90 TABLET | Refills: 1 | Status: SHIPPED | OUTPATIENT
Start: 2025-03-26

## 2025-03-26 RX ORDER — ZOLEDRONIC ACID 0.05 MG/ML
5 INJECTION, SOLUTION INTRAVENOUS ONCE
COMMUNITY

## 2025-03-26 ASSESSMENT — ENCOUNTER SYMPTOMS
ABDOMINAL PAIN: 0
SORE THROAT: 0
DIARRHEA: 0
CONSTIPATION: 0
SHORTNESS OF BREATH: 0
BLOOD IN STOOL: 0
COUGH: 0
BACK PAIN: 0
NAUSEA: 0

## 2025-03-26 ASSESSMENT — PATIENT HEALTH QUESTIONNAIRE - PHQ9
SUM OF ALL RESPONSES TO PHQ QUESTIONS 1-9: 0
2. FEELING DOWN, DEPRESSED OR HOPELESS: NOT AT ALL
1. LITTLE INTEREST OR PLEASURE IN DOING THINGS: NOT AT ALL
SUM OF ALL RESPONSES TO PHQ QUESTIONS 1-9: 0

## 2025-03-26 NOTE — PROGRESS NOTES
Medicare Annual Wellness Visit    Adrienne Yeung is here for Medicare AW    Assessment & Plan   Initial Medicare annual wellness visit  Health maintenance reviewed and updated with patient today at visit.    Type 2 diabetes mellitus with microalbuminuria, with long-term current use of insulin ([facility])  Assessment & Plan:   Check lab work to assess for goal.  Continue with current medication and diabetic diet.  Orders:  -     Hemoglobin A1C; Future  Essential hypertension  Assessment & Plan:   At goal continue amlodipine and losartan.  She will send me the exact amount of losartan that she is taking for clarification.  Screen for colon cancer  -     Cologuard (Fecal DNA Colorectal Cancer Screening)  Counseled regarding my recommendations for colonoscopy as a better screening and preventative test.  She prefers Cologuard.  Age-related osteoporosis without current pathological fracture  Assessment & Plan:   Monitored by endocrine specialist - no acute findings meriting change in the plan  Hyperlipidemia LDL goal <100  Assessment & Plan:   Unclear control, continue with lifestyle modifications.  Continue current medication pending lab work..  Major depressive disorder, single episode, in full remission  Assessment & Plan:   Well-controlled, continue current medications         Return in 6 months (on 9/26/2025).     Subjective   The following acute and/or chronic problems were also addressed today:  Patient Active Problem List   Diagnosis    Hyperlipidemia LDL goal <100    Essential hypertension    H/O bilateral mastectomy    History of carcinoma in situ of breast    Obstructive sleep apnea syndrome    Type 2 diabetes mellitus with microalbuminuria, with long-term current use of insulin (HCC)    Major depressive disorder, single episode, in full remission    Status post bariatric surgery    Age-related osteoporosis without current pathological fracture    Taking medication with no side effects.   Exercise:

## 2025-03-26 NOTE — PROGRESS NOTES
Pt last OV note and lab report requested from [facility] per MD request. Fax # [phone]. Confirmation received.     Pt last Diabetic eye exam report requested from [facility] per MD request. Fax # [phone]. Confirmation received.

## 2025-03-26 NOTE — PATIENT INSTRUCTIONS
Learning About Being Active as an Older Adult  Why is being active important as you get older?     Being active is one of the best things you can do for your health. And it's never too late to start. Being active--or getting active, if you aren't already--has definite benefits. It can:  Give you more energy,  Keep your mind sharp.  Improve balance to reduce your risk of falls.  Help you manage chronic illness with fewer medicines.  No matter how old you are, how fit you are, or what health problems you have, there is a form of activity that will work for you. And the more physical activity you can do, the better your overall health will be.  What kinds of activity can help you stay healthy?  Being more active will make your daily activities easier. Physical activity includes planned exercise and things you do in daily life. There are four types of activity:  Aerobic.  Doing aerobic activity makes your heart and lungs strong.  Includes walking, dancing, and gardening.  Aim for at least 2½ hours spread throughout the week.  It improves your energy and can help you sleep better.  Muscle-strengthening.  This type of activity can help maintain muscle and strengthen bones.  Includes climbing stairs, using resistance bands, and lifting or carrying heavy loads.  Aim for at least twice a week.  It can help protect the knees and other joints.  Stretching.  Stretching gives you better range of motion in joints and muscles.  Includes upper arm stretches, calf stretches, and gentle yoga.  Aim for at least twice a week, preferably after your muscles are warmed up from other activities.  It can help you function better in daily life.  Balancing.  This helps you stay coordinated and have good posture.  Includes heel-to-toe walking, adria chi, and certain types of yoga.  Aim for at least 3 days a week.  It can reduce your risk of falling.  Even if you have a hard time meeting the recommendations, it's better to be more active

## 2025-03-27 LAB
EST. AVERAGE GLUCOSE BLD GHB EST-MCNC: 114 MG/DL
HBA1C MFR BLD: 5.6 % (ref 4–5.6)

## 2025-03-28 NOTE — ASSESSMENT & PLAN NOTE
At goal continue amlodipine and losartan.  She will send me the exact amount of losartan that she is taking for clarification.

## 2025-03-28 NOTE — ASSESSMENT & PLAN NOTE
Unclear control, continue with lifestyle modifications.  Continue current medication pending lab work..

## 2025-04-04 ENCOUNTER — RESULTS FOLLOW-UP (OUTPATIENT)
Age: 65
End: 2025-04-04

## 2025-04-04 DIAGNOSIS — I10 ESSENTIAL HYPERTENSION: ICD-10-CM

## 2025-04-04 DIAGNOSIS — F32.5 MAJOR DEPRESSIVE DISORDER, SINGLE EPISODE, IN FULL REMISSION: ICD-10-CM

## 2025-04-04 RX ORDER — METFORMIN HYDROCHLORIDE 500 MG/1
500 TABLET, EXTENDED RELEASE ORAL 2 TIMES DAILY
Qty: 180 TABLET | Refills: 1 | Status: SHIPPED | OUTPATIENT
Start: 2025-04-04

## 2025-04-04 RX ORDER — LOSARTAN POTASSIUM 100 MG/1
100 TABLET ORAL DAILY
Qty: 90 TABLET | Refills: 0 | Status: SHIPPED | OUTPATIENT
Start: 2025-04-04

## 2025-05-02 LAB — NONINV COLON CA DNA+OCC BLD SCRN STL QL: POSITIVE

## 2025-05-06 DIAGNOSIS — R19.5 POSITIVE COLORECTAL CANCER SCREENING USING COLOGUARD TEST: Primary | ICD-10-CM

## 2025-05-07 ENCOUNTER — TELEPHONE (OUTPATIENT)
Age: 65
End: 2025-05-07

## 2025-05-07 NOTE — TELEPHONE ENCOUNTER
Reason for call:  Spoke with pt. Pt returning [facility] phone call. Pt requested a call back.    Is this a new problem: Yes    Date of last appointment:  3/26/2025     Can we respond via [employer]: No    Best call back number: Adrienne Yeung \"Karsten\"   [phone]

## 2025-06-17 ENCOUNTER — TELEPHONE (OUTPATIENT)
Age: 65
End: 2025-06-17

## 2025-06-17 NOTE — TELEPHONE ENCOUNTER
Lucy Lugo NP has identified that she will be out of the office on 6/26/25 and this affects patients appointment. Left voicemail to reschedule as appointment has been cancelled at this time    Sent [employer] Message/mailed letter.

## 2025-07-21 DIAGNOSIS — F32.5 MAJOR DEPRESSIVE DISORDER, SINGLE EPISODE, IN FULL REMISSION: ICD-10-CM

## 2025-08-20 DIAGNOSIS — I10 ESSENTIAL HYPERTENSION: Primary | ICD-10-CM

## 2025-08-20 RX ORDER — AMLODIPINE BESYLATE 10 MG/1
10 TABLET ORAL DAILY
Qty: 90 TABLET | Refills: 0 | Status: SHIPPED | OUTPATIENT
Start: 2025-08-20

## (undated) DEVICE — SUTURE PDS + SZ 0 L27IN ABSRB VLT L36MM CT 1 1 2 CIR PDP340H

## (undated) DEVICE — SURGIFOAM SPNG SZ 100

## (undated) DEVICE — SUTURE DEV SZ 0 L6IN N ABSORBABLE

## (undated) DEVICE — STERILE POLYISOPRENE POWDER-FREE SURGICAL GLOVES WITH EMOLLIENT COATING: Brand: PROTEXIS

## (undated) DEVICE — ELECTRO LUBE IS A SINGLE PATIENT USE DEVICE THAT IS INTENDED TO BE USED ON ELECTROSURGICAL ELECTRODES TO REDUCE STICKING.: Brand: KEY SURGICAL ELECTRO LUBE

## (undated) DEVICE — BINDER ABD M/L H12IN FOR 46-62IN WHT 4 SLD PNL DSGN HOOP

## (undated) DEVICE — STAPLER SKIN LN REINF 60 MM ECHELON ENDOPATH

## (undated) DEVICE — GOWN,SIRUS,NONRNF,SETINSLV,2XL,18/CS: Brand: MEDLINE

## (undated) DEVICE — ENDO CARRY-ON PROCEDURE KIT INCLUDES ENZYMATIC SPONGE, GAUZE, BIOHAZARD LABEL, TRAY, LUBRICANT, DIRTY SCOPE LABEL, WATER LABEL, TRAY, DRAWSTRING PAD, AND DEFENDO 4-PIECE KIT.: Brand: ENDO CARRY-ON PROCEDURE KIT

## (undated) DEVICE — ELECTRODE ES 36CM LAP FLAT L HK COAT DISP CLEANCOAT

## (undated) DEVICE — ADHESIVE SKIN CLSR 0.7ML TOP DERMBND ADV

## (undated) DEVICE — SUTURE ABSORBABLE MONOFILAMENT 0 CTX 36 IN VIO PDS + PDP370T

## (undated) DEVICE — SUTURE DEV SZ 3-0 V-LOC 90 L12IN TO L18IN CV-23 VLT VLOCM0844

## (undated) DEVICE — SOLUTION IRRIG 1000ML 0.9% SOD CHL USP POUR PLAS BTL

## (undated) DEVICE — Device

## (undated) DEVICE — TROCAR ENDOSCP SHFT L150MM DIA12MM BLDELSS ENDOPATH XCEL

## (undated) DEVICE — SUTURE MCRYL + SZ 4-0 L27IN ABSRB UD L19MM PS-2 3/8 CIR MCP426H

## (undated) DEVICE — SEALER LAP L37CM MARYLAND JAW OPN NANO COAT MULTIFUNCTIONAL

## (undated) DEVICE — KENDALL SCD EXPRESS SLEEVES, KNEE LENGTH, MEDIUM: Brand: KENDALL SCD

## (undated) DEVICE — SEAL

## (undated) DEVICE — TEMPORARY FIXATION SCREW

## (undated) DEVICE — SUTURE MCRYL SZ 4-0 L27IN ABSRB UD L19MM PS-2 1/2 CIR PRIM Y426H

## (undated) DEVICE — PACK,BASIC,SIRUS,V: Brand: MEDLINE

## (undated) DEVICE — SUTURE NONABSORBABLE MONOFILAMENT 2-0 V-20 6 IN V-LOC PBT VLOCN0605

## (undated) DEVICE — GLOVE SURG SZ 7 L12IN FNGR THK79MIL GRN LTX FREE

## (undated) DEVICE — ELECTRODE PT RET AD L9FT HI MOIST COND ADH HYDRGEL CORDED

## (undated) DEVICE — SUTURE V-LOC 180 SZ 0 L18IN ABSRB GRN GS-21 L37MM 1/2 CIR VLOCL0326

## (undated) DEVICE — SUT ETHLN 2-0 18IN FS BLK --

## (undated) DEVICE — MESH GAUZE BANDAGE ROLL: Brand: CURITY

## (undated) DEVICE — PREP SKN PREVAIL 40ML APPL --

## (undated) DEVICE — GARMENT,MEDLINE,DVT,INT,CALF,MED, GEN2: Brand: MEDLINE

## (undated) DEVICE — STAPLER 60: Brand: SUREFORM

## (undated) DEVICE — NDL SPNE QNCKE 22GX3.5IN LF --

## (undated) DEVICE — REDUCER CANN ENDOWRIST 12-8MM -- DA VINCI XI - SNGL USE

## (undated) DEVICE — AIR SHEET,LAT,COMFORT GLIDE, BLEND 40X80: Brand: MEDLINE

## (undated) DEVICE — OBTRTR BLDELSS OPT 8MM DISP -- DA VINICI XI - SNGL USE

## (undated) DEVICE — TAPE UMB W1/8XL36IN WHT COT STRND NONRADIOPAQUE

## (undated) DEVICE — VESSEL SEALER XI EXTENDED DISP -- VESSEL

## (undated) DEVICE — SCREW EXT FIX L14MM FOR DISTRCTN

## (undated) DEVICE — SPONGE: SPECIALTY PEANUT XR 100/CS: Brand: MEDICAL ACTION INDUSTRIES

## (undated) DEVICE — SUTURE VCRL SZ 3-0 L27IN ABSRB UD L24MM PS-1 3/8 CIR PRIM J936H

## (undated) DEVICE — DRAIN SURG W7MMXL20CM SIL FULL PERF HUBLESS FLAT RADPQ STRP

## (undated) DEVICE — SUTURE PROL SZ 0 L30IN NONABSORBABLE BLU L26MM CT-2 1/2 CIR 8412H

## (undated) DEVICE — MARYLAND JAW LAPAROSCOPIC SEALER/DIVIDER COATED: Brand: LIGASURE

## (undated) DEVICE — TAPE UMBILICAL 316X18IN STERILE 2PK

## (undated) DEVICE — MASTISOL ADHESIVE LIQ 2/3ML

## (undated) DEVICE — DRILL BIT WITH STOP, 12MM

## (undated) DEVICE — VISIGI 3D®  CALIBRATION SYSTEM  SIZE 40FR STD W/ BULB: Brand: BOEHRINGER® VISIGI 3D™ SLEEVE GASTRECTOMY CALIBRATION SYSTEM, SIZE 40FR W/BULB

## (undated) DEVICE — SYSTEM EVAC SMOKE LAPARSCOPIC

## (undated) DEVICE — INSULATED BLADE ELECTRODE: Brand: EDGE

## (undated) DEVICE — TOOL 14MH30 LEGEND 14CM 3MM: Brand: MIDAS REX ™

## (undated) DEVICE — SUTURE VCRL 2-0 L27IN ABSRB UD CP-2 L26MM 1/2 CIR REV CUT J869H

## (undated) DEVICE — SUTURE VCRL SZ 2-0 L27IN ABSRB UD L26MM SH 1/2 CIR J417H

## (undated) DEVICE — STAPLER RELOAD SUREFORM 60 BLU -- DA VINCI XI

## (undated) DEVICE — APPLIER LIG CLP 115IN AUTO W SUP INTLOK 30 M TI CLP PREM

## (undated) DEVICE — SYR LR LCK 1ML GRAD NSAF 30ML --

## (undated) DEVICE — NDL SPNE QNCKE 18GX3.5IN LF --

## (undated) DEVICE — 3M™ DURAPORE™ SURGICAL TAPE 1538-3, 3 INCH X 10 YARD (7,5CM X 9,1M), 4 ROLLS/BOX: Brand: 3M™ DURAPORE™

## (undated) DEVICE — ARM DRAPE

## (undated) DEVICE — BONE WAX WHITE: Brand: BONE WAX WHITE

## (undated) DEVICE — STAPLER 60 RELOAD WHITE: Brand: SUREFORM

## (undated) DEVICE — COVER MPLR TIP CRV SCIS ACC DA VINCI

## (undated) DEVICE — DEVON™ KNEE AND BODY STRAP 60" X 3" (1.5 M X 7.6 CM): Brand: DEVON

## (undated) DEVICE — SUTURE V-LOC 180 SZ 0 L12IN ABSRB GRN L37MM GS-21 1/2 CIR VLOCL0316

## (undated) DEVICE — DRAPE,LAPAROTOMY,T,PEDI,STERILE: Brand: MEDLINE

## (undated) DEVICE — DRAIN SURG 19FR 100% SIL RADPQ RND CHN FULL FLUT

## (undated) DEVICE — SOLUTION IV 1000ML 0.9% SOD CHL

## (undated) DEVICE — SUTURE V-LOC 90 SZ 3-0 L6IN ABSRB VLT V-20 L26MM 1/2 CIR VLOCM0604

## (undated) DEVICE — SOLUTION IV 250ML 0.9% SOD CHL CLR INJ FLX BG CONT PRT CLSR

## (undated) DEVICE — SOLUTION ANTIFOG VIS SYS CLEARIFY LAPSCP

## (undated) DEVICE — CATH IV AUTOGRD BC GRN 18GA 30 -- INSYTE

## (undated) DEVICE — VISUALIZATION SYSTEM: Brand: CLEARIFY

## (undated) DEVICE — KIT DISPOSABLE ACC 4ARM ENDOWRIST DAVINCI

## (undated) DEVICE — BIPOLAR IRRIGATOR INTEGRATED TUBING AND BIPOLAR CORD SET, DISPOSABLE

## (undated) DEVICE — TUBING, SUCTION, 1/4" X 12', STRAIGHT: Brand: MEDLINE

## (undated) DEVICE — INFECTION CONTROL KIT SYS

## (undated) DEVICE — TROCAR ENDOSCP L100MM DIA5MM BLDELSS STBL SL THRD OPT VW

## (undated) DEVICE — GENERAL LAPAROSCOPY - SMH: Brand: MEDLINE INDUSTRIES, INC.

## (undated) DEVICE — OBTRTR BLDELSS 8MM DISP -- DA VINCI - SNGL USE

## (undated) DEVICE — SEAL UNIV 5-8MM DISP BX/10 -- DA VINCI XI - SNGL USE

## (undated) DEVICE — SUTURE V-LOC SZ 0 L18IN NONABSORBABLE BLU L37MM GS-21 1/2 VLOCN0326

## (undated) DEVICE — 4-PORT MANIFOLD: Brand: NEPTUNE 2

## (undated) DEVICE — GAUZE SPONGES,12 PLY: Brand: CURITY

## (undated) DEVICE — DERMABOND SKIN ADH 0.7ML -- DERMABOND ADVANCED 12/BX

## (undated) DEVICE — COVER,TABLE,HEAVY DUTY,60"X90",STRL: Brand: MEDLINE

## (undated) DEVICE — LAMINECTOMY RICHMOND-LF: Brand: MEDLINE INDUSTRIES, INC.

## (undated) DEVICE — GLOVE SURG SZ 65 L12IN FNGR THK94MIL STD WHT LTX FREE

## (undated) DEVICE — SHEET TRNSF DISPOSABLE HOVERMATT 100 PER CA